# Patient Record
Sex: MALE | Race: NATIVE HAWAIIAN OR OTHER PACIFIC ISLANDER | HISPANIC OR LATINO | Employment: FULL TIME | URBAN - NONMETROPOLITAN AREA
[De-identification: names, ages, dates, MRNs, and addresses within clinical notes are randomized per-mention and may not be internally consistent; named-entity substitution may affect disease eponyms.]

---

## 2023-10-08 ENCOUNTER — HOSPITAL ENCOUNTER (EMERGENCY)
Facility: HOSPITAL | Age: 53
Discharge: HOME | End: 2023-10-08
Attending: EMERGENCY MEDICINE
Payer: COMMERCIAL

## 2023-10-08 ENCOUNTER — APPOINTMENT (EMERGENCY)
Dept: RADIOLOGY | Facility: HOSPITAL | Age: 53
End: 2023-10-08
Payer: COMMERCIAL

## 2023-10-08 VITALS
HEART RATE: 64 BPM | OXYGEN SATURATION: 98 % | DIASTOLIC BLOOD PRESSURE: 97 MMHG | TEMPERATURE: 97.7 F | SYSTOLIC BLOOD PRESSURE: 173 MMHG | RESPIRATION RATE: 18 BRPM

## 2023-10-08 DIAGNOSIS — M25.551 RIGHT HIP PAIN: Primary | ICD-10-CM

## 2023-10-08 PROCEDURE — 96372 THER/PROPH/DIAG INJ SC/IM: CPT

## 2023-10-08 PROCEDURE — 99283 EMERGENCY DEPT VISIT LOW MDM: CPT

## 2023-10-08 PROCEDURE — 73502 X-RAY EXAM HIP UNI 2-3 VIEWS: CPT

## 2023-10-08 PROCEDURE — 99284 EMERGENCY DEPT VISIT MOD MDM: CPT | Performed by: EMERGENCY MEDICINE

## 2023-10-08 RX ORDER — MORPHINE SULFATE 15 MG/1
7.5-15 TABLET ORAL EVERY 8 HOURS PRN
Qty: 4 TABLET | Refills: 0 | Status: SHIPPED | OUTPATIENT
Start: 2023-10-08

## 2023-10-08 RX ORDER — PREDNISONE 10 MG/1
50 TABLET ORAL DAILY
Qty: 25 TABLET | Refills: 0 | Status: SHIPPED | OUTPATIENT
Start: 2023-10-08 | End: 2023-10-13

## 2023-10-08 RX ORDER — KETOROLAC TROMETHAMINE 30 MG/ML
30 INJECTION, SOLUTION INTRAMUSCULAR; INTRAVENOUS ONCE
Status: COMPLETED | OUTPATIENT
Start: 2023-10-08 | End: 2023-10-08

## 2023-10-08 RX ORDER — MORPHINE SULFATE 15 MG/1
15 TABLET ORAL ONCE
Status: COMPLETED | OUTPATIENT
Start: 2023-10-08 | End: 2023-10-08

## 2023-10-08 RX ADMIN — MORPHINE SULFATE 15 MG: 15 TABLET ORAL at 16:21

## 2023-10-08 RX ADMIN — KETOROLAC TROMETHAMINE 30 MG: 30 INJECTION, SOLUTION INTRAMUSCULAR; INTRAVENOUS at 16:22

## 2023-10-08 NOTE — DISCHARGE INSTRUCTIONS
Possible bursitis, sacroiliitis  Return immediately if worse or any new symptoms  Tylenol 1000 mg every 6 hours as needed  and/or  Advil 400 mg every 6 hours as needed  May take both together

## 2023-10-08 NOTE — Clinical Note
Mare Leon was seen and treated in our emergency department on 10/8/2023. Diagnosis:     Pete Huddleston  may return to work on return date. He may return on this date: 10/16/2023         If you have any questions or concerns, please don't hesitate to call.       Ashtyn Denise, DO    ______________________________           _______________          _______________  Hospital Representative                              Date                                Time

## 2023-10-08 NOTE — ED PROVIDER NOTES
History  Chief Complaint   Patient presents with   • Hip Pain     Pt states right hip pain started last week. Denies trauma. States he is a  for work. 77-year-old male accompanied by spouse describes atraumatic right hip pain worse over the past week and a half, worse with movement, no analgesics used. No fever or chills. No history of pain or injury. No other complaints. History provided by:  Patient and spouse  Hip Pain  Location:  Right lateral hip area  Quality:  Ache, sharp  Severity:  Severe  Onset quality:  Gradual  Timing:  Constant  Progression:  Worsening  Chronicity:  New  Context:  Atraumatic  Relieved by:  Nothing tried  Worsened by: Movement, positioning  Ineffective treatments:  Not  Associated symptoms: no abdominal pain, no chest pain, no fever, no myalgias, no nausea, no rash, no shortness of breath and no vomiting        None       Past Medical History:   Diagnosis Date   • Arthritis    • Heart attack ([address])    • Hypertension    • Osteoporosis    • Prostate cancer [facility])    • Psychiatric disorder        Past Surgical History:   Procedure Laterality Date   • CARDIAC SURGERY     • PROSTATE SURGERY         History reviewed. No pertinent family history. I have reviewed and agree with the history as documented. E-Cigarette/Vaping   • E-Cigarette Use Never User      E-Cigarette/Vaping Substances     Social History     Tobacco Use   • Smoking status: Every Day     Types: Cigarettes   • Smokeless tobacco: Never   Vaping Use   • Vaping Use: Never used   Substance Use Topics   • Alcohol use: Yes   • Drug use: Never       Review of Systems   Constitutional: Negative for fever. Respiratory: Negative for shortness of breath. Cardiovascular: Negative for chest pain. Gastrointestinal: Negative for abdominal pain, nausea and vomiting. Musculoskeletal: Negative for myalgias. Skin: Negative for rash. All other systems reviewed and are negative.       Physical Exam  Physical Exam  Vitals and nursing note reviewed. Constitutional:       Comments: Pleasant, comfortable-appearing   HENT:      Head: Normocephalic and atraumatic. Mouth/Throat:      Mouth: Mucous membranes are moist.      Pharynx: Oropharynx is clear. Eyes:      Conjunctiva/sclera: Conjunctivae normal.      Pupils: Pupils are equal, round, and reactive to light. Cardiovascular:      Rate and Rhythm: Normal rate and regular rhythm. Heart sounds: Normal heart sounds. Pulmonary:      Effort: Pulmonary effort is normal.      Breath sounds: Normal breath sounds. Abdominal:      General: Bowel sounds are normal. There is no distension. Palpations: Abdomen is soft. Tenderness: There is no abdominal tenderness. Musculoskeletal:         General: No deformity. Cervical back: Neck supple. Comments: Right hip tender laterally greater trochanter, normal femoral arterial pulses, no tenderness, right posterior hip tenderness, sacroiliac area tenderness, no overlying skin changes or swelling, no midline spinal tenderness or deformity, intact range of motion at right hip with discomfort, neurovascularly intact right lower extremity with dorsal pedal pulses 2+, grossly intact sensation, intact great toe flexion and extension bilaterally, saddle area sensation intact   Skin:     General: Skin is warm and dry. Neurological:      General: No focal deficit present. Mental Status: He is alert and oriented to person, place, and time. Cranial Nerves: No cranial nerve deficit. Coordination: Coordination normal.   Psychiatric:         Behavior: Behavior normal.         Thought Content:  Thought content normal.         Judgment: Judgment normal.         Vital Signs  ED Triage Vitals   Temperature Pulse Respirations Blood Pressure SpO2   10/08/23 1425 10/08/23 1425 10/08/23 1425 10/08/23 1425 10/08/23 1425   97.7 °F (36.5 °C) 86 20 (!) 166/103 100 %      Temp Source Heart Rate Source Patient Position - Orthostatic VS BP Location FiO2 (%)   10/08/23 1425 -- -- -- --   Temporal          Pain Score       10/08/23 1426       10 - Worst Possible Pain           Vitals:    10/08/23 1425   BP: (!) 166/103   Pulse: 86         Visual Acuity      ED Medications  Medications   morphine (MSIR) IR tablet 15 mg (15 mg Oral Given 10/8/23 1621)   ketorolac (TORADOL) injection 30 mg (30 mg Intramuscular Given 10/8/23 1622)       Diagnostic Studies  Results Reviewed     None                 XR hip/pelv 2-3 vws right if performed   ED Interpretation by Tamika Rowan DO (10/08 1659)   No obvious fracture or destructive changes                 Procedures  Procedures         ED Course  ED Course as of 10/08/23 1702   [place] Oct 08, 2023   1701 We discussed x-rays, radiology follow-up, care going forward and voices good understanding as spouse recently going through similar episode, agrees with close outpatient follow-up and will return immediately if worse or any new symptoms                                             Medical Decision Making  Right hip pain: acute illness or injury  Amount and/or Complexity of Data Reviewed  Radiology: ordered and independent interpretation performed. Decision-making details documented in ED Course. ECG/medicine tests: ordered and independent interpretation performed. Decision-making details documented in ED Course. Risk  Prescription drug management. Disposition  Final diagnoses:   Right hip pain     Time reflects when diagnosis was documented in both MDM as applicable and the Disposition within this note     Time User Action Codes Description Comment    10/8/2023  4:59 PM Tamika Rowan Add [J64.442] Right hip pain       ED Disposition     ED Disposition   Discharge    Condition   Stable    Date/Time   Sun Oct 8, 2023  4:59 PM    Comment   Mari. discharge to home/self care.                Follow-up Information     Follow up With Specialties Details Why Contact appbackr II  Schedule an appointment as soon as possible for a visit in 1 week  [address] [facility]  [address]  [phone]            Patient's Medications   Discharge Prescriptions    MORPHINE (MSIR) 15 MG TABLET    Take 0.5-1 tablets (7.5-15 mg total) by mouth every 8 (eight) hours as needed for severe pain for up to 4 doses Max Daily Amount: 45 mg       Start Date: 10/8/2023 End Date: --       Order Dose: 7.5-15 mg       Quantity: 4 tablet    Refills: 0    PREDNISONE 10 MG TABLET    Take 5 tablets (50 mg total) by mouth daily for 5 days       Start Date: 10/8/2023 End Date: 10/13/2023       Order Dose: 50 mg       Quantity: 25 tablet    Refills: 0       No discharge procedures on file.     PDMP Review     None          ED Provider  Electronically Signed by           Anand Diaz DO  10/08/23 7184

## 2023-12-17 ENCOUNTER — HOSPITAL ENCOUNTER (EMERGENCY)
Facility: HOSPITAL | Age: 53
Discharge: HOME | End: 2023-12-17
Attending: EMERGENCY MEDICINE

## 2023-12-17 ENCOUNTER — APPOINTMENT (EMERGENCY)
Dept: RADIOLOGY | Facility: HOSPITAL | Age: 53
End: 2023-12-17

## 2023-12-17 VITALS
HEART RATE: 77 BPM | RESPIRATION RATE: 20 BRPM | WEIGHT: 197.53 LBS | BODY MASS INDEX: 31 KG/M2 | TEMPERATURE: 97.9 F | DIASTOLIC BLOOD PRESSURE: 91 MMHG | HEIGHT: 67 IN | SYSTOLIC BLOOD PRESSURE: 172 MMHG | OXYGEN SATURATION: 94 %

## 2023-12-17 DIAGNOSIS — E78.00 HIGH CHOLESTEROL: ICD-10-CM

## 2023-12-17 DIAGNOSIS — G62.9 NEUROPATHY: ICD-10-CM

## 2023-12-17 DIAGNOSIS — K21.9 GERD (GASTROESOPHAGEAL REFLUX DISEASE): ICD-10-CM

## 2023-12-17 DIAGNOSIS — F32.A DEPRESSION: ICD-10-CM

## 2023-12-17 DIAGNOSIS — I10 HYPERTENSION: Primary | ICD-10-CM

## 2023-12-17 LAB
ALBUMIN SERPL BCP-MCNC: 4.3 G/DL (ref 3.5–5)
ALP SERPL-CCNC: 86 U/L (ref 34–104)
ALT SERPL W P-5'-P-CCNC: 13 U/L (ref 7–52)
ANION GAP SERPL CALCULATED.3IONS-SCNC: 8 MMOL/L
AST SERPL W P-5'-P-CCNC: 14 U/L (ref 13–39)
BASOPHILS # BLD AUTO: 0.05 THOUSANDS/ÂΜL (ref 0–0.1)
BASOPHILS NFR BLD AUTO: 1 % (ref 0–1)
BILIRUB SERPL-MCNC: 0.37 MG/DL (ref 0.2–1)
BNP SERPL-MCNC: 22 PG/ML (ref 0–100)
BUN SERPL-MCNC: 13 MG/DL (ref 5–25)
CALCIUM SERPL-MCNC: 9.2 MG/DL (ref 8.4–10.2)
CARDIAC TROPONIN I PNL SERPL HS: 4 NG/L
CHLORIDE SERPL-SCNC: 108 MMOL/L (ref 96–108)
CO2 SERPL-SCNC: 23 MMOL/L (ref 21–32)
CREAT SERPL-MCNC: 0.84 MG/DL (ref 0.6–1.3)
EOSINOPHIL # BLD AUTO: 0.11 THOUSAND/ÂΜL (ref 0–0.61)
EOSINOPHIL NFR BLD AUTO: 2 % (ref 0–6)
ERYTHROCYTE [DISTWIDTH] IN BLOOD BY AUTOMATED COUNT: 13.1 % (ref 11.6–15.1)
FLUAV RNA RESP QL NAA+PROBE: NEGATIVE
FLUBV RNA RESP QL NAA+PROBE: NEGATIVE
GFR SERPL CREATININE-BSD FRML MDRD: 99 ML/MIN/1.73SQ M
GLUCOSE SERPL-MCNC: 113 MG/DL (ref 65–140)
HCT VFR BLD AUTO: 41.8 % (ref 36.5–49.3)
HGB BLD-MCNC: 13.9 G/DL (ref 12–17)
IMM GRANULOCYTES # BLD AUTO: 0.01 THOUSAND/UL (ref 0–0.2)
IMM GRANULOCYTES NFR BLD AUTO: 0 % (ref 0–2)
LYMPHOCYTES # BLD AUTO: 2.97 THOUSANDS/ÂΜL (ref 0.6–4.47)
LYMPHOCYTES NFR BLD AUTO: 60 % (ref 14–44)
MCH RBC QN AUTO: 29.8 PG (ref 26.8–34.3)
MCHC RBC AUTO-ENTMCNC: 33.3 G/DL (ref 31.4–37.4)
MCV RBC AUTO: 90 FL (ref 82–98)
MONOCYTES # BLD AUTO: 0.38 THOUSAND/ÂΜL (ref 0.17–1.22)
MONOCYTES NFR BLD AUTO: 8 % (ref 4–12)
NEUTROPHILS # BLD AUTO: 1.45 THOUSANDS/ÂΜL (ref 1.85–7.62)
NEUTS SEG NFR BLD AUTO: 29 % (ref 43–75)
NRBC BLD AUTO-RTO: 0 /100 WBCS
PLATELET # BLD AUTO: 205 THOUSANDS/UL (ref 149–390)
PMV BLD AUTO: 10.5 FL (ref 8.9–12.7)
POTASSIUM SERPL-SCNC: 3.2 MMOL/L (ref 3.5–5.3)
PROT SERPL-MCNC: 7.1 G/DL (ref 6.4–8.4)
RBC # BLD AUTO: 4.67 MILLION/UL (ref 3.88–5.62)
RSV RNA RESP QL NAA+PROBE: NEGATIVE
SARS-COV-2 RNA RESP QL NAA+PROBE: NEGATIVE
SODIUM SERPL-SCNC: 139 MMOL/L (ref 135–147)
WBC # BLD AUTO: 4.97 THOUSAND/UL (ref 4.31–10.16)

## 2023-12-17 PROCEDURE — 36415 COLL VENOUS BLD VENIPUNCTURE: CPT | Performed by: EMERGENCY MEDICINE

## 2023-12-17 PROCEDURE — 83880 ASSAY OF NATRIURETIC PEPTIDE: CPT | Performed by: EMERGENCY MEDICINE

## 2023-12-17 PROCEDURE — 80053 COMPREHEN METABOLIC PANEL: CPT | Performed by: EMERGENCY MEDICINE

## 2023-12-17 PROCEDURE — 99285 EMERGENCY DEPT VISIT HI MDM: CPT

## 2023-12-17 PROCEDURE — 99285 EMERGENCY DEPT VISIT HI MDM: CPT | Performed by: EMERGENCY MEDICINE

## 2023-12-17 PROCEDURE — 71045 X-RAY EXAM CHEST 1 VIEW: CPT

## 2023-12-17 PROCEDURE — 93005 ELECTROCARDIOGRAM TRACING: CPT

## 2023-12-17 PROCEDURE — 0241U HB NFCT DS VIR RESP RNA 4 TRGT: CPT | Performed by: EMERGENCY MEDICINE

## 2023-12-17 PROCEDURE — 85025 COMPLETE CBC W/AUTO DIFF WBC: CPT | Performed by: EMERGENCY MEDICINE

## 2023-12-17 PROCEDURE — 94640 AIRWAY INHALATION TREATMENT: CPT

## 2023-12-17 PROCEDURE — 84484 ASSAY OF TROPONIN QUANT: CPT | Performed by: EMERGENCY MEDICINE

## 2023-12-17 RX ORDER — METOPROLOL SUCCINATE 25 MG/1
25 TABLET, EXTENDED RELEASE ORAL DAILY
Qty: 30 TABLET | Refills: 2 | Status: SHIPPED | OUTPATIENT
Start: 2023-12-17 | End: 2024-03-16

## 2023-12-17 RX ORDER — ALBUTEROL SULFATE 90 UG/1
2 AEROSOL, METERED RESPIRATORY (INHALATION) ONCE
Status: COMPLETED | OUTPATIENT
Start: 2023-12-17 | End: 2023-12-17

## 2023-12-17 RX ORDER — DULOXETIN HYDROCHLORIDE 30 MG/1
30 CAPSULE, DELAYED RELEASE ORAL DAILY
Qty: 30 CAPSULE | Refills: 2 | Status: SHIPPED | OUTPATIENT
Start: 2023-12-17 | End: 2024-03-16

## 2023-12-17 RX ORDER — OMEPRAZOLE 20 MG/1
20 CAPSULE, DELAYED RELEASE ORAL DAILY
Qty: 30 CAPSULE | Refills: 2 | Status: SHIPPED | OUTPATIENT
Start: 2023-12-17

## 2023-12-17 RX ORDER — GABAPENTIN 300 MG/1
600 CAPSULE ORAL 3 TIMES DAILY
Qty: 180 CAPSULE | Refills: 2 | Status: SHIPPED | OUTPATIENT
Start: 2023-12-17 | End: 2024-03-16

## 2023-12-17 RX ORDER — POTASSIUM CHLORIDE 20 MEQ/1
40 TABLET, EXTENDED RELEASE ORAL ONCE
Status: COMPLETED | OUTPATIENT
Start: 2023-12-17 | End: 2023-12-17

## 2023-12-17 RX ORDER — ATORVASTATIN CALCIUM 80 MG/1
80 TABLET, FILM COATED ORAL DAILY
Qty: 30 TABLET | Refills: 2 | Status: SHIPPED | OUTPATIENT
Start: 2023-12-17 | End: 2024-03-16

## 2023-12-17 RX ORDER — IPRATROPIUM BROMIDE AND ALBUTEROL SULFATE 2.5; .5 MG/3ML; MG/3ML
3 SOLUTION RESPIRATORY (INHALATION) ONCE
Status: COMPLETED | OUTPATIENT
Start: 2023-12-17 | End: 2023-12-17

## 2023-12-17 RX ADMIN — ALBUTEROL SULFATE 2 PUFF: 90 AEROSOL, METERED RESPIRATORY (INHALATION) at 22:28

## 2023-12-17 RX ADMIN — IPRATROPIUM BROMIDE AND ALBUTEROL SULFATE 3 ML: 2.5; .5 SOLUTION RESPIRATORY (INHALATION) at 21:06

## 2023-12-17 RX ADMIN — POTASSIUM CHLORIDE 40 MEQ: 1500 TABLET, EXTENDED RELEASE ORAL at 22:26

## 2023-12-18 LAB
ATRIAL RATE: 78 BPM
P AXIS: 41 DEGREES
PR INTERVAL: 146 MS
QRS AXIS: -33 DEGREES
QRSD INTERVAL: 80 MS
QT INTERVAL: 378 MS
QTC INTERVAL: 430 MS
T WAVE AXIS: 43 DEGREES
VENTRICULAR RATE: 78 BPM

## 2023-12-18 NOTE — ED PROVIDER NOTES
History  Chief Complaint   Patient presents with    Shortness of Breath     Pt reports sob since Friday, also complains of headache for one week     Patient complains of 3 days of shortness of breath, cough, headache, backache, fatigue  Denies chest pain or abdominal pain  Denies fevers      History provided by:  Patient   used: No    Shortness of Breath  Severity:  Moderate  Onset quality:  Gradual  Duration:  3 days  Timing:  Constant  Progression:  Unchanged  Chronicity:  New  Relieved by:  Nothing  Worsened by:  Nothing  Ineffective treatments:  None tried  Associated symptoms: cough and headaches    Associated symptoms: no abdominal pain, no chest pain, no claudication, no ear pain, no fever, no hemoptysis, no neck pain, no rash, no sore throat, no syncope, no vomiting and no wheezing        Prior to Admission Medications   Prescriptions Last Dose Informant Patient Reported? Taking?   morphine (MSIR) 15 mg tablet   No No   Sig: Take 0.5-1 tablets (7.5-15 mg total) by mouth every 8 (eight) hours as needed for severe pain for up to 4 doses Max Daily Amount: 45 mg      Facility-Administered Medications: None       Past Medical History:   Diagnosis Date    Arthritis     Heart attack (HCC)     Hypertension     Osteoporosis     Prostate cancer (HCC)     Psychiatric disorder        Past Surgical History:   Procedure Laterality Date    CARDIAC SURGERY      PROSTATE SURGERY         History reviewed. No pertinent family history.  I have reviewed and agree with the history as documented.    E-Cigarette/Vaping    E-Cigarette Use Never User      E-Cigarette/Vaping Substances     Social History     Tobacco Use    Smoking status: Every Day     Current packs/day: 0.50     Types: Cigarettes    Smokeless tobacco: Never   Vaping Use    Vaping status: Never Used   Substance Use Topics    Alcohol use: Not Currently    Drug use: Never       Review of Systems   Constitutional:  Negative for chills and fever.    HENT:  Negative for ear pain, hearing loss, sore throat, trouble swallowing and voice change.    Eyes:  Negative for pain and discharge.   Respiratory:  Positive for cough and shortness of breath. Negative for hemoptysis and wheezing.    Cardiovascular:  Negative for chest pain, palpitations, claudication and syncope.   Gastrointestinal:  Negative for abdominal pain, blood in stool, constipation, diarrhea, nausea and vomiting.   Genitourinary:  Negative for dysuria, flank pain, frequency and hematuria.   Musculoskeletal:  Negative for joint swelling, neck pain and neck stiffness.   Skin:  Negative for rash and wound.   Neurological:  Positive for headaches. Negative for dizziness, seizures, syncope and facial asymmetry.   Psychiatric/Behavioral:  Negative for hallucinations, self-injury and suicidal ideas.    All other systems reviewed and are negative.      Physical Exam  Physical Exam  Vitals and nursing note reviewed.   Constitutional:       General: He is not in acute distress.     Appearance: He is well-developed.   HENT:      Head: Normocephalic and atraumatic.      Right Ear: External ear normal.      Left Ear: External ear normal.   Eyes:      General: No scleral icterus.        Right eye: No discharge.         Left eye: No discharge.      Extraocular Movements: Extraocular movements intact.      Conjunctiva/sclera: Conjunctivae normal.   Cardiovascular:      Rate and Rhythm: Normal rate and regular rhythm.      Heart sounds: Normal heart sounds. No murmur heard.  Pulmonary:      Effort: Pulmonary effort is normal.      Breath sounds: Normal breath sounds. No decreased breath sounds, wheezing, rhonchi or rales.   Abdominal:      General: Bowel sounds are normal. There is no distension.      Palpations: Abdomen is soft.      Tenderness: There is no abdominal tenderness. There is no guarding or rebound.   Musculoskeletal:         General: No deformity. Normal range of motion.      Cervical back: Normal range  of motion and neck supple.   Skin:     General: Skin is warm and dry.      Findings: No rash.   Neurological:      General: No focal deficit present.      Mental Status: He is alert and oriented to person, place, and time.      Cranial Nerves: No cranial nerve deficit.   Psychiatric:         Mood and Affect: Mood normal.         Behavior: Behavior normal.         Thought Content: Thought content normal.         Judgment: Judgment normal.         Vital Signs  ED Triage Vitals [12/17/23 2041]   Temperature Pulse Respirations Blood Pressure SpO2   97.9 °F (36.6 °C) 83 18 (!) 183/101 98 %      Temp Source Heart Rate Source Patient Position - Orthostatic VS BP Location FiO2 (%)   Temporal Monitor Sitting Right arm --      Pain Score       10 - Worst Possible Pain           Vitals:    12/17/23 2041 12/17/23 2045   BP: (!) 183/101 (!) 172/92   Pulse: 83 80   Patient Position - Orthostatic VS: Sitting          Visual Acuity      ED Medications  Medications   ipratropium-albuterol (DUO-NEB) 0.5-2.5 mg/3 mL inhalation solution 3 mL (3 mL Nebulization Given 12/17/23 2106)       Diagnostic Studies  Results Reviewed       Procedure Component Value Units Date/Time    CBC and differential [[id number]] Collected: 12/17/23 2116    Lab Status: In process Specimen: Blood from Arm, Left Updated: 12/17/23 2121    COVID19, Influenza A/B, RSV PCR, [facility] [[id number]] Collected: 12/17/23 2116    Lab Status: In process Specimen: Nares from Nose Updated: 12/17/23 2121    HS Troponin 0hr (reflex protocol) [[id number]] Collected: 12/17/23 2116    Lab Status: In process Specimen: Blood from Arm, Left Updated: 12/17/23 2120    Comprehensive metabolic panel [[id number]] Collected: 12/17/23 2116    Lab Status: In process Specimen: Blood from Arm, Left Updated: 12/17/23 2120    B-Type Natriuretic Peptide(BNP) [[id number]] Collected: 12/17/23 2116    Lab Status: In process Specimen: Blood from Arm, Left Updated: 12/17/23 2120                   XR chest  portable    (Results Pending)              Procedures  ECG 12 Lead Documentation Only    Date/Time: 12/17/2023 8:44 PM    Performed by: Hany Vazquez MD  Authorized by: Hany Vazquez MD    ECG reviewed by me, the ED Provider: yes    Patient location:  ED  Previous ECG:     Previous ECG:  Unavailable  Interpretation:     Interpretation: normal    Rate:     ECG rate:  78    ECG rate assessment: normal    Rhythm:     Rhythm: sinus rhythm    Ectopy:     Ectopy: none    QRS:     QRS axis:  Left    QRS intervals:  Normal  Conduction:     Conduction: normal    ST segments:     ST segments:  Normal  T waves:     T waves: normal    Other findings:     Other findings: LVH             ED Course  ED Course as of 12/17/23 2122   Sun Dec 17, 2023   2119 Signed out to Dr. Khan pending lab work, chest x-ray, COVID testing                               SBIRT 22yo+      Flowsheet Row Most Recent Value   Initial Alcohol Screen: US AUDIT-C     1. How often do you have a drink containing alcohol? 0 Filed at: 12/17/2023 2041   2. How many drinks containing alcohol do you have on a typical day you are drinking?  0 Filed at: 12/17/2023 2041   3a. Male UNDER 65: How often do you have five or more drinks on one occasion? 0 Filed at: 12/17/2023 2041   Audit-C Score 0 Filed at: 12/17/2023 2041   FRANCHESCA: How many times in the past year have you...    Used an illegal drug or used a prescription medication for non-medical reasons? Never Filed at: 12/17/2023 2041                      Medical Decision Making  Based on the history and medical screening exam performed the diagnostic considerations include but are not limited to COVID/flu/RSV/other viral respiratory illness, CHF, COPD.    Signed out to Dr. Khan pending lab work, chest x-ray, COVID testing      Amount and/or Complexity of Data Reviewed  Labs: ordered. Decision-making details documented in ED Course.     Details: Pending at time of signout  Radiology: ordered and  independent interpretation performed. Decision-making details documented in ED Course.     Details: Pending at time of signout  ECG/medicine tests: ordered and independent interpretation performed. Decision-making details documented in ED Course.     Details: Normal sinus rhythm rate 78 with left axis and LVH    Risk  Prescription drug management.             Disposition  Final diagnoses:   None     ED Disposition       None          Follow-up Information    None         Patient's Medications   Discharge Prescriptions    No medications on file       No discharge procedures on file.    PDMP Review       None            ED Provider  Electronically Signed by             Hany Vazquez MD  12/17/23 0451

## 2023-12-18 NOTE — ED CARE HANDOFF
Emergency Department Sign Out Note        Sign out and transfer of care from Dr. Vazquez. See Separate Emergency Department note.     The patient, Christopher Smalls Jr., was evaluated by the previous provider for shortness of breath.    Workup Completed:  Lab and imaging    ED Course / Workup Pending (followup):  Results labs and imaging, reevaluation and disposition            Patient resting comfortably with stable vital signs, lab and imaging findings unremarkable and discussed at bedside, patient and wife inquired as to whether or not symptoms could be related to the fact that he ran out of his medications a few weeks ago, he has yet to establish medical care in this region as he recently moved here and will not have insurance until January 1, I did provide patient with prescriptions for multiple medications that he is chronically on that he ran out of 2 to 3 weeks ago, advise close follow-up with PCP which he states he will establish once he obtains his insurance on January 1, advised to return if any additional concerns                         Procedures  Medical Decision Making  Amount and/or Complexity of Data Reviewed  Labs: ordered.  Radiology: ordered and independent interpretation performed.    Risk  Prescription drug management.            Disposition  Final diagnoses:   Hypertension   High cholesterol   Depression   GERD (gastroesophageal reflux disease)   Neuropathy     Time reflects when diagnosis was documented in both MDM as applicable and the Disposition within this note       Time User Action Codes Description Comment    12/17/2023 10:20 PM Denise Khan Add [I10] Hypertension     12/17/2023 10:20 PM Denise Khan Add [E78.00] High cholesterol     12/17/2023 10:20 PM Denise Khan Add [F32.A] Depression     12/17/2023 10:20 PM Denise Khan Add [K21.9] GERD (gastroesophageal reflux disease)     12/17/2023 10:21 PM Denise Khan Add [G62.9] Neuropathy           ED Disposition       ED  Disposition   Discharge    Condition   Stable    Date/Time   Sun Dec 17, 2023 10:22 PM    Comment   Christopher Smalls Jr. discharge to home/self care.                   Follow-up Information       Follow up With Specialties Details Why Contact Info    Edwina Davies DO Family Medicine In 1 week  [address] [facility] [address] [phone]            Discharge Medication List as of 12/17/2023 10:22 PM        START taking these medications    Details   atorvastatin (LIPITOR) 80 mg tablet Take 1 tablet (80 mg total) by mouth daily, Starting Sun 12/17/2023, Until Sat 3/16/2024, Normal      DULoxetine (Cymbalta) 30 mg delayed release capsule Take 1 capsule (30 mg total) by mouth daily, Starting Sun 12/17/2023, Until Sat 3/16/2024, Normal      gabapentin (Neurontin) 300 mg capsule Take 2 capsules (600 mg total) by mouth 3 (three) times a day For post-herpetic neuralgia: Take 1 tablet on day 1,  Then take 2 tablets on day 2, Then take 3 tablets on day 3 and every day after that as instructed by your doctor., Starting Sun 12/17/2023 , Until Sat 3/16/2024, Normal      metoprolol succinate (TOPROL-XL) 25 mg 24 hr tablet Take 1 tablet (25 mg total) by mouth daily, Starting Sun 12/17/2023, Until Sat 3/16/2024, Normal      omeprazole (PriLOSEC) 20 mg delayed release capsule Take 1 capsule (20 mg total) by mouth daily, Starting Sun 12/17/2023, Normal           CONTINUE these medications which have NOT CHANGED    Details   morphine (MSIR) 15 mg tablet Take 0.5-1 tablets (7.5-15 mg total) by mouth every 8 (eight) hours as needed for severe pain for up to 4 doses Max Daily Amount: 45 mg, Starting Sun 10/8/2023, Normal           No discharge procedures on file.       ED Provider  Electronically Signed by     Denise Khan DO  12/18/23 0049

## 2024-01-11 ENCOUNTER — HOSPITAL ENCOUNTER (EMERGENCY)
Facility: HOSPITAL | Age: 54
Discharge: HOME | End: 2024-01-11
Attending: EMERGENCY MEDICINE
Payer: COMMERCIAL

## 2024-01-11 ENCOUNTER — APPOINTMENT (EMERGENCY)
Dept: CT IMAGING | Facility: HOSPITAL | Age: 54
End: 2024-01-11
Payer: COMMERCIAL

## 2024-01-11 VITALS
WEIGHT: 190 LBS | TEMPERATURE: 98.5 F | RESPIRATION RATE: 16 BRPM | BODY MASS INDEX: 29.76 KG/M2 | HEART RATE: 89 BPM | DIASTOLIC BLOOD PRESSURE: 83 MMHG | SYSTOLIC BLOOD PRESSURE: 151 MMHG | OXYGEN SATURATION: 97 %

## 2024-01-11 DIAGNOSIS — G43.909 MIGRAINE: Primary | ICD-10-CM

## 2024-01-11 DIAGNOSIS — I10 HYPERTENSION: ICD-10-CM

## 2024-01-11 LAB
ALBUMIN SERPL BCP-MCNC: 4.5 G/DL (ref 3.5–5)
ALP SERPL-CCNC: 88 U/L (ref 34–104)
ALT SERPL W P-5'-P-CCNC: 16 U/L (ref 7–52)
ANION GAP SERPL CALCULATED.3IONS-SCNC: 10 MMOL/L
AST SERPL W P-5'-P-CCNC: 29 U/L (ref 13–39)
BASOPHILS # BLD AUTO: 0.06 THOUSANDS/ÂΜL (ref 0–0.1)
BASOPHILS NFR BLD AUTO: 1 % (ref 0–1)
BILIRUB SERPL-MCNC: 0.59 MG/DL (ref 0.2–1)
BUN SERPL-MCNC: 6 MG/DL (ref 5–25)
CALCIUM SERPL-MCNC: 9.4 MG/DL (ref 8.4–10.2)
CHLORIDE SERPL-SCNC: 103 MMOL/L (ref 96–108)
CO2 SERPL-SCNC: 23 MMOL/L (ref 21–32)
CREAT SERPL-MCNC: 0.9 MG/DL (ref 0.6–1.3)
EOSINOPHIL # BLD AUTO: 0.05 THOUSAND/ÂΜL (ref 0–0.61)
EOSINOPHIL NFR BLD AUTO: 1 % (ref 0–6)
ERYTHROCYTE [DISTWIDTH] IN BLOOD BY AUTOMATED COUNT: 12.6 % (ref 11.6–15.1)
ERYTHROCYTE [SEDIMENTATION RATE] IN BLOOD: 25 MM/HOUR (ref 0–19)
GFR SERPL CREATININE-BSD FRML MDRD: 97 ML/MIN/1.73SQ M
GLUCOSE SERPL-MCNC: 111 MG/DL (ref 65–140)
HCT VFR BLD AUTO: 42.6 % (ref 36.5–49.3)
HGB BLD-MCNC: 14.3 G/DL (ref 12–17)
IMM GRANULOCYTES # BLD AUTO: 0.01 THOUSAND/UL (ref 0–0.2)
IMM GRANULOCYTES NFR BLD AUTO: 0 % (ref 0–2)
LYMPHOCYTES # BLD AUTO: 2.6 THOUSANDS/ÂΜL (ref 0.6–4.47)
LYMPHOCYTES NFR BLD AUTO: 53 % (ref 14–44)
MCH RBC QN AUTO: 29.5 PG (ref 26.8–34.3)
MCHC RBC AUTO-ENTMCNC: 33.6 G/DL (ref 31.4–37.4)
MCV RBC AUTO: 88 FL (ref 82–98)
MONOCYTES # BLD AUTO: 0.31 THOUSAND/ÂΜL (ref 0.17–1.22)
MONOCYTES NFR BLD AUTO: 6 % (ref 4–12)
NEUTROPHILS # BLD AUTO: 1.95 THOUSANDS/ÂΜL (ref 1.85–7.62)
NEUTS SEG NFR BLD AUTO: 39 % (ref 43–75)
NRBC BLD AUTO-RTO: 0 /100 WBCS
PLATELET # BLD AUTO: 257 THOUSANDS/UL (ref 149–390)
PMV BLD AUTO: 11.3 FL (ref 8.9–12.7)
POTASSIUM SERPL-SCNC: 4.6 MMOL/L (ref 3.5–5.3)
PROT SERPL-MCNC: 7.8 G/DL (ref 6.4–8.4)
RBC # BLD AUTO: 4.85 MILLION/UL (ref 3.88–5.62)
SODIUM SERPL-SCNC: 136 MMOL/L (ref 135–147)
WBC # BLD AUTO: 4.98 THOUSAND/UL (ref 4.31–10.16)

## 2024-01-11 PROCEDURE — 85025 COMPLETE CBC W/AUTO DIFF WBC: CPT | Performed by: EMERGENCY MEDICINE

## 2024-01-11 PROCEDURE — 99284 EMERGENCY DEPT VISIT MOD MDM: CPT | Performed by: EMERGENCY MEDICINE

## 2024-01-11 PROCEDURE — 80053 COMPREHEN METABOLIC PANEL: CPT | Performed by: EMERGENCY MEDICINE

## 2024-01-11 PROCEDURE — G1004 CDSM NDSC: HCPCS

## 2024-01-11 PROCEDURE — 36415 COLL VENOUS BLD VENIPUNCTURE: CPT | Performed by: EMERGENCY MEDICINE

## 2024-01-11 PROCEDURE — 85652 RBC SED RATE AUTOMATED: CPT | Performed by: EMERGENCY MEDICINE

## 2024-01-11 PROCEDURE — 70450 CT HEAD/BRAIN W/O DYE: CPT

## 2024-01-11 RX ORDER — KETOROLAC TROMETHAMINE 30 MG/ML
30 INJECTION, SOLUTION INTRAMUSCULAR; INTRAVENOUS ONCE
Status: COMPLETED | OUTPATIENT
Start: 2024-01-11 | End: 2024-01-11

## 2024-01-11 RX ORDER — SUMATRIPTAN 6 MG/.5ML
6 INJECTION, SOLUTION SUBCUTANEOUS ONCE
Status: COMPLETED | OUTPATIENT
Start: 2024-01-11 | End: 2024-01-11

## 2024-01-11 RX ORDER — SUMATRIPTAN 50 MG/1
50 TABLET, FILM COATED ORAL ONCE AS NEEDED
Qty: 4 TABLET | Refills: 0 | Status: SHIPPED | OUTPATIENT
Start: 2024-01-11 | End: 2024-01-17 | Stop reason: SDUPTHER

## 2024-01-11 RX ORDER — IBUPROFEN 800 MG/1
800 TABLET ORAL 3 TIMES DAILY
Qty: 21 TABLET | Refills: 0 | Status: SHIPPED | OUTPATIENT
Start: 2024-01-11

## 2024-01-11 RX ORDER — DIPHENHYDRAMINE HYDROCHLORIDE 50 MG/ML
25 INJECTION INTRAMUSCULAR; INTRAVENOUS ONCE
Status: COMPLETED | OUTPATIENT
Start: 2024-01-11 | End: 2024-01-11

## 2024-01-11 RX ORDER — METOPROLOL TARTRATE 1 MG/ML
5 INJECTION, SOLUTION INTRAVENOUS ONCE
Status: COMPLETED | OUTPATIENT
Start: 2024-01-11 | End: 2024-01-11

## 2024-01-11 RX ORDER — DEXAMETHASONE SODIUM PHOSPHATE 10 MG/ML
8 INJECTION, SOLUTION INTRAMUSCULAR; INTRAVENOUS ONCE
Status: COMPLETED | OUTPATIENT
Start: 2024-01-11 | End: 2024-01-11

## 2024-01-11 RX ORDER — METOCLOPRAMIDE HYDROCHLORIDE 5 MG/ML
10 INJECTION INTRAMUSCULAR; INTRAVENOUS ONCE
Status: COMPLETED | OUTPATIENT
Start: 2024-01-11 | End: 2024-01-11

## 2024-01-11 RX ADMIN — DEXAMETHASONE SODIUM PHOSPHATE 8 MG: 10 INJECTION, SOLUTION INTRAMUSCULAR; INTRAVENOUS at 19:55

## 2024-01-11 RX ADMIN — SUMATRIPTAN 6 MG: 6 INJECTION, SOLUTION SUBCUTANEOUS at 19:55

## 2024-01-11 RX ADMIN — METOCLOPRAMIDE HYDROCHLORIDE 10 MG: 5 INJECTION INTRAMUSCULAR; INTRAVENOUS at 19:55

## 2024-01-11 RX ADMIN — METOROPROLOL TARTRATE 5 MG: 5 INJECTION, SOLUTION INTRAVENOUS at 21:32

## 2024-01-11 RX ADMIN — KETOROLAC TROMETHAMINE 30 MG: 30 INJECTION, SOLUTION INTRAMUSCULAR; INTRAVENOUS at 21:31

## 2024-01-11 RX ADMIN — SODIUM CHLORIDE 1000 ML: 0.9 INJECTION, SOLUTION INTRAVENOUS at 19:53

## 2024-01-11 RX ADMIN — DIPHENHYDRAMINE HYDROCHLORIDE 25 MG: 50 INJECTION, SOLUTION INTRAMUSCULAR; INTRAVENOUS at 19:55

## 2024-01-11 NOTE — Clinical Note
Christopher Smalls was seen and treated in our emergency department on 1/11/2024.                Diagnosis:     Christopher  may return to work on return date.    He may return on this date: 01/13/2024         If you have any questions or concerns, please don't hesitate to call.      Ute Nunez, DO    ______________________________           _______________          _______________  Hospital Representative                              Date                                Time

## 2024-01-12 RX ORDER — EZETIMIBE 10 MG/1
10 TABLET ORAL
COMMUNITY
Start: 2023-07-05 | End: 2024-01-15 | Stop reason: SDUPTHER

## 2024-01-12 RX ORDER — ASPIRIN 81 MG/1
81 TABLET, CHEWABLE ORAL
COMMUNITY
Start: 2023-04-18 | End: 2024-01-15

## 2024-01-12 NOTE — ED PROVIDER NOTES
History  Chief Complaint   Patient presents with    Headache     Patient reports headache for the past week and pain in right shoulder.      53 yr old male presents for evaluation  of headache for several days. Patient states that  he has had a history of migraines but this is the worse headache of his life. He admits to photophobia and some slight blurry vision. Patient is also complaining of right shoulder pain which is chronic and always present from a previous rotator cuff tear. He denies re-injury.        Prior to Admission Medications   Prescriptions Last Dose Informant Patient Reported? Taking?   DULoxetine (Cymbalta) 30 mg delayed release capsule   No No   Sig: Take 1 capsule (30 mg total) by mouth daily   atorvastatin (LIPITOR) 80 mg tablet   No No   Sig: Take 1 tablet (80 mg total) by mouth daily   gabapentin (Neurontin) 300 mg capsule   No No   Sig: Take 2 capsules (600 mg total) by mouth 3 (three) times a day For post-herpetic neuralgia: Take 1 tablet on day 1,  Then take 2 tablets on day 2, Then take 3 tablets on day 3 and every day after that as instructed by your doctor.   metoprolol succinate (TOPROL-XL) 25 mg 24 hr tablet   No No   Sig: Take 1 tablet (25 mg total) by mouth daily   morphine (MSIR) 15 mg tablet   No No   Sig: Take 0.5-1 tablets (7.5-15 mg total) by mouth every 8 (eight) hours as needed for severe pain for up to 4 doses Max Daily Amount: 45 mg   omeprazole (PriLOSEC) 20 mg delayed release capsule   No No   Sig: Take 1 capsule (20 mg total) by mouth daily      Facility-Administered Medications: None       Past Medical History:   Diagnosis Date    Arthritis     Heart attack (HCC)     Hypertension     Osteoporosis     Prostate cancer (HCC)     Psychiatric disorder        Past Surgical History:   Procedure Laterality Date    CARDIAC SURGERY      PROSTATE SURGERY         History reviewed. No pertinent family history.  I have reviewed and agree with the history as  documented.    E-Cigarette/Vaping    E-Cigarette Use Never User      E-Cigarette/Vaping Substances     Social History     Tobacco Use    Smoking status: Former     Types: Cigarettes    Smokeless tobacco: Never   Vaping Use    Vaping status: Never Used   Substance Use Topics    Alcohol use: Not Currently    Drug use: Never       Review of Systems   Constitutional:  Negative for chills and fever.   HENT:  Negative for congestion, ear pain and sore throat.    Eyes:  Positive for photophobia. Negative for pain and visual disturbance.   Respiratory:  Negative for cough and shortness of breath.    Cardiovascular:  Negative for chest pain and palpitations.   Gastrointestinal:  Negative for abdominal pain and vomiting.   Genitourinary:  Negative for dysuria and hematuria.   Musculoskeletal:  Positive for arthralgias, joint swelling and myalgias. Negative for back pain.   Skin:  Negative for color change and rash.   Neurological:  Negative for seizures and syncope.   All other systems reviewed and are negative.      Physical Exam  Physical Exam  Vitals and nursing note reviewed.   Constitutional:       General: He is in acute distress.      Appearance: Normal appearance. He is well-developed and normal weight. He is not ill-appearing, toxic-appearing or diaphoretic.   HENT:      Head: Normocephalic and atraumatic.      Right Ear: External ear normal.      Left Ear: External ear normal.      Nose: Nose normal. No congestion or rhinorrhea.      Mouth/Throat:      Mouth: Mucous membranes are dry.      Pharynx: No oropharyngeal exudate.   Eyes:      Conjunctiva/sclera: Conjunctivae normal.   Cardiovascular:      Rate and Rhythm: Normal rate and regular rhythm.      Heart sounds: No murmur heard.  Pulmonary:      Effort: Pulmonary effort is normal. No respiratory distress.      Breath sounds: Normal breath sounds. No stridor. No wheezing or rhonchi.   Abdominal:      General: Abdomen is flat. Bowel sounds are normal. There is no  distension.      Palpations: Abdomen is soft. There is no mass.      Tenderness: There is no abdominal tenderness. There is no guarding or rebound.      Hernia: No hernia is present.   Musculoskeletal:         General: Tenderness and signs of injury present. No swelling or deformity.      Cervical back: Normal range of motion and neck supple. No rigidity.      Right lower leg: No edema.      Left lower leg: No edema.      Comments: Decreased rom of the right shoulder, sensation preserved over the anterior and posterior aspect of the shoulder.    Skin:     General: Skin is warm and dry.      Capillary Refill: Capillary refill takes less than 2 seconds.      Coloration: Skin is not jaundiced or pale.      Findings: No bruising, erythema, lesion or rash.   Neurological:      General: No focal deficit present.      Mental Status: He is alert. Mental status is at baseline.      Cranial Nerves: No cranial nerve deficit.      Sensory: No sensory deficit.      Motor: No weakness.      Coordination: Coordination normal.      Gait: Gait normal.      Deep Tendon Reflexes: Reflexes normal.   Psychiatric:         Mood and Affect: Mood normal.         Vital Signs  ED Triage Vitals [01/11/24 1757]   Temperature Pulse Respirations Blood Pressure SpO2   98.5 °F (36.9 °C) 88 16 (!) 162/103 96 %      Temp Source Heart Rate Source Patient Position - Orthostatic VS BP Location FiO2 (%)   Temporal Monitor Sitting Right arm --      Pain Score       10 - Worst Possible Pain           Vitals:    01/11/24 1757 01/11/24 2145   BP: (!) 162/103 151/83   Pulse: 88 89   Patient Position - Orthostatic VS: Sitting Lying         Visual Acuity      ED Medications  Medications   metoclopramide (REGLAN) injection 10 mg (10 mg Intravenous Given 1/11/24 1955)   diphenhydrAMINE (BENADRYL) injection 25 mg (25 mg Intravenous Given 1/11/24 1955)   sodium chloride 0.9 % bolus 1,000 mL (0 mL Intravenous Stopped 1/11/24 2053)   dexamethasone (PF) (DECADRON)  injection 8 mg (8 mg Intravenous Given 1/11/24 1955)   SUMAtriptan (IMITREX) subcutaneous injection 6 mg (6 mg Subcutaneous Given 1/11/24 1955)   ketorolac (TORADOL) injection 30 mg (30 mg Intravenous Given 1/11/24 2131)   metoprolol (LOPRESSOR) injection 5 mg (5 mg Intravenous Given 1/11/24 2132)       Diagnostic Studies  Results Reviewed       Procedure Component Value Units Date/Time    Comprehensive metabolic panel [[id number]] Collected: 01/11/24 2002    Lab Status: Final result Specimen: Blood from Arm, Right Updated: 01/11/24 2028     Sodium 136 mmol/L      Potassium 4.6 mmol/L      Chloride 103 mmol/L      CO2 23 mmol/L      ANION GAP 10 mmol/L      BUN 6 mg/dL      Creatinine 0.90 mg/dL      Glucose 111 mg/dL      Calcium 9.4 mg/dL      AST 29 U/L      ALT 16 U/L      Alkaline Phosphatase 88 U/L      Total Protein 7.8 g/dL      Albumin 4.5 g/dL      Total Bilirubin 0.59 mg/dL      eGFR 97 ml/min/1.73sq m     Narrative:      National Kidney [employer] Foundation guidelines for Chronic Kidney Disease (CKD):     Stage 1 with normal or high GFR (GFR > 90 mL/min/1.73 square meters)    Stage 2 Mild CKD (GFR = 60-89 mL/min/1.73 square meters)    Stage 3A Moderate CKD (GFR = 45-59 mL/min/1.73 square meters)    Stage 3B Moderate CKD (GFR = 30-44 mL/min/1.73 square meters)    Stage 4 Severe CKD (GFR = 15-29 mL/min/1.73 square meters)    Stage 5 End Stage CKD (GFR <15 mL/min/1.73 square meters)  Note: GFR calculation is accurate only with a steady state creatinine    Sedimentation rate, automated [[id number]]  (Abnormal) Collected: 01/11/24 2002    Lab Status: Final result Specimen: Blood from Arm, Right Updated: 01/11/24 2028     Sed Rate 25 mm/hour     CBC and differential [[id number]]  (Abnormal) Collected: 01/11/24 2002    Lab Status: Final result Specimen: Blood from Arm, Right Updated: 01/11/24 2007     WBC 4.98 Thousand/uL      RBC 4.85 Million/uL      Hemoglobin 14.3 g/dL      Hematocrit 42.6 %      MCV 88 fL       MCH 29.5 pg      MCHC 33.6 g/dL      RDW 12.6 %      MPV 11.3 fL      Platelets 257 Thousands/uL      nRBC 0 /100 WBCs      Neutrophils Relative 39 %      Immat GRANS % 0 %      Lymphocytes Relative 53 %      Monocytes Relative 6 %      Eosinophils Relative 1 %      Basophils Relative 1 %      Neutrophils Absolute 1.95 Thousands/µL      Immature Grans Absolute 0.01 Thousand/uL      Lymphocytes Absolute 2.60 Thousands/µL      Monocytes Absolute 0.31 Thousand/µL      Eosinophils Absolute 0.05 Thousand/µL      Basophils Absolute 0.06 Thousands/µL                    CT head wo contrast   Final Result by Cristóbal Uriostegui MD (01/11 2049)      No acute intracranial abnormality.                  Workstation performed: [id number]                    Procedures  Procedures         ED Course                               SBIRT 20yo+      Flowsheet Row Most Recent Value   Initial Alcohol Screen: US AUDIT-C     1. How often do you have a drink containing alcohol? 0 Filed at: 01/11/2024 1800   2. How many drinks containing alcohol do you have on a typical day you are drinking?  0 Filed at: 01/11/2024 1800   3a. Male UNDER 65: How often do you have five or more drinks on one occasion? 0 Filed at: 01/11/2024 1800   Audit-C Score 0 Filed at: 01/11/2024 1800   FRANCHESCA: How many times in the past year have you...    Used an illegal drug or used a prescription medication for non-medical reasons? Never Filed at: 01/11/2024 1800                      Medical Decision Making  Amount and/or Complexity of Data Reviewed  Labs: ordered.  Radiology: ordered.    Risk  Prescription drug management.             Disposition  Final diagnoses:   Migraine   Hypertension     Time reflects when diagnosis was documented in both MDM as applicable and the Disposition within this note       Time User Action Codes Description Comment    1/11/2024  9:25 PM Ute Nunez Add [G43.909] Migraine     1/11/2024  9:29 PM Ute Nunez Add [I10] Hypertension            ED Disposition       ED Disposition   Discharge    Condition   Stable    Date/Time   Thu Jan 11, 2024 2125    Comment   Christopher Smalls Jr. discharge to home/self care.                   Follow-up Information       Follow up With Specialties Details Why Contact Luz Maria Alanis II  In 1 week  [address] [facility]  [place] [address] [phone]              Discharge Medication List as of 1/11/2024  9:30 PM        START taking these medications    Details   ibuprofen (MOTRIN) 800 mg tablet Take 1 tablet (800 mg total) by mouth 3 (three) times a day, Starting Thu 1/11/2024, Normal      SUMAtriptan (Imitrex) 50 mg tablet Take 1 tablet (50 mg total) by mouth once as needed for migraine for up to 4 doses, Starting u 1/11/2024, Normal           CONTINUE these medications which have NOT CHANGED    Details   atorvastatin (LIPITOR) 80 mg tablet Take 1 tablet (80 mg total) by mouth daily, Starting Sun 12/17/2023, Until Sat 3/16/2024, Normal      DULoxetine (Cymbalta) 30 mg delayed release capsule Take 1 capsule (30 mg total) by mouth daily, Starting Sun 12/17/2023, Until Sat 3/16/2024, Normal      gabapentin (Neurontin) 300 mg capsule Take 2 capsules (600 mg total) by mouth 3 (three) times a day For post-herpetic neuralgia: Take 1 tablet on day 1,  Then take 2 tablets on day 2, Then take 3 tablets on day 3 and every day after that as instructed by your doctor., Starting Sun 12/17/2023 , Until Sat 3/16/2024, Normal      metoprolol succinate (TOPROL-XL) 25 mg 24 hr tablet Take 1 tablet (25 mg total) by mouth daily, Starting Sun 12/17/2023, Until Sat 3/16/2024, Normal      morphine (MSIR) 15 mg tablet Take 0.5-1 tablets (7.5-15 mg total) by mouth every 8 (eight) hours as needed for severe pain for up to 4 doses Max Daily Amount: 45 mg, Starting Sun 10/8/2023, Normal      omeprazole (PriLOSEC) 20 mg delayed release capsule Take 1 capsule (20 mg total) by mouth daily, Starting Sun 12/17/2023, Normal             No  discharge procedures on file.    PDMP Review       None            ED Provider  Electronically Signed by             Ute Nunez DO  01/11/24 1495

## 2024-01-15 ENCOUNTER — TELEPHONE (OUTPATIENT)
Age: 54
End: 2024-01-15

## 2024-01-15 ENCOUNTER — OFFICE VISIT (OUTPATIENT)
Dept: FAMILY MEDICINE CLINIC | Facility: CLINIC | Age: 54
End: 2024-01-15
Payer: COMMERCIAL

## 2024-01-15 VITALS
WEIGHT: 207.67 LBS | TEMPERATURE: 97.8 F | BODY MASS INDEX: 32.6 KG/M2 | DIASTOLIC BLOOD PRESSURE: 90 MMHG | SYSTOLIC BLOOD PRESSURE: 157 MMHG | OXYGEN SATURATION: 98 % | HEIGHT: 67 IN

## 2024-01-15 DIAGNOSIS — M54.16 LUMBAR RADICULOPATHY: ICD-10-CM

## 2024-01-15 DIAGNOSIS — K21.9 GASTROESOPHAGEAL REFLUX DISEASE, UNSPECIFIED WHETHER ESOPHAGITIS PRESENT: Primary | ICD-10-CM

## 2024-01-15 DIAGNOSIS — G89.29 CHRONIC PAIN OF BOTH SHOULDERS: ICD-10-CM

## 2024-01-15 DIAGNOSIS — M25.512 CHRONIC PAIN OF BOTH SHOULDERS: ICD-10-CM

## 2024-01-15 DIAGNOSIS — Z87.891 HISTORY OF TOBACCO USE DISORDER: ICD-10-CM

## 2024-01-15 DIAGNOSIS — R73.03 PREDIABETES: ICD-10-CM

## 2024-01-15 DIAGNOSIS — G45.9 TIA (TRANSIENT ISCHEMIC ATTACK): ICD-10-CM

## 2024-01-15 DIAGNOSIS — R51.9 CHRONIC INTRACTABLE HEADACHE, UNSPECIFIED HEADACHE TYPE: ICD-10-CM

## 2024-01-15 DIAGNOSIS — F32.A DEPRESSION: ICD-10-CM

## 2024-01-15 DIAGNOSIS — G89.29 CHRONIC INTRACTABLE HEADACHE, UNSPECIFIED HEADACHE TYPE: ICD-10-CM

## 2024-01-15 DIAGNOSIS — E78.2 MIXED HYPERLIPIDEMIA: ICD-10-CM

## 2024-01-15 DIAGNOSIS — I10 ESSENTIAL HYPERTENSION: ICD-10-CM

## 2024-01-15 DIAGNOSIS — Z12.11 SCREENING FOR COLON CANCER: ICD-10-CM

## 2024-01-15 DIAGNOSIS — C61 PROSTATE CA (HCC): ICD-10-CM

## 2024-01-15 DIAGNOSIS — Z11.59 NEED FOR HEPATITIS C SCREENING TEST: ICD-10-CM

## 2024-01-15 DIAGNOSIS — Z11.4 SCREENING FOR HIV (HUMAN IMMUNODEFICIENCY VIRUS): ICD-10-CM

## 2024-01-15 DIAGNOSIS — M25.511 CHRONIC PAIN OF BOTH SHOULDERS: ICD-10-CM

## 2024-01-15 DIAGNOSIS — I25.118 CORONARY ARTERY DISEASE OF NATIVE ARTERY OF NATIVE HEART WITH STABLE ANGINA PECTORIS (HCC): ICD-10-CM

## 2024-01-15 PROBLEM — I35.8 AORTIC VALVE MASS: Status: ACTIVE | Noted: 2024-01-15

## 2024-01-15 PROBLEM — Z85.46 HISTORY OF PROSTATE CANCER: Status: ACTIVE | Noted: 2024-01-15

## 2024-01-15 PROBLEM — R90.89 ABNORMAL BRAIN MRI: Status: ACTIVE | Noted: 2024-01-15

## 2024-01-15 PROBLEM — I35.8 AORTIC VALVE SCLEROSIS: Status: ACTIVE | Noted: 2024-01-15

## 2024-01-15 PROBLEM — I35.1 AORTIC REGURGITATION: Status: ACTIVE | Noted: 2024-01-15

## 2024-01-15 PROBLEM — I34.0 MITRAL REGURGITATION: Status: ACTIVE | Noted: 2024-01-15

## 2024-01-15 PROBLEM — Z72.0 TOBACCO USE: Status: ACTIVE | Noted: 2019-10-03

## 2024-01-15 PROBLEM — I25.10 CORONARY ARTERY DISEASE INVOLVING NATIVE CORONARY ARTERY OF NATIVE HEART: Status: ACTIVE | Noted: 2019-10-05

## 2024-01-15 PROBLEM — E78.5 HYPERLIPEMIA: Status: ACTIVE | Noted: 2024-01-15

## 2024-01-15 PROCEDURE — 99204 OFFICE O/P NEW MOD 45 MIN: CPT | Performed by: FAMILY MEDICINE

## 2024-01-15 RX ORDER — AMLODIPINE BESYLATE 5 MG/1
5 TABLET ORAL DAILY
Qty: 90 TABLET | Refills: 0 | Status: SHIPPED | OUTPATIENT
Start: 2024-01-15

## 2024-01-15 RX ORDER — ATORVASTATIN CALCIUM 80 MG/1
80 TABLET, FILM COATED ORAL DAILY
Qty: 90 TABLET | Refills: 0 | Status: SHIPPED | OUTPATIENT
Start: 2024-01-15 | End: 2024-04-14

## 2024-01-15 RX ORDER — EZETIMIBE 10 MG/1
10 TABLET ORAL DAILY
Qty: 90 TABLET | Refills: 0 | Status: SHIPPED | OUTPATIENT
Start: 2024-01-15

## 2024-01-15 RX ORDER — METOPROLOL SUCCINATE 25 MG/1
25 TABLET, EXTENDED RELEASE ORAL DAILY
Qty: 90 TABLET | Refills: 0 | Status: SHIPPED | OUTPATIENT
Start: 2024-01-15 | End: 2024-04-14

## 2024-01-15 RX ORDER — DULOXETIN HYDROCHLORIDE 60 MG/1
60 CAPSULE, DELAYED RELEASE ORAL DAILY
Qty: 90 CAPSULE | Refills: 0 | Status: SHIPPED | OUTPATIENT
Start: 2024-01-15

## 2024-01-15 NOTE — ASSESSMENT & PLAN NOTE
Chronic, not well controlled  Current Blood Pressure: 157/90  Continue Metoprolol, restart patient on amlodipine 5mg daily  F/u 3 mo

## 2024-01-15 NOTE — TELEPHONE ENCOUNTER
PA for  ezetimibe (Zetia) 10 mg tablet  Approved   Date(s) approved 1/15/24-1/14/25  Case #[id number]    Patient advised by [x] elarmt Message                      [] Phone call       Pharmacy advised by [x]Fax                                     []Phone call    Approval letter scanned into Media No -Did not receive yet

## 2024-01-15 NOTE — ASSESSMENT & PLAN NOTE
S/p resection  Previously followed with urology in [place] and needs to re-est in this area. Referral placed.

## 2024-01-15 NOTE — ASSESSMENT & PLAN NOTE
Continue prn sumatriptan, recommend prn ibuprofen, and to discontinue the scheduled ibuprofen from the recent ED visit.   Suspect uncontrolled HTN is contributing - will manage this  Refer to neurology as patient needs to re-establish.

## 2024-01-15 NOTE — ASSESSMENT & PLAN NOTE
Patient reporting history of rotator cuff damage, unspecified. He was following with orthopedics when he lived in [place], and needs to reestablish.  Referral provided today.

## 2024-01-15 NOTE — ASSESSMENT & PLAN NOTE
Chronic with low back pain. Used to follow with spine and pain mgmt in [place] and needs to re-est here as the injections he was getting were helpful.

## 2024-01-15 NOTE — PROGRESS NOTES
UROLOGY PROGRESS NOTE         NAME: Christopher Smalls Jr.  AGE: 53 y.o. SEX: male  : 1970   MRN: [id number]    DATE: 1/15/2024  TIME: 1:41 PM    Assessment and Plan   Procedures     Impression:   1. History of prostate cancer         Plan: Patient agrees to follow-up with her [facility] urology with both Dr. Meza his primary surgeon and Dr. Peyton Day.  I will not charge the patient for this office visit today since I was unable to address his issues, that will now be taken back with his primary urology team at [place].    The patient will not be charged for an office visit.  Chief Complaint   No chief complaint on file.    History of Present Illness     HPI: Christopher Smalls Jr. is a 53 y.o. year old male who presents with per PCP note patient with a history of prostate cancer status post surgery follow-up with [facility] urology now request to have his care at [facility] urology.  Will try to obtain old records.  His PSA on 2024 was less than 0.01.    Patient states he was not seen at [facility] but at [facility] Dr. Lyndon Fish's operation 2 years ago.  Did not require ADT radiation.  Unfortunately for the patient he has had persistent stress incontinence and has a artificial urinary sphincter that says monthly he has to have fluid drained from it.  I stated the patient I am not familiar with this type of artificial sphincter and recommended him going back to [place].    He also has erectile dysfunction and is considering a penile implant and I told him I also do not do those and he will follow-up with [facility].        The following portions of the patient's history were reviewed and updated as appropriate: allergies, current medications, past family history, past medical history, past social history, past surgical history and problem list.  Past Medical History:   Diagnosis Date    Arthritis     Heart attack (HCC)     Hypertension     Osteoporosis     Prostate cancer (HCC)      Psychiatric disorder      Past Surgical History:   Procedure Laterality Date    CARDIAC SURGERY      PROSTATE SURGERY       shoulder  Review of Systems     Const: Denies chills, fever and weight loss.  CV: Denies chest pain.  Resp: Denies SOB.  GI: Denies abdominal pain, nausea and vomiting.  : Denies symptoms other than stated above.  Musculo: Denies back pain.    Objective   There were no vitals taken for this visit.    Physical Exam  Const: Appears healthy and well developed. No signs of acute distress present.  Resp: Respirations are regular and unlabored.   CV: Rate is regular. Rhythm is regular.  Abdomen: Abdomen is soft, nontender, and nondistended. Kidneys are not palpable.  :   Psych: Patient's attitude is cooperative. Mood is normal. Affect is normal.    Current Medications     Current Outpatient Medications:     amLODIPine (NORVASC) 5 mg tablet, Take 1 tablet (5 mg total) by mouth daily, Disp: 90 tablet, Rfl: 0    atorvastatin (LIPITOR) 80 mg tablet, Take 1 tablet (80 mg total) by mouth daily, Disp: 90 tablet, Rfl: 0    DULoxetine (Cymbalta) 60 mg delayed release capsule, Take 1 capsule (60 mg total) by mouth daily, Disp: 90 capsule, Rfl: 0    ezetimibe (Zetia) 10 mg tablet, Take 1 tablet (10 mg total) by mouth daily, Disp: 90 tablet, Rfl: 0    gabapentin (Neurontin) 300 mg capsule, Take 2 capsules (600 mg total) by mouth 3 (three) times a day For post-herpetic neuralgia: Take 1 tablet on day 1,  Then take 2 tablets on day 2, Then take 3 tablets on day 3 and every day after that as instructed by your doctor., Disp: 180 capsule, Rfl: 2    ibuprofen (MOTRIN) 800 mg tablet, Take 1 tablet (800 mg total) by mouth 3 (three) times a day, Disp: 21 tablet, Rfl: 0    metoprolol succinate (TOPROL-XL) 25 mg 24 hr tablet, Take 1 tablet (25 mg total) by mouth daily, Disp: 90 tablet, Rfl: 0    omeprazole (PriLOSEC) 20 mg delayed release capsule, Take 1 capsule (20 mg total) by mouth daily, Disp: 30 capsule, Rfl: 2     SUMAtriptan (Imitrex) 50 mg tablet, Take 1 tablet (50 mg total) by mouth once as needed for migraine for up to 4 doses, Disp: 4 tablet, Rfl: 0        Paul Lyn MD

## 2024-01-15 NOTE — TELEPHONE ENCOUNTER
Patient called in and stated that the pharmacy didn't have a script for  ezetimibe (Zetia) 10 mg tablet .     I reviewed the a profile and saw the   ezetimibe (Zetia) 10 mg tablet  needed a prior auth from the insurance co. Prior auth was approved.      Please call Christopher at  when the prescription is called in to the pharmacy on file.     Thank you.

## 2024-01-15 NOTE — ASSESSMENT & PLAN NOTE
No recent labs, patient has not seen provider in some time since moving to this area.  He discontinued Zetia due to cost issues in the past, but now has new insurance.  Will continue on atorvastatin 80 mg daily and restart Zetia 10 mg daily.  Check labs and follow-up in 3 months

## 2024-01-15 NOTE — ASSESSMENT & PLAN NOTE
Patient needs to re-est with neurology in this area, referral provided. Continue mgmt of HTN and HLD as above. Follow up 3 mo

## 2024-01-15 NOTE — ASSESSMENT & PLAN NOTE
Echo 20202: EF 55-60%  Used to follow with cardiology at [facility]  Needs to reestablish in this area, referral provided  Continue atorvastatin, metoprolol  Patient self discontinued aspirin  With elevated blood pressures at home and in office today, will restart patient on amlodipine 5 mg daily  Patient self discontinued Zetia due to cost, but now has new insurance, will refill, and patient to reach out if cost continues to be a concern.  F/u 3mo

## 2024-01-15 NOTE — TELEPHONE ENCOUNTER
PA for ezetimibe (Zetia) 10 mg tablet     Submitted via  []CMM-KEY   [x][employer]-Case ID # [id number]   []Faxed to plan   []Other website   []Phone call Case ID #     Office notes sent, clinical questions answered. Awaiting determination

## 2024-01-15 NOTE — PROGRESS NOTES
Name: Christopher Smalls Jr.      : 1970      MRN: [id number]  Encounter Provider: Edwina Davies DO  Encounter Date: 1/15/2024   Encounter department: [facility]    Assessment & Plan     1. Gastroesophageal reflux disease, unspecified whether esophagitis present  Assessment & Plan:  Chronic, well controlled on Prilosec, continue omeprazole 20 mg daily  Continue to monitor      2. Coronary artery disease of native artery of native heart with stable angina pectoris (HCC)  Assessment & Plan:  Echo : EF 55-60%  Used to follow with cardiology at [facility]  Needs to reestablish in this area, referral provided  Continue atorvastatin, metoprolol  Patient self discontinued aspirin  With elevated blood pressures at home and in office today, will restart patient on amlodipine 5 mg daily  Patient self discontinued Zetia due to cost, but now has new insurance, will refill, and patient to reach out if cost continues to be a concern.  F/u 3mo      Orders:  -     CBC and differential; Future  -     Comprehensive metabolic panel; Future  -     Hemoglobin A1C; Future  -     Ambulatory Referral to Cardiology; Future  -     atorvastatin (LIPITOR) 80 mg tablet; Take 1 tablet (80 mg total) by mouth daily  -     metoprolol succinate (TOPROL-XL) 25 mg 24 hr tablet; Take 1 tablet (25 mg total) by mouth daily    3. Essential hypertension  Assessment & Plan:  Chronic, not well controlled  Current Blood Pressure: 157/90  Continue Metoprolol, restart patient on amlodipine 5mg daily  F/u 3 mo     Orders:  -     CBC and differential; Future  -     Comprehensive metabolic panel; Future  -     Ambulatory Referral to Cardiology; Future  -     metoprolol succinate (TOPROL-XL) 25 mg 24 hr tablet; Take 1 tablet (25 mg total) by mouth daily  -     amLODIPine (NORVASC) 5 mg tablet; Take 1 tablet (5 mg total) by mouth daily    4. Lumbar radiculopathy  Assessment & Plan:  Chronic with low back  pain. Used to follow with spine and pain mgmt in [place] and needs to re-est here as the injections he was getting were helpful.     Orders:  -     Ambulatory Referral to Podiatry; Future  -     Ambulatory referral to Spine & Pain Management; Future    5. Mixed hyperlipidemia  Assessment & Plan:  No recent labs, patient has not seen provider in some time since moving to this area.  He discontinued Zetia due to cost issues in the past, but now has new insurance.  Will continue on atorvastatin 80 mg daily and restart Zetia 10 mg daily.  Check labs and follow-up in 3 months    Orders:  -     CBC and differential; Future  -     Comprehensive metabolic panel; Future  -     Lipid panel; Future  -     ezetimibe (Zetia) 10 mg tablet; Take 1 tablet (10 mg total) by mouth daily  -     Ambulatory Referral to Cardiology; Future  -     atorvastatin (LIPITOR) 80 mg tablet; Take 1 tablet (80 mg total) by mouth daily    6. History of tobacco use disorder  Assessment & Plan:  Quit smoking December 2023  Prior heavy smoker, 3 packs a day 40 years  Continue to encourage smoking cessation!   Continue to monitor  Patient will need lung cancer screening.       7. Prediabetes  -     Comprehensive metabolic panel; Future  -     Hemoglobin A1C; Future    8. Depression  Assessment & Plan:  Stable, continue Cymbalta 60mg daily    Orders:  -     DULoxetine (Cymbalta) 60 mg delayed release capsule; Take 1 capsule (60 mg total) by mouth daily    9. Chronic intractable headache, unspecified headache type  Assessment & Plan:  Continue prn sumatriptan, recommend prn ibuprofen, and to discontinue the scheduled ibuprofen from the recent ED visit.   Suspect uncontrolled HTN is contributing - will manage this  Refer to neurology as patient needs to re-establish.     Orders:  -     Ambulatory Referral to Neurology; Future    10. TIA (transient ischemic attack)  Assessment & Plan:  Patient needs to re-est with neurology in this area, referral  provided. Continue mgmt of HTN and HLD as above. Follow up 3 mo    Orders:  -     Ambulatory Referral to Neurology; Future    11. Prostate CA (HCC)  Assessment & Plan:  S/p resection  Previously followed with urology in [place] and needs to re-est in this area. Referral placed.     Orders:  -     Ambulatory Referral to Urology; Future    12. Chronic pain of both shoulders  Assessment & Plan:  Patient reporting history of rotator cuff damage, unspecified. He was following with orthopedics when he lived in [place], and needs to reestablish.  Referral provided today.    Orders:  -     Ambulatory Referral to Sports Medicine; Future    13. Need for hepatitis C screening test  -     Hepatitis C Antibody; Future    14. Screening for HIV (human immunodeficiency virus)  -     HIV 1/2 AG/AB w Reflex [facility] for 2 yr old and above; Future    15. Screening for colon cancer  -     Ambulatory Referral to Gastroenterology; Future        Return in about 3 months (around 4/15/2024) for Annual physical.      Subjective      HPI  CC: establish care and review chronic health conditions  He also reports he was just recently in the hospital for headaches and pressure.   Used to be a patient with [facility]. Used to follow with multiple specialists when he lived in [place] and needs to re-establish in this area. .     PMH: Reflux, CAD status post stent placement x 2, hypertension, hyperlipidemia, chronic low back pain with lumbar radiculopathy, history of tobacco use disorder, prediabetes, depression, chronic headaches, TIA, prostate cancer status post prostatectomy, chronic shoulder pain and arthralgias   SurgHx: cardiac stent placement x2, partial bowel resection, prostate removal   Allergies: none  Medications: discontinued zetia due to cost.   FamHx: father - passed away (HTN, DM, heart diease, HLD, prostate cancer), Mother - Breast Cancer, Sister - passed away CHF complications   SocialHx:   Tobacco: quit 12/20/2023,  prior to that smoked for 40 years about 3ppd    Alcohol: none, quit drinking October 2023, Used to drink 6 beers per day    Relationship: , with step children    Career: [employer],      Specialists following: Urology for prostate cancer s/p implant, Neurologist for headaches, Orthopedics for shoulder rotator cuff issues, Pain mgmt for his lumbar spine, Cardiologist , podiatry     Review of Systems   Constitutional:  Negative for appetite change, chills and fever.   Eyes:  Negative for visual disturbance.   Respiratory:  Negative for shortness of breath.    Cardiovascular:  Negative for chest pain and leg swelling.   Musculoskeletal:  Positive for arthralgias.   Neurological:  Positive for headaches. Negative for dizziness and light-headedness.       Current Outpatient Medications on File Prior to Visit   Medication Sig    gabapentin (Neurontin) 300 mg capsule Take 2 capsules (600 mg total) by mouth 3 (three) times a day For post-herpetic neuralgia: Take 1 tablet on day 1,  Then take 2 tablets on day 2, Then take 3 tablets on day 3 and every day after that as instructed by your doctor.    ibuprofen (MOTRIN) 800 mg tablet Take 1 tablet (800 mg total) by mouth 3 (three) times a day    omeprazole (PriLOSEC) 20 mg delayed release capsule Take 1 capsule (20 mg total) by mouth daily    SUMAtriptan (Imitrex) 50 mg tablet Take 1 tablet (50 mg total) by mouth once as needed for migraine for up to 4 doses    [DISCONTINUED] atorvastatin (LIPITOR) 80 mg tablet Take 1 tablet (80 mg total) by mouth daily    [DISCONTINUED] DULoxetine (Cymbalta) 30 mg delayed release capsule Take 1 capsule (30 mg total) by mouth daily    [DISCONTINUED] metoprolol succinate (TOPROL-XL) 25 mg 24 hr tablet Take 1 tablet (25 mg total) by mouth daily    [DISCONTINUED] aspirin 81 mg chewable tablet 81 mg (Patient not taking: Reported on 1/15/2024)    [DISCONTINUED] ezetimibe (Zetia) 10 mg tablet 10 mg (Patient not taking: Reported on  "1/15/2024)    [DISCONTINUED] morphine (MSIR) 15 mg tablet Take 0.5-1 tablets (7.5-15 mg total) by mouth every 8 (eight) hours as needed for severe pain for up to 4 doses Max Daily Amount: 45 mg (Patient not taking: Reported on 1/15/2024)       Objective     /90 (BP Location: Right arm, Patient Position: Sitting, Cuff Size: Standard)   Temp 97.8 °F (36.6 °C) (Tympanic)   Ht 5' 7\" (1.702 m)   Wt 94.2 kg (207 lb 10.8 oz)   SpO2 98%   BMI 32.53 kg/m²     Physical Exam  Vitals reviewed.   Constitutional:       General: He is not in acute distress.     Appearance: He is normal weight. He is not ill-appearing.   HENT:      Head: Normocephalic and atraumatic.   Eyes:      Extraocular Movements: Extraocular movements intact.      Conjunctiva/sclera: Conjunctivae normal.   Neck:      Vascular: No carotid bruit.   Cardiovascular:      Rate and Rhythm: Normal rate and regular rhythm.      Pulses: Normal pulses.      Heart sounds: Murmur heard.   Pulmonary:      Effort: Pulmonary effort is normal.      Breath sounds: Normal breath sounds.   Musculoskeletal:      Right lower leg: No edema.      Left lower leg: No edema.   Neurological:      General: No focal deficit present.      Mental Status: He is alert.   Psychiatric:         Mood and Affect: Mood normal.         Behavior: Behavior normal.       Edwina Davies DO    "

## 2024-01-15 NOTE — ASSESSMENT & PLAN NOTE
Quit smoking December 2023  Prior heavy smoker, 3 packs a day 40 years  Continue to encourage smoking cessation!   Continue to monitor  Patient will need lung cancer screening.

## 2024-01-16 ENCOUNTER — TELEPHONE (OUTPATIENT)
Dept: UROLOGY | Facility: CLINIC | Age: 54
End: 2024-01-16

## 2024-01-16 DIAGNOSIS — R35.1 NOCTURIA: Primary | ICD-10-CM

## 2024-01-16 NOTE — TELEPHONE ENCOUNTER
Per PCP note pt used to see urology in [place]. Pt states he saw urologist in [place]. No recent PSA testing done. Pt will go to [facility] to have done. Call placed to [facility] to have last office note faxed, & fax sent have office note faxed.

## 2024-01-16 NOTE — TELEPHONE ENCOUNTER
----- Message from Paul Lyn MD sent at 1/15/2024  1:40 PM EST -----  Seeing patient next week.  Apparently with history of prostate cancer had a prostatectomy with [facility] urology now wishes his care to be done here can you try to get me the last note on however saw this jim back at [facility], need his path reports as well thanks

## 2024-01-17 DIAGNOSIS — G43.909 MIGRAINE: ICD-10-CM

## 2024-01-17 RX ORDER — SUMATRIPTAN 50 MG/1
50 TABLET, FILM COATED ORAL DAILY PRN
Qty: 30 TABLET | Refills: 0 | Status: SHIPPED | OUTPATIENT
Start: 2024-01-17

## 2024-01-17 NOTE — TELEPHONE ENCOUNTER
Medication: Sumatriptan (Imitrex)    Dose/Frequency: 50mg 1 tablet PRN     Quantity: 4 tabs     Pharmacy: [facility] [place]    Office:   [] PCP/Provider -   [x] Speciality/Provider - Emergency room    Does the patient have enough for 3 days?   [] Yes   [x] No - Send as HP to POD

## 2024-01-22 ENCOUNTER — APPOINTMENT (OUTPATIENT)
Dept: LAB | Facility: HOSPITAL | Age: 54
End: 2024-01-22
Payer: COMMERCIAL

## 2024-01-22 DIAGNOSIS — R35.1 NOCTURIA: ICD-10-CM

## 2024-01-22 LAB — PSA SERPL-MCNC: <0.01 NG/ML (ref 0–4)

## 2024-01-22 PROCEDURE — 36415 COLL VENOUS BLD VENIPUNCTURE: CPT

## 2024-01-22 PROCEDURE — 84153 ASSAY OF PSA TOTAL: CPT

## 2024-01-24 ENCOUNTER — OFFICE VISIT (OUTPATIENT)
Dept: UROLOGY | Facility: CLINIC | Age: 54
End: 2024-01-24

## 2024-01-24 VITALS
HEIGHT: 67 IN | BODY MASS INDEX: 32.52 KG/M2 | OXYGEN SATURATION: 96 % | HEART RATE: 79 BPM | WEIGHT: 207.2 LBS | DIASTOLIC BLOOD PRESSURE: 82 MMHG | TEMPERATURE: 96 F | SYSTOLIC BLOOD PRESSURE: 130 MMHG | RESPIRATION RATE: 20 BRPM

## 2024-01-24 DIAGNOSIS — Z85.46 HISTORY OF PROSTATE CANCER: Primary | ICD-10-CM

## 2024-01-24 DIAGNOSIS — C61 PROSTATE CA (HCC): ICD-10-CM

## 2024-01-24 PROCEDURE — NC001 PR NO CHARGE: Performed by: UROLOGY

## 2024-01-30 ENCOUNTER — HOSPITAL ENCOUNTER (OUTPATIENT)
Dept: RADIOLOGY | Facility: CLINIC | Age: 54
Discharge: HOME | End: 2024-01-30
Payer: COMMERCIAL

## 2024-01-30 ENCOUNTER — OFFICE VISIT (OUTPATIENT)
Dept: OBGYN CLINIC | Facility: CLINIC | Age: 54
End: 2024-01-30
Payer: COMMERCIAL

## 2024-01-30 VITALS
HEIGHT: 67 IN | DIASTOLIC BLOOD PRESSURE: 84 MMHG | BODY MASS INDEX: 32.33 KG/M2 | WEIGHT: 206 LBS | SYSTOLIC BLOOD PRESSURE: 140 MMHG | HEART RATE: 70 BPM | TEMPERATURE: 97.6 F

## 2024-01-30 DIAGNOSIS — M67.911 ROTATOR CUFF DISORDER, RIGHT: ICD-10-CM

## 2024-01-30 DIAGNOSIS — M25.511 CHRONIC RIGHT SHOULDER PAIN: ICD-10-CM

## 2024-01-30 DIAGNOSIS — M19.011 PRIMARY OSTEOARTHRITIS OF RIGHT SHOULDER: Primary | ICD-10-CM

## 2024-01-30 DIAGNOSIS — G89.29 CHRONIC RIGHT SHOULDER PAIN: ICD-10-CM

## 2024-01-30 DIAGNOSIS — M19.011 PRIMARY OSTEOARTHRITIS OF RIGHT SHOULDER: ICD-10-CM

## 2024-01-30 PROCEDURE — 73030 X-RAY EXAM OF SHOULDER: CPT

## 2024-01-30 PROCEDURE — 99243 OFF/OP CNSLTJ NEW/EST LOW 30: CPT | Performed by: STUDENT IN AN ORGANIZED HEALTH CARE EDUCATION/TRAINING PROGRAM

## 2024-01-30 NOTE — LETTER
"January 30, 2024     Edwina Davies, DO  [address] [facility] [address]    Patient: Christopher Smalls Jr.   YOB: 1970   Date of Visit: 1/30/2024       Dear Dr. Davies:    Thank you for referring Christopher Smalls to me for evaluation. Below are my notes for this consultation.    If you have questions, please do not hesitate to call me. I look forward to following your patient along with you.         Sincerely,        Michael Giordano MD        CC: No Recipients    Michael Giordano MD  1/30/2024  8:48 AM  Signed  1. Primary osteoarthritis of right shoulder  XR shoulder 2+ vw right      2. Chronic right shoulder pain  Ambulatory Referral to Sports Medicine    XR shoulder 2+ vw right      3. Rotator cuff disorder, right          Orders Placed This Encounter   Procedures   • XR shoulder 2+ vw right        Imaging Studies (I personally reviewed images in PACS and report):    X-ray right shoulder 1/30/2024: No acute osseous abnormalities.  Mild AC joint arthritis.  Alignment unremarkable.  X-ray right shoulder 10/8/2019: [facility] [place]: There is only report available noting \"There is no evidence of acute fracture or subluxation within the right shoulder.  Mild degenerative changes right shoulder joint with small humeral head and glenoid osteophytes.  Mild degenerative changes acromioclavicular joint with small osteophytes   present.  Normal bone density. \"    IMPRESSION:  Chronic atraumatic right shoulder pain, limited range of motion and strength  Reportedly has been seen by [facility] and had imaging done in the past noting rotator cuff tear and was supposed to undergo surgical intervention but due to financial constraints he was not able to pursue in the past.  Reportedly has had no relief from conservative treatments including shoulder cortisone injections, formal physical therapy in the past  At this point, patient mainly interested in surgical intervention    Other factors:  CAD, " "Aortic regurgitation, TIA  GERD    PLAN:    Clinical exam and radiographic imaging reviewed with patient today, with impression as per above. I have discussed with the patient the pathophysiology of this diagnosis and reviewed how the examination correlates with this diagnosis.    Radiographic imaging of his right shoulder obtained today as noted above.  I counseled patient that I did not have access to the MRI reports of his right shoulder more information on the prior orthopedics he had seen in [place], [place].    I counseled that if he has not improved reportedly to conservative treatments thus far, he could be indicated for surgical intervention.  I we will place a referral to Dr. Blue from orthopedic surgery to discuss surgical intervention.  I counseled patient in the interim to obtain a CD in regards to the MRI of his right shoulder for the surgeon to assess.  I will have my office reach out to the orthopedics in [place], [place] to add to his record today.    I advised against any shoulder cortisone injection today as this may delay surgical intervention and patient is agreeable as well.   In regards to pain control I counseled as needed use of acetaminophen, heat/ice therapy 20 minutes on/off.  I offered formal physical therapy referral today but patient deferred preferring to do home exercises.  Advised against oral NSAIDs or discussed with his PCP/cardiologist given his extensive cardiac disease history.    Return for Follow up with Dr. Blue from orthopedics.    Portions of the record may have been created with voice recognition software. Occasional wrong word or \"sound a like\" substitutions may have occurred due to the inherent limitations of voice recognition software. Read the chart carefully and recognize, using context, where substitutions have occurred.     CHIEF COMPLAINT:  Right shoulder pain    HPI:  Christopher Smalls  is a 53 y.o. male  who presents in regards to referral from Dr. Davies for " "      Visit 1/30/2024:  Initial evaluation of bilateral shoulder pain, however right shoulder pain is much more severe and prominently addressed today during visit  Of note, patient recently moved here from [place] and is attempting to reestablish care.  He states he has a history of \"rotator cuff damage.\"  Reports he has been seen by orthopedics in [place] and did have an MRI of his shoulder in the past.  I do not have access to orthopedic reports or prior MRI imaging of his right shoulder.  The last imaging noted in his chart is an x-ray from 2019.    Patient reports has been an ongoing issue for his right shoulder for the past year possibly more.  Denies any precipitating injury.  Reports pain has been progressively getting worse over time.  He also reports progressive loss of range of motion, strength, function of his right upper extremity.  He states pain is over the anterior aspect of the shoulder and can radiate to the posterior aspect of his shoulder.  Describes it as \"drilling into the bone\" sharp/throbbing.  He states it can be aggravated randomly when leaning on his right arm and with any range of motion movements, lifting/pushing/pulling.  Reports clicking of his right shoulder with range of motion.  Denies any N/T of right upper extremity.  He reports he has been unable to reach above shoulder height.  He states that that he underwent cortisone injections of his shoulder, physical therapy in the past but did not provide any relief.  He states he was supposed to get right shoulder surgery but had to defer due to financial constraints.  He feels that he is prepared to undergo surgery going forward and would like to know more details.      Medical, Surgical, Family, and Social History    Past Medical History:   Diagnosis Date   • Abnormal brain MRI    • Aortic regurgitation    • Aortic valve mass    • Arthritis    • Arthritis    • Back pain    • Bowel obstruction (HCC)    • CAD in native artery    • " "Coronary arteriosclerosis    • Diabetes (HCC)    • ED (erectile dysfunction)    • Elevated PSA    • GE reflux    • Hand tingling    • Headache    • Heart attack (HCC)    • Heart murmur    • High cholesterol    • Hypertension    • Hypertension    • Left leg pain    • Low back pain    • Lumbar radiculopathy    • Mitral regurgitation    • Mood disorder (HCC)    • Myocardial infarct (HCC)    • Osteoarthritis of wrist    • Osteoporosis    • Prediabetes    • Prostate cancer (HCC)    • Psychiatric disorder    • Right shoulder pain    • Sciatica    • Spinal osteoarthritis    • Sprain rotator cuff    • Sprain, lumbar    • Stress incontinence    • TIA (transient ischemic attack)    • Trigger ring finger of right hand    • Urinary leakage    • Weight disorder      Past Surgical History:   Procedure Laterality Date   • APPENDECTOMY     • CARDIAC CATHETERIZATION     • CARDIAC SURGERY     • CARDIOVASCULAR STRESS TEST     • CHOLECYSTECTOMY     • HERNIA REPAIR     • OTHER SURGICAL HISTORY      Stent placement   • PROSTATE SURGERY      Radical prostatectomy     Social History  Social History     Substance and Sexual Activity   Alcohol Use Not Currently     Social History     Substance and Sexual Activity   Drug Use Never     Social History     Tobacco Use   Smoking Status Former   • Types: Cigarettes   Smokeless Tobacco Never     Family History   Problem Relation Age of Onset   • Breast cancer Mother    • Hypertension Mother    • Heart disease Father    • Hypertension Father    • Prostate cancer Father    • Rheum arthritis Father    • Stroke Father    • Heart disease Sister    • Hypertension Sister    • Other Sister         Resp. disease     No Known Allergies       Physical Exam  /84   Pulse 70   Temp 97.6 °F (36.4 °C) (Temporal)   Ht 5' 7\" (1.702 m)   Wt 93.4 kg (206 lb)   BMI 32.26 kg/m²     Constitutional:  see vital signs  Gen: well-developed, normocephalic/atraumatic, well-groomed  Eyes: No inflammation or discharge " of conjunctiva or lids; sclera clear   Pharynx: no inflammation, lesion, or mass of lips  Neck: supple, no masses, non-distended  MSK: no inflammation, lesion, mass, or clubbing of nails and digits except for other than mentioned below  SKIN: no visible rashes or skin lesions  Pulmonary/Chest: Effort normal. No respiratory distress.     Ortho Exam  Shoulder exam:       RIGHT    Inspection Erythema None     Swelling None     Increased Warmth None    Rotator cuff ER 4/5     IR 5/5     Abduction 4/5    ROM      Abduction 100     ER0 40     IRb L5    TTP:  +anterior aspect/glenohumeral joint line, lateral aspect over greater tuberosity    Special Tests: O'Briens  (FF 90, add 10, resist thumbs up-, resist thumbs down+) Negative slap    Cross body Adduction Negative     Speeds  Positive     Yergason's Negative     Drop arm Negative     Neer Positive     Campos Positive        UE NV Exam: +2 Radial pulses   Sensation intact to light touch C5-T1 bilaterally    Right elbow, wrist, and and forearm ROM full, painless with passive ROM, no ttp or crepitance throughout extremities below shoulder joint          Procedures

## 2024-01-30 NOTE — PROGRESS NOTES
"1. Primary osteoarthritis of right shoulder  XR shoulder 2+ vw right      2. Chronic right shoulder pain  Ambulatory Referral to Sports Medicine    XR shoulder 2+ vw right      3. Rotator cuff disorder, right          Orders Placed This Encounter   Procedures    XR shoulder 2+ vw right        Imaging Studies (I personally reviewed images in PACS and report):    X-ray right shoulder 1/30/2024: No acute osseous abnormalities.  Mild AC joint arthritis.  Alignment unremarkable.  X-ray right shoulder 10/8/2019: [facility] [place]: There is only report available noting \"There is no evidence of acute fracture or subluxation within the right shoulder.  Mild degenerative changes right shoulder joint with small humeral head and glenoid osteophytes.  Mild degenerative changes acromioclavicular joint with small osteophytes   present.  Normal bone density. \"    IMPRESSION:  Chronic atraumatic right shoulder pain, limited range of motion and strength  Reportedly has been seen by [facility] and had imaging done in the past noting rotator cuff tear and was supposed to undergo surgical intervention but due to financial constraints he was not able to pursue in the past.  Reportedly has had no relief from conservative treatments including shoulder cortisone injections, formal physical therapy in the past  At this point, patient mainly interested in surgical intervention    Other factors:  CAD, Aortic regurgitation, TIA  GERD    PLAN:    Clinical exam and radiographic imaging reviewed with patient today, with impression as per above. I have discussed with the patient the pathophysiology of this diagnosis and reviewed how the examination correlates with this diagnosis.    Radiographic imaging of his right shoulder obtained today as noted above.  I counseled patient that I did not have access to the MRI reports of his right shoulder more information on the prior orthopedics he had seen in [place].    I counseled that if " "he has not improved reportedly to conservative treatments thus far, he could be indicated for surgical intervention.  I we will place a referral to Dr. Blue from orthopedic surgery to discuss surgical intervention.  I counseled patient in the interim to obtain a CD in regards to the MRI of his right shoulder for the surgeon to assess.  I will have my office reach out to the orthopedics in [place], [place] to add to his record today.    I advised against any shoulder cortisone injection today as this may delay surgical intervention and patient is agreeable as well.   In regards to pain control I counseled as needed use of acetaminophen, heat/ice therapy 20 minutes on/off.  I offered formal physical therapy referral today but patient deferred preferring to do home exercises.  Advised against oral NSAIDs or discussed with his PCP/cardiologist given his extensive cardiac disease history.    Return for Follow up with Dr. Blue from orthopedics.    Portions of the record may have been created with voice recognition software. Occasional wrong word or \"sound a like\" substitutions may have occurred due to the inherent limitations of voice recognition software. Read the chart carefully and recognize, using context, where substitutions have occurred.     CHIEF COMPLAINT:  Right shoulder pain    HPI:  Christopher Smalls Jr. is a 53 y.o. male  who presents in regards to referral from Dr. Davies for       Visit 1/30/2024:  Initial evaluation of bilateral shoulder pain, however right shoulder pain is much more severe and prominently addressed today during visit  Of note, patient recently moved here from [place] and is attempting to reestablish care.  He states he has a history of \"rotator cuff damage.\"  Reports he has been seen by orthopedics in [place] and did have an MRI of his shoulder in the past.  I do not have access to orthopedic reports or prior MRI imaging of his right shoulder.  The last imaging noted in his chart is an x-ray " "from 2019.    Patient reports has been an ongoing issue for his right shoulder for the past year possibly more.  Denies any precipitating injury.  Reports pain has been progressively getting worse over time.  He also reports progressive loss of range of motion, strength, function of his right upper extremity.  He states pain is over the anterior aspect of the shoulder and can radiate to the posterior aspect of his shoulder.  Describes it as \"drilling into the bone\" sharp/throbbing.  He states it can be aggravated randomly when leaning on his right arm and with any range of motion movements, lifting/pushing/pulling.  Reports clicking of his right shoulder with range of motion.  Denies any N/T of right upper extremity.  He reports he has been unable to reach above shoulder height.  He states that that he underwent cortisone injections of his shoulder, physical therapy in the past but did not provide any relief.  He states he was supposed to get right shoulder surgery but had to defer due to financial constraints.  He feels that he is prepared to undergo surgery going forward and would like to know more details.      Medical, Surgical, Family, and Social History    Past Medical History:   Diagnosis Date    Abnormal brain MRI     Aortic regurgitation     Aortic valve mass     Arthritis     Arthritis     Back pain     Bowel obstruction (HCC)     CAD in native artery     Coronary arteriosclerosis     Diabetes (HCC)     ED (erectile dysfunction)     Elevated PSA     GE reflux     Hand tingling     Headache     Heart attack (HCC)     Heart murmur     High cholesterol     Hypertension     Hypertension     Left leg pain     Low back pain     Lumbar radiculopathy     Mitral regurgitation     Mood disorder (HCC)     Myocardial infarct (HCC)     Osteoarthritis of wrist     Osteoporosis     Prediabetes     Prostate cancer (HCC)     Psychiatric disorder     Right shoulder pain     Sciatica     Spinal osteoarthritis     Sprain " "rotator cuff     Sprain, lumbar     Stress incontinence     TIA (transient ischemic attack)     Trigger ring finger of right hand     Urinary leakage     Weight disorder      Past Surgical History:   Procedure Laterality Date    APPENDECTOMY      CARDIAC CATHETERIZATION      CARDIAC SURGERY      CARDIOVASCULAR STRESS TEST      CHOLECYSTECTOMY      HERNIA REPAIR      OTHER SURGICAL HISTORY      Stent placement    PROSTATE SURGERY      Radical prostatectomy     Social History   Social History     Substance and Sexual Activity   Alcohol Use Not Currently     Social History     Substance and Sexual Activity   Drug Use Never     Social History     Tobacco Use   Smoking Status Former    Types: Cigarettes   Smokeless Tobacco Never     Family History   Problem Relation Age of Onset    Breast cancer Mother     Hypertension Mother     Heart disease Father     Hypertension Father     Prostate cancer Father     Rheum arthritis Father     Stroke Father     Heart disease Sister     Hypertension Sister     Other Sister         Resp. disease     No Known Allergies       Physical Exam  /84   Pulse 70   Temp 97.6 °F (36.4 °C) (Temporal)   Ht 5' 7\" (1.702 m)   Wt 93.4 kg (206 lb)   BMI 32.26 kg/m²     Constitutional:  see vital signs  Gen: well-developed, normocephalic/atraumatic, well-groomed  Eyes: No inflammation or discharge of conjunctiva or lids; sclera clear   Pharynx: no inflammation, lesion, or mass of lips  Neck: supple, no masses, non-distended  MSK: no inflammation, lesion, mass, or clubbing of nails and digits except for other than mentioned below  SKIN: no visible rashes or skin lesions  Pulmonary/Chest: Effort normal. No respiratory distress.     Ortho Exam  Shoulder exam:       RIGHT    Inspection Erythema None     Swelling None     Increased Warmth None    Rotator cuff ER 4/5     IR 5/5     Abduction 4/5    ROM      Abduction 100     ER0 40     IRb L5    TTP:  +anterior aspect/glenohumeral joint line, " lateral aspect over greater tuberosity    Special Tests: O'Briens  (FF 90, add 10, resist thumbs up-, resist thumbs down+) Negative slap    Cross body Adduction Negative     Speeds  Positive     Yergason's Negative     Drop arm Negative     Neer Positive     Campos Positive        UE NV Exam: +2 Radial pulses   Sensation intact to light touch C5-T1 bilaterally    Right elbow, wrist, and and forearm ROM full, painless with passive ROM, no ttp or crepitance throughout extremities below shoulder joint          Procedures

## 2024-02-05 ENCOUNTER — TELEPHONE (OUTPATIENT)
Age: 54
End: 2024-02-05

## 2024-02-05 NOTE — TELEPHONE ENCOUNTER
Patient called the RX Refill Line. Message is being forwarded to the office.     Patient is requesting a return call to make an appointment with Dr. Blue.    Please contact patient at  [phone]

## 2024-02-05 NOTE — TELEPHONE ENCOUNTER
Dr. Giordano asked me to call Patient Christopher to remind him to bring in his cd for Dr. Mirza White has appt with him @ 3:15 pm on Tuesday, 02/06/2023.

## 2024-02-06 ENCOUNTER — OFFICE VISIT (OUTPATIENT)
Age: 54
End: 2024-02-06
Payer: COMMERCIAL

## 2024-02-06 VITALS
HEART RATE: 80 BPM | WEIGHT: 212 LBS | HEIGHT: 67 IN | SYSTOLIC BLOOD PRESSURE: 130 MMHG | DIASTOLIC BLOOD PRESSURE: 78 MMHG | BODY MASS INDEX: 33.27 KG/M2

## 2024-02-06 DIAGNOSIS — S46.011A TRAUMATIC COMPLETE TEAR OF RIGHT ROTATOR CUFF, INITIAL ENCOUNTER: Primary | ICD-10-CM

## 2024-02-06 PROCEDURE — 99204 OFFICE O/P NEW MOD 45 MIN: CPT | Performed by: ORTHOPAEDIC SURGERY

## 2024-02-06 NOTE — PROGRESS NOTES
"CHIEF COMPLAIN/REASON FOR VISIT  Chief Complaint   Patient presents with    Right Shoulder - Pain       HISTORY OF PRESENT ILLNESS  Christopher Smalls Jr. is a RHD 53 y.o. male who presents for evaluation of their right shoulder. Patient said he continues to experience pain in the right shoulder. He said he has been having pain for about a year and a half. Patient feels the pain has continually worsened since the initial injury.  Patient denies any obvious injury to the shoulder when this for started.  Of note, patient said he works as a .    HPI from orthopedic sports medicine provider, Dr. Michael Giordano, on 1/30/24: \"Initial evaluation of bilateral shoulder pain, however right shoulder pain is much more severe and prominently addressed today during visit  Of note, patient recently moved here from [place] and is attempting to reestablish care.  He states he has a history of \"rotator cuff damage.\"  Reports he has been seen by orthopedics in [place] and did have an MRI of his shoulder in the past.  I do not have access to orthopedic reports or prior MRI imaging of his right shoulder.  The last imaging noted in his chart is an x-ray from 2019.     Patient reports has been an ongoing issue for his right shoulder for the past year possibly more.  Denies any precipitating injury.  Reports pain has been progressively getting worse over time.  He also reports progressive loss of range of motion, strength, function of his right upper extremity.  He states pain is over the anterior aspect of the shoulder and can radiate to the posterior aspect of his shoulder.  Describes it as \"drilling into the bone\" sharp/throbbing.  He states it can be aggravated randomly when leaning on his right arm and with any range of motion movements, lifting/pushing/pulling.  Reports clicking of his right shoulder with range of motion.  Denies any N/T of right upper extremity.  He reports he has been unable to reach above shoulder height.  He " "states that that he underwent cortisone injections of his shoulder, physical therapy in the past but did not provide any relief.  He states he was supposed to get right shoulder surgery but had to defer due to financial constraints.  He feels that he is prepared to undergo surgery going forward and would like to know more details.\"    REVIEW OF SYSTEMS  Review of systems was performed and, woutside that mentioned in the HPI, it was negative for symptomology related to the integumentary, hematologic, immunologic, allergic, neurologic, cardiovascular, respiratory, GI or  systems.     MEDICAL HISTORY  Patient Active Problem List   Diagnosis    Abnormal brain MRI    Aortic regurgitation    Aortic valve sclerosis    Mitral regurgitation    Aortic valve mass    Coronary artery disease of native artery of native heart with stable angina pectoris (HCC)    Essential hypertension    GE reflux    Mixed hyperlipidemia    History of tobacco use disorder    TIA (transient ischemic attack)    Lumbar radiculopathy    Prostate CA (HCC)    Prediabetes    Depression    Chronic pain of both shoulders    Chronic intractable headache    History of prostate cancer       SURGICAL HISTORY  Past Surgical History:   Procedure Laterality Date    APPENDECTOMY      CARDIAC CATHETERIZATION      CARDIAC SURGERY      CARDIOVASCULAR STRESS TEST      CHOLECYSTECTOMY      HERNIA REPAIR      OTHER SURGICAL HISTORY      Stent placement    PROSTATE SURGERY      Radical prostatectomy       CURRENT MEDICATIONS    Current Outpatient Medications:     amLODIPine (NORVASC) 5 mg tablet, Take 1 tablet (5 mg total) by mouth daily, Disp: 90 tablet, Rfl: 0    atorvastatin (LIPITOR) 80 mg tablet, Take 1 tablet (80 mg total) by mouth daily, Disp: 90 tablet, Rfl: 0    DULoxetine (Cymbalta) 60 mg delayed release capsule, Take 1 capsule (60 mg total) by mouth daily, Disp: 90 capsule, Rfl: 0    ezetimibe (Zetia) 10 mg tablet, Take 1 tablet (10 mg total) by mouth daily, " "Disp: 90 tablet, Rfl: 0    gabapentin (Neurontin) 300 mg capsule, Take 2 capsules (600 mg total) by mouth 3 (three) times a day For post-herpetic neuralgia: Take 1 tablet on day 1,  Then take 2 tablets on day 2, Then take 3 tablets on day 3 and every day after that as instructed by your doctor., Disp: 180 capsule, Rfl: 2    ibuprofen (MOTRIN) 800 mg tablet, Take 1 tablet (800 mg total) by mouth 3 (three) times a day, Disp: 21 tablet, Rfl: 0    metoprolol succinate (TOPROL-XL) 25 mg 24 hr tablet, Take 1 tablet (25 mg total) by mouth daily, Disp: 90 tablet, Rfl: 0    omeprazole (PriLOSEC) 20 mg delayed release capsule, Take 1 capsule (20 mg total) by mouth daily, Disp: 30 capsule, Rfl: 2    SUMAtriptan (Imitrex) 50 mg tablet, Take 1 tablet (50 mg total) by mouth daily as needed for migraine, Disp: 30 tablet, Rfl: 0    SOCIAL HISTORY  Social History     Socioeconomic History    Marital status: /Civil Union     Spouse name: Not on file    Number of children: Not on file    Years of education: Not on file    Highest education level: Not on file   Occupational History    Not on file   Tobacco Use    Smoking status: Former     Types: Cigarettes     Passive exposure: Past    Smokeless tobacco: Never   Vaping Use    Vaping status: Never Used   Substance and Sexual Activity    Alcohol use: Not Currently    Drug use: Never    Sexual activity: Not on file   Other Topics Concern    Not on file   Social History Narrative    Not on file     Social Determinants of Health     Financial Resource Strain: Not on file   Food Insecurity: Not on file   Transportation Needs: Not on file   Physical Activity: Not on file   Stress: Not on file   Social Connections: Not on file   Intimate Partner Violence: Not on file   Housing Stability: Not on file       Objective     VITAL SIGNS  /78   Pulse 80   Ht 5' 7\" (1.702 m)   Wt 96.2 kg (212 lb)   BMI 33.20 kg/m²      PHYSICAL EXAM  General:   Well-appearing  No acute " distress  Appears stated age    Right Shoulder  Shoulder effusion none present  Shoulder abduction 0-70  Shoulder forward flexion 0-70  Supraspinatus/ABD 3/5   Infraspinatus/ER 3/5   Subscapularis 1/5  Positive Belly Press/SS  Positive Neer  Positive Campos  Positive O'briens  Skin is intact with no erythema, warmth or drainage  Motor strength intact distally  Sensation to light touch is normal in the axillary, radial, ulnar, and median nerve distributions.  Fingers warm and perfused    RADIOGRAPHIC EXAMINATION/DIAGNOSTICS:  Procedure: XR shoulder 2+ vw right    Result Date: 1/30/2024  Narrative: RIGHT SHOULDER INDICATION:   Pain in right shoulder. Other chronic pain. Primary osteoarthritis, right shoulder. COMPARISON:  There are no previous examinations available for comparison. VIEWS:  XR SHOULDER 2+ VW RIGHT FINDINGS: There is no acute fracture or dislocation. No significant degenerative changes. No lytic or blastic osseous lesion. Soft tissues are unremarkable.     Impression: No acute osseous abnormality considering the patient's age Electronically signed: 01/30/2024 09:58 AM Santiago Huitron MD          ASSESSMENT/PLAN:  Right shoulder pain; full thickness rotator cuff tear    Christopher Smalls Jr. injured their shoulder acutely, resulting in a traumatic rotator cuff tear. We discussed the importance of the rotator cuff in the stability and strength of the shoulder and other overhead activities. We had an extensive discussion regarding the diagnosis and its natural history. We also discussed both non-operative and operative treatment strategies. In an active individual who is frequently using the upper extremity for work and other activities, I would recommend rotator cuff repair. he as continued to experience significant symptoms and dysfunction and is ready to proceed with surgery. I certainly understand and am in agreement.    Pending medical clearance, we will plan to proceed with shoulder arthroscopy with  rotator cuff debridement versus repair, possible biceps tenodesis, subacromial decompression,  and all other indicated procedures. We described the significant post-operative recovery, including a minimum of 6 to 9 months return to full strenuous upper extremity activity if everything goes well. We discussed the anticipated pre, intra, and postoperative course in great detail. Specifically, we discussed the recovery and rehabilitation protocol and time lines.There is certainly a risk of reinjury upon returning full strenuous activities.    We discussed risks of surgery, which include shoulder stiffness, infection, risk of reinjury and future arthritis.    Patient will schedule rotator cuff repair surgery. he will need a preoperative clearance/physical appointment and physical therapy.     Prior to the procedure we will also have patient repeat right shoulder MRI and f/u with telemed prior to surgical intervention. MRI order placed.        Scribe Attestation      I,:  Uziel Michael PA-C am acting as a scribe while in the presence of the attending physician.:       I,:  Randy Blue MD personally performed the services described in this documentation    as scribed in my presence.:

## 2024-02-14 ENCOUNTER — CONSULT (OUTPATIENT)
Dept: FAMILY MEDICINE CLINIC | Facility: CLINIC | Age: 54
End: 2024-02-14
Payer: COMMERCIAL

## 2024-02-14 ENCOUNTER — APPOINTMENT (OUTPATIENT)
Dept: LAB | Facility: CLINIC | Age: 54
End: 2024-02-14
Payer: COMMERCIAL

## 2024-02-14 ENCOUNTER — OFFICE VISIT (OUTPATIENT)
Dept: CARDIOLOGY CLINIC | Facility: CLINIC | Age: 54
End: 2024-02-14
Payer: COMMERCIAL

## 2024-02-14 VITALS
WEIGHT: 208.6 LBS | HEART RATE: 74 BPM | SYSTOLIC BLOOD PRESSURE: 144 MMHG | DIASTOLIC BLOOD PRESSURE: 90 MMHG | HEIGHT: 67 IN | BODY MASS INDEX: 32.74 KG/M2

## 2024-02-14 VITALS
HEART RATE: 88 BPM | BODY MASS INDEX: 33.49 KG/M2 | TEMPERATURE: 97.8 F | DIASTOLIC BLOOD PRESSURE: 75 MMHG | SYSTOLIC BLOOD PRESSURE: 130 MMHG | HEIGHT: 67 IN | WEIGHT: 213.41 LBS | OXYGEN SATURATION: 95 %

## 2024-02-14 DIAGNOSIS — Z11.4 SCREENING FOR HIV (HUMAN IMMUNODEFICIENCY VIRUS): ICD-10-CM

## 2024-02-14 DIAGNOSIS — Z01.810 PRE-OPERATIVE CARDIOVASCULAR EXAMINATION: Primary | ICD-10-CM

## 2024-02-14 DIAGNOSIS — Z01.818 PRE-OP EXAMINATION: Primary | ICD-10-CM

## 2024-02-14 DIAGNOSIS — S46.011D TRAUMATIC COMPLETE TEAR OF RIGHT ROTATOR CUFF, SUBSEQUENT ENCOUNTER: ICD-10-CM

## 2024-02-14 DIAGNOSIS — R06.09 DOE (DYSPNEA ON EXERTION): ICD-10-CM

## 2024-02-14 DIAGNOSIS — R07.9 CHEST PAIN, UNSPECIFIED TYPE: ICD-10-CM

## 2024-02-14 DIAGNOSIS — S46.011A TRAUMATIC COMPLETE TEAR OF RIGHT ROTATOR CUFF, INITIAL ENCOUNTER: ICD-10-CM

## 2024-02-14 DIAGNOSIS — Z11.59 NEED FOR HEPATITIS C SCREENING TEST: ICD-10-CM

## 2024-02-14 DIAGNOSIS — I25.118 CORONARY ARTERY DISEASE OF NATIVE ARTERY OF NATIVE HEART WITH STABLE ANGINA PECTORIS (HCC): ICD-10-CM

## 2024-02-14 DIAGNOSIS — R73.03 PREDIABETES: ICD-10-CM

## 2024-02-14 DIAGNOSIS — E78.2 MIXED HYPERLIPIDEMIA: ICD-10-CM

## 2024-02-14 DIAGNOSIS — I10 ESSENTIAL HYPERTENSION: ICD-10-CM

## 2024-02-14 LAB
ANION GAP SERPL CALCULATED.3IONS-SCNC: 10 MMOL/L
BASOPHILS # BLD AUTO: 0.06 THOUSANDS/ÂΜL (ref 0–0.1)
BASOPHILS NFR BLD AUTO: 1 % (ref 0–1)
BUN SERPL-MCNC: 13 MG/DL (ref 5–25)
CALCIUM SERPL-MCNC: 9.4 MG/DL (ref 8.4–10.2)
CHLORIDE SERPL-SCNC: 103 MMOL/L (ref 96–108)
CO2 SERPL-SCNC: 27 MMOL/L (ref 21–32)
CREAT SERPL-MCNC: 0.94 MG/DL (ref 0.6–1.3)
EOSINOPHIL # BLD AUTO: 0.15 THOUSAND/ÂΜL (ref 0–0.61)
EOSINOPHIL NFR BLD AUTO: 3 % (ref 0–6)
ERYTHROCYTE [DISTWIDTH] IN BLOOD BY AUTOMATED COUNT: 12.4 % (ref 11.6–15.1)
GFR SERPL CREATININE-BSD FRML MDRD: 91 ML/MIN/1.73SQ M
GLUCOSE P FAST SERPL-MCNC: 122 MG/DL (ref 65–99)
HCT VFR BLD AUTO: 41.1 % (ref 36.5–49.3)
HGB BLD-MCNC: 13.6 G/DL (ref 12–17)
IMM GRANULOCYTES # BLD AUTO: 0.01 THOUSAND/UL (ref 0–0.2)
IMM GRANULOCYTES NFR BLD AUTO: 0 % (ref 0–2)
LYMPHOCYTES # BLD AUTO: 2.11 THOUSANDS/ÂΜL (ref 0.6–4.47)
LYMPHOCYTES NFR BLD AUTO: 43 % (ref 14–44)
MCH RBC QN AUTO: 29.4 PG (ref 26.8–34.3)
MCHC RBC AUTO-ENTMCNC: 33.1 G/DL (ref 31.4–37.4)
MCV RBC AUTO: 89 FL (ref 82–98)
MONOCYTES # BLD AUTO: 0.45 THOUSAND/ÂΜL (ref 0.17–1.22)
MONOCYTES NFR BLD AUTO: 9 % (ref 4–12)
NEUTROPHILS # BLD AUTO: 2.15 THOUSANDS/ÂΜL (ref 1.85–7.62)
NEUTS SEG NFR BLD AUTO: 44 % (ref 43–75)
NRBC BLD AUTO-RTO: 0 /100 WBCS
PLATELET # BLD AUTO: 223 THOUSANDS/UL (ref 149–390)
PMV BLD AUTO: 11.5 FL (ref 8.9–12.7)
POTASSIUM SERPL-SCNC: 3.9 MMOL/L (ref 3.5–5.3)
RBC # BLD AUTO: 4.62 MILLION/UL (ref 3.88–5.62)
SODIUM SERPL-SCNC: 140 MMOL/L (ref 135–147)
WBC # BLD AUTO: 4.93 THOUSAND/UL (ref 4.31–10.16)

## 2024-02-14 PROCEDURE — 99244 OFF/OP CNSLTJ NEW/EST MOD 40: CPT | Performed by: FAMILY MEDICINE

## 2024-02-14 PROCEDURE — 99204 OFFICE O/P NEW MOD 45 MIN: CPT | Performed by: INTERNAL MEDICINE

## 2024-02-14 PROCEDURE — 36415 COLL VENOUS BLD VENIPUNCTURE: CPT

## 2024-02-14 PROCEDURE — 80048 BASIC METABOLIC PNL TOTAL CA: CPT

## 2024-02-14 PROCEDURE — 93000 ELECTROCARDIOGRAM COMPLETE: CPT | Performed by: INTERNAL MEDICINE

## 2024-02-14 PROCEDURE — 85025 COMPLETE CBC W/AUTO DIFF WBC: CPT

## 2024-02-14 NOTE — PROGRESS NOTES
"PRE-OPERATIVE EXAMINATION  Christopher Smalls Jr.  1970    Christopher Smalls Jr. is a 54 y.o. male with past medical history of CAD, HTN, prostate cancer, TIA, HLD, Prediabetes who is planning to undergo Right rotator cuff repair under general by Dr. Blue on 3/6/2024. The procedure is indicated for the following condition: complete rotator cuff tear, right. Patient has not had complications with anesthesia in the past.    ROS:   Chest pain: no   Shortness of breath: no  Shortness of breath with exertion: no  Orthopnea: no  Dizziness: no  Unexplained weight change: no    PMH:  CAD: yes  HTN: yes  CKD: no  DM: no on insulin: no  History of CVA: no    He  reports that he has quit smoking. His smoking use included cigarettes. He has been exposed to tobacco smoke. He has never used smokeless tobacco. He reports that he does not currently use alcohol. He reports that he does not use drugs.    /75 (BP Location: Right arm, Patient Position: Sitting, Cuff Size: Large)   Pulse 88   Temp 97.8 °F (36.6 °C) (Tympanic)   Ht 5' 7\" (1.702 m)   Wt 96.8 kg (213 lb 6.5 oz)   SpO2 95%   BMI 33.42 kg/m²   Physical Exam  Vitals reviewed.   Constitutional:       General: He is not in acute distress.     Appearance: He is obese. He is not ill-appearing.   HENT:      Head: Normocephalic and atraumatic.      Nose: Nose normal.   Eyes:      Extraocular Movements: Extraocular movements intact.      Conjunctiva/sclera: Conjunctivae normal.   Neck:      Vascular: No carotid bruit.   Cardiovascular:      Rate and Rhythm: Normal rate and regular rhythm.      Heart sounds: Murmur heard.   Pulmonary:      Effort: Pulmonary effort is normal.      Breath sounds: Normal breath sounds.   Abdominal:      General: Abdomen is flat.      Palpations: Abdomen is soft.   Musculoskeletal:      Cervical back: Neck supple.      Right lower leg: No edema.      Left lower leg: No edema.   Skin:     General: Skin is warm.   Neurological:      General: No " focal deficit present.      Mental Status: He is alert.   Psychiatric:         Mood and Affect: Mood normal.         Behavior: Behavior normal.         Revised Cardiac Risk Index (RCRI) for Pre-Operative Risk   (estimates risk of cardiac complications after noncardiac surgery)    High-risk surgery: No 0  Intraperitoneal, intrathoracic, suprainguinal vascular  History of ischemic heart disease: Yes +1  Hx of MI, (+) exercise test, current chest pain considered due to myocardial ischemia, use of nitrate therapy or ECG with pathological Q waves)  History of CHF: No 0  Pulmonary edema, B/L rales or S3 gallop; EVANS, orthopnea, PND, CXR showing pulmonary vascular redistribution)  History of cerebrovascular disease: Yes +1 (TIA, not CVA)  Prior TIA or stroke  Pre-operative treatment with insulin: No 0  Pre-operative creatinine >2 mg/dL: No 0    RCRI Scorin points: Class III Risk, 10.1% 30-day risk of death, MI, or cardiac arrest      Lab Results   Component Value Date    CREATININE 0.90 2024       Christopher was seen today for pre-op exam.    Diagnoses and all orders for this visit:    Pre-op examination    Traumatic complete tear of right rotator cuff, subsequent encounter      Updated CBC and BMP were ordered by his surgeon that he had drawn this morning and results are pending. I reviewed CBC and BMP from 2024 and GFR is 97 with Cr. 0.9.     Recommendations:  Christopher Smalls Jr. is undergoing an elective Moderate Risk surgery, right rotator cuff tear. He is RCRI class III risk (2 points for history of CAD, history of TIA) with 10.1% 30-day risk of death, MI, or cardiac arrest. He may proceed with surgery as planned without further workup. There was no pre-op form completed. Note will be electronically sent.

## 2024-02-14 NOTE — PROGRESS NOTES
Cardiology             Christopher Smalls .  1970  [id number]              Assessment/Plan:    CAD, prior NSTEMI in 2019 status post PCI/JOHN to obtuse marginal artery on 10/3/2019, PCI/JOHN to large septal branch on 10/4/2019  Hypertension  Dyslipidemia  Prior tobacco use      Patient states he had chest discomfort and shortness of breath in December and occasionally will feel a sense of dyspnea with overexertion but has been limiting his exercise and activity levels.  He has discontinued aspirin on his own, thinking he would not needed.  He is a poor historian.  At his has been almost 5 years since his PCI, I recommended a nuclear stress test for him which he is agreeable with.  Will also get an echocardiogram to reevaluate his left ventricular systolic function.  He will call me after completion of the studies to discuss results over the phone.  If unremarkable, he will be cleared for surgery.  Blood pressure mildly elevated, for now continue metoprolol.  Continue amlodipine.  Low threshold to add ACE inhibitor or ARB, considering LVH on ECG.  Will check echocardiogram for further evaluation.  Continue high-dose atorvastatin        He would like to follow up at [facility], closer to where he lives.  He will call to review results of stress test and echocardiogram.  His clearance will be completed after review of the studies.      Interval History:     This is a 54-year-old male with CAD status post non-STEMI in 2019 at which time he underwent PCI/JOHN to the obtuse marginal artery on 10/3/2019 and PCI/JOHN to a large septal branch 10/4/2019.  He has hypertension, dyslipidemia and prior history of tobacco use.  He has been seeing [facility] cardiology in the past, but now lives in [place].  He is scheduled for rotator cuff surgery on 3/6/2024 and presents today for preoperative cardiac clearance.    Stress echocardiogram 5/27/2021 was within normal limits with ejection fraction 50 to 55%.    Prior echocardiogram  "3/11/2020 at [facility] demonstrated left ejection fraction 55 to 60%    He states he went to the hospital 12/2024 at which time he had chest discomfort and shortness of breath.  His blood pressure was elevated at that time and he was admitted to the hospital.  Since then he has not been having the symptoms, although has limited his activity.  He does not formally exercise.  He has fortunately quit smoking.  He discontinues aspirin on his own at some point in the past, thinking he does not need it.                  Vitals:  Vitals:    02/14/24 1312   BP: 144/90   Pulse: 74   Weight: 94.6 kg (208 lb 9.6 oz)   Height: 5' 7\" (1.702 m)         Past Medical History:   Diagnosis Date   • Abnormal brain MRI    • Aortic regurgitation    • Aortic valve mass    • Arthritis    • Arthritis    • Back pain    • Bowel obstruction (HCC)    • CAD in native artery    • Coronary arteriosclerosis    • Diabetes (HCC)    • ED (erectile dysfunction)    • Elevated PSA    • GE reflux    • Hand tingling    • Headache    • Heart attack (HCC)    • Heart murmur    • High cholesterol    • Hypertension    • Hypertension    • Left leg pain    • Low back pain    • Lumbar radiculopathy    • Mitral regurgitation    • Mood disorder (HCC)    • Myocardial infarct (HCC)    • Osteoarthritis of wrist    • Osteoporosis    • Prediabetes    • Prostate cancer (HCC)    • Psychiatric disorder    • Right shoulder pain    • Sciatica    • Spinal osteoarthritis    • Sprain rotator cuff    • Sprain, lumbar    • Stress incontinence    • TIA (transient ischemic attack)    • Trigger ring finger of right hand    • Urinary leakage    • Weight disorder      Social History     Socioeconomic History   • Marital status: /Civil Union     Spouse name: Not on file   • Number of children: Not on file   • Years of education: Not on file   • Highest education level: Not on file   Occupational History   • Not on file   Tobacco Use   • Smoking status: Former     Types: Cigarettes    "  Passive exposure: Past   • Smokeless tobacco: Never   Vaping Use   • Vaping status: Never Used   Substance and Sexual Activity   • Alcohol use: Not Currently   • Drug use: Never   • Sexual activity: Not on file   Other Topics Concern   • Not on file   Social History Narrative   • Not on file     Social Determinants of Health     Financial Resource Strain: Not on file   Food Insecurity: Not on file   Transportation Needs: Not on file   Physical Activity: Not on file   Stress: Not on file   Social Connections: Not on file   Intimate Partner Violence: Not on file   Housing Stability: Not on file      Family History   Problem Relation Age of Onset   • Breast cancer Mother    • Hypertension Mother    • Heart disease Father    • Hypertension Father    • Prostate cancer Father    • Rheum arthritis Father    • Stroke Father    • Heart disease Sister    • Hypertension Sister    • Other Sister         Resp. disease     Past Surgical History:   Procedure Laterality Date   • APPENDECTOMY     • CARDIAC CATHETERIZATION     • CARDIAC SURGERY     • CARDIOVASCULAR STRESS TEST     • CHOLECYSTECTOMY     • HERNIA REPAIR     • OTHER SURGICAL HISTORY      Stent placement   • PROSTATE SURGERY      Radical prostatectomy       Current Outpatient Medications:   •  amLODIPine (NORVASC) 5 mg tablet, Take 1 tablet (5 mg total) by mouth daily, Disp: 90 tablet, Rfl: 0  •  atorvastatin (LIPITOR) 80 mg tablet, Take 1 tablet (80 mg total) by mouth daily, Disp: 90 tablet, Rfl: 0  •  DULoxetine (Cymbalta) 60 mg delayed release capsule, Take 1 capsule (60 mg total) by mouth daily, Disp: 90 capsule, Rfl: 0  •  ezetimibe (Zetia) 10 mg tablet, Take 1 tablet (10 mg total) by mouth daily, Disp: 90 tablet, Rfl: 0  •  gabapentin (Neurontin) 300 mg capsule, Take 2 capsules (600 mg total) by mouth 3 (three) times a day For post-herpetic neuralgia: Take 1 tablet on day 1,  Then take 2 tablets on day 2, Then take 3 tablets on day 3 and every day after that as  instructed by your doctor., Disp: 180 capsule, Rfl: 2  •  ibuprofen (MOTRIN) 800 mg tablet, Take 1 tablet (800 mg total) by mouth 3 (three) times a day, Disp: 21 tablet, Rfl: 0  •  metoprolol succinate (TOPROL-XL) 25 mg 24 hr tablet, Take 1 tablet (25 mg total) by mouth daily, Disp: 90 tablet, Rfl: 0  •  omeprazole (PriLOSEC) 20 mg delayed release capsule, Take 1 capsule (20 mg total) by mouth daily, Disp: 30 capsule, Rfl: 2  •  SUMAtriptan (Imitrex) 50 mg tablet, Take 1 tablet (50 mg total) by mouth daily as needed for migraine, Disp: 30 tablet, Rfl: 0        Review of Systems:  Review of Systems   Respiratory: Negative.     Cardiovascular: Negative.    All other systems reviewed and are negative.        Physical Exam:  Physical Exam  Constitutional:       General: He is not in acute distress.     Appearance: He is well-developed. He is not diaphoretic.   HENT:      Head: Normocephalic and atraumatic.   Eyes:      General: No scleral icterus.        Right eye: No discharge.      Pupils: Pupils are equal, round, and reactive to light.   Neck:      Thyroid: No thyromegaly.   Cardiovascular:      Rate and Rhythm: Normal rate and regular rhythm.      Heart sounds: Normal heart sounds. No murmur heard.     No friction rub. No gallop.   Pulmonary:      Effort: Pulmonary effort is normal.      Breath sounds: Normal breath sounds.   Abdominal:      General: There is no distension.      Tenderness: There is no abdominal tenderness. There is no guarding or rebound.   Musculoskeletal:         General: Normal range of motion.      Cervical back: Normal range of motion and neck supple.   Skin:     General: Skin is warm and dry.      Coloration: Skin is not pale.      Findings: No erythema or rash.   Neurological:      Mental Status: He is alert and oriented to person, place, and time.      Coordination: Coordination normal.      Gait: Gait abnormal.   Psychiatric:         Behavior: Behavior normal.         Thought Content:  Thought content normal.         Judgment: Judgment normal.         This note was completed in part utilizing M-Modal Fluency Direct Software.  Grammatical errors, random word insertions, spelling mistakes, and incomplete sentences can be an occasional consequence of this system secondary to software limitations, ambient noise, and hardware issues.  If you have any questions or concerns about the content, text, or information contained within the body of this dictation, please contact the provider for clarification.

## 2024-02-14 NOTE — LETTER
"February 14, 2024     Randy Blue MD  [address] [facility] [id number]    Patient: Christopher Smalls Jr.   YOB: 1970   Date of Visit: 2/14/2024       Dear Dr. Blue:    Thank you for referring Christopher Smalls to me for evaluation. Below are my notes for this consultation.    If you have questions, please do not hesitate to call me. I look forward to following your patient along with you.         Sincerely,        Edwina Davies DO        CC: No Recipients    Edwina Davies DO  2/14/2024  9:45 AM  Sign when Signing Visit  PRE-OPERATIVE EXAMINATION  Christopher Smalls Jr.  1970    Christopher Smalls Jr. is a 54 y.o. male with past medical history of CAD, HTN, prostate cancer, TIA, HLD, Prediabetes who is planning to undergo Right rotator cuff repair under general by Dr. Blue on 3/6/2024. The procedure is indicated for the following condition: complete rotator cuff tear, right. Patient has not had complications with anesthesia in the past.    ROS:   Chest pain: no   Shortness of breath: no  Shortness of breath with exertion: no  Orthopnea: no  Dizziness: no  Unexplained weight change: no    PMH:  CAD: yes  HTN: yes  CKD: no  DM: no on insulin: no  History of CVA: no    He  reports that he has quit smoking. His smoking use included cigarettes. He has been exposed to tobacco smoke. He has never used smokeless tobacco. He reports that he does not currently use alcohol. He reports that he does not use drugs.    /75 (BP Location: Right arm, Patient Position: Sitting, Cuff Size: Large)   Pulse 88   Temp 97.8 °F (36.6 °C) (Tympanic)   Ht 5' 7\" (1.702 m)   Wt 96.8 kg (213 lb 6.5 oz)   SpO2 95%   BMI 33.42 kg/m²   Physical Exam  Vitals reviewed.   Constitutional:       General: He is not in acute distress.     Appearance: He is obese. He is not ill-appearing.   HENT:      Head: Normocephalic and atraumatic.      Nose: Nose normal.   Eyes:      Extraocular Movements: Extraocular movements " intact.      Conjunctiva/sclera: Conjunctivae normal.   Neck:      Vascular: No carotid bruit.   Cardiovascular:      Rate and Rhythm: Normal rate and regular rhythm.      Heart sounds: Murmur heard.   Pulmonary:      Effort: Pulmonary effort is normal.      Breath sounds: Normal breath sounds.   Abdominal:      General: Abdomen is flat.      Palpations: Abdomen is soft.   Musculoskeletal:      Cervical back: Neck supple.      Right lower leg: No edema.      Left lower leg: No edema.   Skin:     General: Skin is warm.   Neurological:      General: No focal deficit present.      Mental Status: He is alert.   Psychiatric:         Mood and Affect: Mood normal.         Behavior: Behavior normal.         Revised Cardiac Risk Index (RCRI) for Pre-Operative Risk   (estimates risk of cardiac complications after noncardiac surgery)    High-risk surgery: No 0  Intraperitoneal, intrathoracic, suprainguinal vascular  History of ischemic heart disease: Yes +1  Hx of MI, (+) exercise test, current chest pain considered due to myocardial ischemia, use of nitrate therapy or ECG with pathological Q waves)  History of CHF: No 0  Pulmonary edema, B/L rales or S3 gallop; EVANS, orthopnea, PND, CXR showing pulmonary vascular redistribution)  History of cerebrovascular disease: Yes +1 (TIA, not CVA)  Prior TIA or stroke  Pre-operative treatment with insulin: No 0  Pre-operative creatinine >2 mg/dL: No 0    RCRI Scorin points: Class III Risk, 10.1% 30-day risk of death, MI, or cardiac arrest      Lab Results   Component Value Date    CREATININE 0.90 2024       Christopher was seen today for pre-op exam.    Diagnoses and all orders for this visit:    Pre-op examination    Traumatic complete tear of right rotator cuff, subsequent encounter      Updated CBC and BMP were ordered by his surgeon that he had drawn this morning and results are pending. I reviewed CBC and BMP from 2024 and GFR is 97 with Cr. 0.9.      Recommendations:  Christopher Smalls Jr. is undergoing an elective Moderate Risk surgery, right rotator cuff tear. He is RCRI class III risk (2 points for history of CAD, history of TIA) with 10.1% 30-day risk of death, MI, or cardiac arrest. He may proceed with surgery as planned without further workup. There was no pre-op form completed. Note will be electronically sent.

## 2024-02-15 ENCOUNTER — HOSPITAL ENCOUNTER (OUTPATIENT)
Dept: MRI IMAGING | Facility: HOSPITAL | Age: 54
End: 2024-02-15
Payer: COMMERCIAL

## 2024-02-15 DIAGNOSIS — S46.011A TRAUMATIC COMPLETE TEAR OF RIGHT ROTATOR CUFF, INITIAL ENCOUNTER: ICD-10-CM

## 2024-02-15 PROCEDURE — 73221 MRI JOINT UPR EXTREM W/O DYE: CPT

## 2024-02-15 PROCEDURE — G1004 CDSM NDSC: HCPCS

## 2024-02-26 ENCOUNTER — HOSPITAL ENCOUNTER (OUTPATIENT)
Dept: RADIOLOGY | Facility: CLINIC | Age: 54
Discharge: HOME | End: 2024-02-26
Payer: COMMERCIAL

## 2024-02-26 ENCOUNTER — TELEPHONE (OUTPATIENT)
Age: 54
End: 2024-02-26

## 2024-02-26 ENCOUNTER — CONSULT (OUTPATIENT)
Dept: PAIN MEDICINE | Facility: CLINIC | Age: 54
End: 2024-02-26
Payer: COMMERCIAL

## 2024-02-26 VITALS
RESPIRATION RATE: 20 BRPM | HEIGHT: 67 IN | SYSTOLIC BLOOD PRESSURE: 142 MMHG | TEMPERATURE: 97.8 F | HEART RATE: 76 BPM | OXYGEN SATURATION: 96 % | WEIGHT: 212 LBS | BODY MASS INDEX: 33.27 KG/M2 | DIASTOLIC BLOOD PRESSURE: 98 MMHG

## 2024-02-26 DIAGNOSIS — M54.16 LUMBAR RADICULOPATHY: ICD-10-CM

## 2024-02-26 DIAGNOSIS — M25.551 BILATERAL HIP PAIN: Primary | ICD-10-CM

## 2024-02-26 DIAGNOSIS — M25.552 BILATERAL HIP PAIN: Primary | ICD-10-CM

## 2024-02-26 PROCEDURE — 72100 X-RAY EXAM L-S SPINE 2/3 VWS: CPT

## 2024-02-26 PROCEDURE — 99244 OFF/OP CNSLTJ NEW/EST MOD 40: CPT | Performed by: ANESTHESIOLOGY

## 2024-02-26 PROCEDURE — 73502 X-RAY EXAM HIP UNI 2-3 VIEWS: CPT

## 2024-02-26 RX ORDER — PREGABALIN 75 MG/1
75 CAPSULE ORAL 3 TIMES DAILY
Qty: 90 CAPSULE | Refills: 2 | Status: SHIPPED | OUTPATIENT
Start: 2024-02-26

## 2024-02-26 NOTE — LETTER
February 29, 2024     Edwina Davies, DO  [address] [facility] [address]    Patient: Christopher Smalls Jr.   YOB: 1970   Date of Visit: 2/26/2024       Dear Dr. Davies:    Thank you for referring Christopher Smalls to me for evaluation. Below are my notes for this consultation.    If you have questions, please do not hesitate to call me. I look forward to following your patient along with you.         Sincerely,        Kunal Maurer MD        CC: No Recipients    Kunal Maurer MD  2/26/2024 11:01 AM  Signed      Assessment  1. Lumbar radiculopathy - Right  -     Ambulatory referral to Spine & Pain Management  -     Ambulatory referral to Physical Therapy; Future  -     pregabalin (LYRICA) 75 mg capsule; Take 1 capsule (75 mg total) by mouth 3 (three) times a day  -     X-ray lumbar spine 2 or 3 views; Future; Expected date: 02/26/2024  -     XR hip/pelv 2-3 vws right if performed; Future; Expected date: 02/26/2024    Right sided lumbar radicular pain in the L4 dermatomal distribution accompanied by pain limited weakness numbness and paresthesias.  Patient has not yet participated with PT. Chronic pain with decreased participation with IADLs over the past 3 months.  Has been taking OTC ibuprofen and tylenol in addition to gabapentin with modest benefit.  5/5 strength bilaterally, positive SLR right sided. Reflexes 2+.  Additionally there is positive facet loading,  right greater than left Denies any gait instability, saddle anesthesia. No imaging to review.  Risks, benefits alternatives epidural steroid injections thoroughly discussed with patient.  Handouts provided questions answered to patient's satisfaction.  Lifestyle modifications extensively discussed including diet, exercise and weight loss in addition to core strengthening.      Right sided hip pain described primarily by arthritic features.  Aching, nagging, indolent, stabbing, throbbing features in left hip radiating to left  groin. Pain with internal and external rotation of right hip, ttp over rightGTB. No significant imaging to review at this time. Risks, benefits and alternatives to left hip intra-articular joint injection thoroughly discussed with patient.  Handouts provided questions answered to patient satisfaction.    Plan  -f/u 4 weeks to consider MRI lumbar spine  -xray right hip, lumbar spine; will f/u result with patient  -gabapentin transitioned to lyrica 75 mg t.i.d. Ordered for patient; counseled regarding sedative effects of taking this medication and provided up titration calendar.  Counseled not to take medication while driving or operating heavy machinery/using stairs  -script provided for formal physical therapy for right-sided lumbar radiculopathy; Physician directed home exercise plan as per AAOS demonstrated and handouts provided that patient plans to participate with for 1 hour, twice a week for the next 6 weeks.     There are risks associated with opioid medications, including dependence, addiction and tolerance. The patient understands and agrees to use these medications only as prescribed. Potential side effects of the medications include, but are not limited to, constipation, drowsiness, addiction, impaired judgment and risk of fatal overdose if not taken as prescribed. The patient was warned against driving while taking sedation medications or operating heavy machinery. The patient voiced understanding. Sharing medications is a felony. At this point in time, the patient is showing no signs of addiction, abuse, diversion or suicidal ideation.     [place] Prescription Drug Monitoring Program report was reviewed and was appropriate      Complete risks and benefits including bleeding, infection, tissue reaction, nerve injury and allergic reaction were discussed. The approach was demonstrated using models and literature was provided. Verbal and written consent was obtained.     My impressions and treatment  recommendations were discussed in detail with the patient who verbalized understanding and had no further questions.  Discharge instructions were provided. I personally saw and examined the patient and I agree with the above discussed plan of care.    New Medications Ordered This Visit   Medications   • pregabalin (LYRICA) 75 mg capsule     Sig: Take 1 capsule (75 mg total) by mouth 3 (three) times a day     Dispense:  90 capsule     Refill:  2       History of Present Illness    Christopher Smalls Jr. is a 54 y.o. male with pmhx of HTN, XOL, migraines presenting with chronic lumbar radicular pain in the L4 dermatomal distributions. Debilitating pain limited weakness numbness and paresthesias accompany the pain. The patient rates the pain at a 8/10 accompanied by electric shock-like shooting features and crampy burning pain in the aforementioned dermatomal distributions.  The pain is worse in the mornings as well as the end of the day; exertion such as walking for long periods of time seems to exacerbate the pain. He tries to maintain an active lifestyle and finds that the current degree of pain seems to compromises his efforts.  The pain significantly impacts independent activities of daily living and contributes to significant disability.  He has not yet attempted PT this year.  He has taken naproxen, tylenol as well as gabapentin with limited relief of the pain as well.  He has never tried epidural steroid injections in the past. He denies any bowel or bladder dysfunction/incontinence, saddle anesthesia or gait instability.    Right hip pain described primarily as arthritic in nature. 8/10 right hip pain that is worse in the mornings and worse at the end of the day.  The pain is characterized by achy, nagging, indolent, crampy, stabbing pain in right hip.  The patient describes that the pain is worse with standing for long periods of time on hard surfaces as well as with walking.  The patient is a very active  individual and feels as though this pain compromises his participation with independent activities of daily living. The pain can be debilitating at times and contribute to significant disability, compromising overall activity and independent activities of daily living.     I have personally reviewed and/or updated the patient's past medical history, past surgical history, family history, social history, current medications, allergies, and vital signs today.     Review of Systems   Constitutional:  Positive for activity change.   HENT: Negative.     Eyes: Negative.    Respiratory: Negative.     Cardiovascular: Negative.    Gastrointestinal: Negative.    Endocrine: Negative.    Genitourinary: Negative.    Musculoskeletal:  Positive for arthralgias, back pain, gait problem and myalgias.   Skin: Negative.    Allergic/Immunologic: Negative.    Neurological:  Positive for weakness and numbness.   Hematological: Negative.    Psychiatric/Behavioral: Negative.     All other systems reviewed and are negative.      Patient Active Problem List   Diagnosis   • Abnormal brain MRI   • Aortic regurgitation   • Aortic valve sclerosis   • Mitral regurgitation   • Aortic valve mass   • Coronary artery disease of native artery of native heart with stable angina pectoris ([facility])   • Essential hypertension   • GE reflux   • Mixed hyperlipidemia   • History of tobacco use disorder   • TIA (transient ischemic attack)   • Lumbar radiculopathy   • Prostate CA ([facility])   • Prediabetes   • Depression   • Chronic pain of both shoulders   • Chronic intractable headache   • History of prostate cancer       Past Medical History:   Diagnosis Date   • Abnormal brain MRI    • Aortic regurgitation    • Aortic valve mass    • Arthritis    • Arthritis    • Back pain    • Bowel obstruction ([facility])    • CAD in native artery    • Coronary arteriosclerosis    • Diabetes ([facility])    • ED (erectile dysfunction)    • Elevated PSA    • GE reflux    • Hand tingling    •  Headache    • Heart attack (HCC)    • Heart murmur    • High cholesterol    • Hypertension    • Hypertension    • Left leg pain    • Low back pain    • Lumbar radiculopathy    • Mitral regurgitation    • Mood disorder (HCC)    • Myocardial infarct (HCC)    • Osteoarthritis of wrist    • Osteoporosis    • Prediabetes    • Prostate cancer (HCC)    • Psychiatric disorder    • Right shoulder pain    • Sciatica    • Spinal osteoarthritis    • Sprain rotator cuff    • Sprain, lumbar    • Stress incontinence    • TIA (transient ischemic attack)    • Trigger ring finger of right hand    • Urinary leakage    • Weight disorder        Past Surgical History:   Procedure Laterality Date   • APPENDECTOMY     • CARDIAC CATHETERIZATION     • CARDIAC SURGERY     • CARDIOVASCULAR STRESS TEST     • CHOLECYSTECTOMY     • HERNIA REPAIR     • OTHER SURGICAL HISTORY      Stent placement   • PROSTATE SURGERY      Radical prostatectomy       Family History   Problem Relation Age of Onset   • Breast cancer Mother    • Hypertension Mother    • Heart disease Father    • Hypertension Father    • Prostate cancer Father    • Rheum arthritis Father    • Stroke Father    • Heart disease Sister    • Hypertension Sister    • Other Sister         Resp. disease       Social History     Occupational History   • Not on file   Tobacco Use   • Smoking status: Former     Types: Cigarettes     Passive exposure: Past   • Smokeless tobacco: Never   Vaping Use   • Vaping status: Never Used   Substance and Sexual Activity   • Alcohol use: Not Currently   • Drug use: Never   • Sexual activity: Not on file       Current Outpatient Medications on File Prior to Visit   Medication Sig   • amLODIPine (NORVASC) 5 mg tablet Take 1 tablet (5 mg total) by mouth daily   • atorvastatin (LIPITOR) 80 mg tablet Take 1 tablet (80 mg total) by mouth daily   • DULoxetine (Cymbalta) 60 mg delayed release capsule Take 1 capsule (60 mg total) by mouth daily   • ezetimibe (Zetia) 10  "mg tablet Take 1 tablet (10 mg total) by mouth daily   • ibuprofen (MOTRIN) 800 mg tablet Take 1 tablet (800 mg total) by mouth 3 (three) times a day   • metoprolol succinate (TOPROL-XL) 25 mg 24 hr tablet Take 1 tablet (25 mg total) by mouth daily   • omeprazole (PriLOSEC) 20 mg delayed release capsule Take 1 capsule (20 mg total) by mouth daily   • SUMAtriptan (Imitrex) 50 mg tablet Take 1 tablet (50 mg total) by mouth daily as needed for migraine   • [DISCONTINUED] gabapentin (Neurontin) 300 mg capsule Take 2 capsules (600 mg total) by mouth 3 (three) times a day For post-herpetic neuralgia: Take 1 tablet on day 1,  Then take 2 tablets on day 2, Then take 3 tablets on day 3 and every day after that as instructed by your doctor.     No current facility-administered medications on file prior to visit.       No Known Allergies      Physical Exam    /98 (BP Location: Left arm, Patient Position: Sitting, Cuff Size: Adult)   Pulse 76   Temp 97.8 °F (36.6 °C)   Resp 20   Ht 5' 7\" (1.702 m)   Wt 96.2 kg (212 lb)   SpO2 96%   BMI 33.20 kg/m²     Constitutional: normal, well developed, well nourished, alert, in no distress and non-toxic and no overt pain behavior. and obese  Eyes: anicteric  HEENT: grossly intact  Neck: supple, symmetric, trachea midline and no masses   Pulmonary:even and unlabored  Cardiovascular:No edema or pitting edema present  Skin:Normal without rashes or lesions and well hydrated  Psychiatric:Mood and affect appropriate  Neurologic:Cranial Nerves II-XII grossly intact Sensation grossly intact; no clonus negative amezcua's. Reflexes 2+ and brisk. SLR negative bilaterally.   Musculoskeletal:normal gait. 5/5 strength bilaterally with AROM in lower extremities. Dififculty with normal heel toe and tip toe walking. Significant pain with lumbar facet loading bilaterally and with lateral spine rotation. ttp over lumbar paraspinal muscles. Negative emili's test, negative gaenslen's negative SIJ " loading bilaterally. Ttp over right gtb, pain with internal and external rotation of right hip    Imaging    No pertinent imaging available to review at this time.

## 2024-02-26 NOTE — TELEPHONE ENCOUNTER
Kunal Maurer MD  [facility]  No significant hip OA on xrays; Please relay above xray findings to patient; should f/u after completion of PT to guide interventional treatment options, thanks

## 2024-02-26 NOTE — PROGRESS NOTES
Assessment  1. Lumbar radiculopathy - Right  -     Ambulatory referral to Spine & Pain Management  -     Ambulatory referral to Physical Therapy; Future  -     pregabalin (LYRICA) 75 mg capsule; Take 1 capsule (75 mg total) by mouth 3 (three) times a day  -     X-ray lumbar spine 2 or 3 views; Future; Expected date: 02/26/2024  -     XR hip/pelv 2-3 vws right if performed; Future; Expected date: 02/26/2024    Right sided lumbar radicular pain in the L4 dermatomal distribution accompanied by pain limited weakness numbness and paresthesias.  Patient has not yet participated with PT. Chronic pain with decreased participation with IADLs over the past 3 months.  Has been taking OTC ibuprofen and tylenol in addition to gabapentin with modest benefit.  5/5 strength bilaterally, positive SLR right sided. Reflexes 2+.  Additionally there is positive facet loading,  right greater than left Denies any gait instability, saddle anesthesia. No imaging to review.  Risks, benefits alternatives epidural steroid injections thoroughly discussed with patient.  Handouts provided questions answered to patient's satisfaction.  Lifestyle modifications extensively discussed including diet, exercise and weight loss in addition to core strengthening.      Right sided hip pain described primarily by arthritic features.  Aching, nagging, indolent, stabbing, throbbing features in left hip radiating to left groin. Pain with internal and external rotation of right hip, ttp over rightGTB. No significant imaging to review at this time. Risks, benefits and alternatives to left hip intra-articular joint injection thoroughly discussed with patient.  Handouts provided questions answered to patient satisfaction.    Plan  -f/u 4 weeks to consider MRI lumbar spine  -xray right hip, lumbar spine; will f/u result with patient  -gabapentin transitioned to lyrica 75 mg t.i.d. Ordered for patient; counseled regarding sedative effects of taking this  medication and provided up titration calendar.  Counseled not to take medication while driving or operating heavy machinery/using stairs  -script provided for formal physical therapy for right-sided lumbar radiculopathy; Physician directed home exercise plan as per AAOS demonstrated and handouts provided that patient plans to participate with for 1 hour, twice a week for the next 6 weeks.     There are risks associated with opioid medications, including dependence, addiction and tolerance. The patient understands and agrees to use these medications only as prescribed. Potential side effects of the medications include, but are not limited to, constipation, drowsiness, addiction, impaired judgment and risk of fatal overdose if not taken as prescribed. The patient was warned against driving while taking sedation medications or operating heavy machinery. The patient voiced understanding. Sharing medications is a felony. At this point in time, the patient is showing no signs of addiction, abuse, diversion or suicidal ideation.     [place] Prescription Drug Monitoring Program report was reviewed and was appropriate      Complete risks and benefits including bleeding, infection, tissue reaction, nerve injury and allergic reaction were discussed. The approach was demonstrated using models and literature was provided. Verbal and written consent was obtained.     My impressions and treatment recommendations were discussed in detail with the patient who verbalized understanding and had no further questions.  Discharge instructions were provided. I personally saw and examined the patient and I agree with the above discussed plan of care.    New Medications Ordered This Visit   Medications    pregabalin (LYRICA) 75 mg capsule     Sig: Take 1 capsule (75 mg total) by mouth 3 (three) times a day     Dispense:  90 capsule     Refill:  2       History of Present Illness    Christopher Calvojessica Casper is a 54 y.o. male with pmhx of HTN,  XOL, migraines presenting with chronic lumbar radicular pain in the L4 dermatomal distributions. Debilitating pain limited weakness numbness and paresthesias accompany the pain. The patient rates the pain at a 8/10 accompanied by electric shock-like shooting features and crampy burning pain in the aforementioned dermatomal distributions.  The pain is worse in the mornings as well as the end of the day; exertion such as walking for long periods of time seems to exacerbate the pain. He tries to maintain an active lifestyle and finds that the current degree of pain seems to compromises his efforts.  The pain significantly impacts independent activities of daily living and contributes to significant disability.  He has not yet attempted PT this year.  He has taken naproxen, tylenol as well as gabapentin with limited relief of the pain as well.  He has never tried epidural steroid injections in the past. He denies any bowel or bladder dysfunction/incontinence, saddle anesthesia or gait instability.    Right hip pain described primarily as arthritic in nature. 8/10 right hip pain that is worse in the mornings and worse at the end of the day.  The pain is characterized by achy, nagging, indolent, crampy, stabbing pain in right hip.  The patient describes that the pain is worse with standing for long periods of time on hard surfaces as well as with walking.  The patient is a very active individual and feels as though this pain compromises his participation with independent activities of daily living. The pain can be debilitating at times and contribute to significant disability, compromising overall activity and independent activities of daily living.     I have personally reviewed and/or updated the patient's past medical history, past surgical history, family history, social history, current medications, allergies, and vital signs today.     Review of Systems   Constitutional:  Positive for activity change.   HENT:  Negative.     Eyes: Negative.    Respiratory: Negative.     Cardiovascular: Negative.    Gastrointestinal: Negative.    Endocrine: Negative.    Genitourinary: Negative.    Musculoskeletal:  Positive for arthralgias, back pain, gait problem and myalgias.   Skin: Negative.    Allergic/Immunologic: Negative.    Neurological:  Positive for weakness and numbness.   Hematological: Negative.    Psychiatric/Behavioral: Negative.     All other systems reviewed and are negative.      Patient Active Problem List   Diagnosis    Abnormal brain MRI    Aortic regurgitation    Aortic valve sclerosis    Mitral regurgitation    Aortic valve mass    Coronary artery disease of native artery of native heart with stable angina pectoris (HCC)    Essential hypertension    GE reflux    Mixed hyperlipidemia    History of tobacco use disorder    TIA (transient ischemic attack)    Lumbar radiculopathy    Prostate CA (HCC)    Prediabetes    Depression    Chronic pain of both shoulders    Chronic intractable headache    History of prostate cancer       Past Medical History:   Diagnosis Date    Abnormal brain MRI     Aortic regurgitation     Aortic valve mass     Arthritis     Arthritis     Back pain     Bowel obstruction (HCC)     CAD in native artery     Coronary arteriosclerosis     Diabetes (HCC)     ED (erectile dysfunction)     Elevated PSA     GE reflux     Hand tingling     Headache     Heart attack (HCC)     Heart murmur     High cholesterol     Hypertension     Hypertension     Left leg pain     Low back pain     Lumbar radiculopathy     Mitral regurgitation     Mood disorder (HCC)     Myocardial infarct (HCC)     Osteoarthritis of wrist     Osteoporosis     Prediabetes     Prostate cancer (HCC)     Psychiatric disorder     Right shoulder pain     Sciatica     Spinal osteoarthritis     Sprain rotator cuff     Sprain, lumbar     Stress incontinence     TIA (transient ischemic attack)     Trigger ring finger of right hand     Urinary  leakage     Weight disorder        Past Surgical History:   Procedure Laterality Date    APPENDECTOMY      CARDIAC CATHETERIZATION      CARDIAC SURGERY      CARDIOVASCULAR STRESS TEST      CHOLECYSTECTOMY      HERNIA REPAIR      OTHER SURGICAL HISTORY      Stent placement    PROSTATE SURGERY      Radical prostatectomy       Family History   Problem Relation Age of Onset    Breast cancer Mother     Hypertension Mother     Heart disease Father     Hypertension Father     Prostate cancer Father     Rheum arthritis Father     Stroke Father     Heart disease Sister     Hypertension Sister     Other Sister         Resp. disease       Social History     Occupational History    Not on file   Tobacco Use    Smoking status: Former     Types: Cigarettes     Passive exposure: Past    Smokeless tobacco: Never   Vaping Use    Vaping status: Never Used   Substance and Sexual Activity    Alcohol use: Not Currently    Drug use: Never    Sexual activity: Not on file       Current Outpatient Medications on File Prior to Visit   Medication Sig    amLODIPine (NORVASC) 5 mg tablet Take 1 tablet (5 mg total) by mouth daily    atorvastatin (LIPITOR) 80 mg tablet Take 1 tablet (80 mg total) by mouth daily    DULoxetine (Cymbalta) 60 mg delayed release capsule Take 1 capsule (60 mg total) by mouth daily    ezetimibe (Zetia) 10 mg tablet Take 1 tablet (10 mg total) by mouth daily    ibuprofen (MOTRIN) 800 mg tablet Take 1 tablet (800 mg total) by mouth 3 (three) times a day    metoprolol succinate (TOPROL-XL) 25 mg 24 hr tablet Take 1 tablet (25 mg total) by mouth daily    omeprazole (PriLOSEC) 20 mg delayed release capsule Take 1 capsule (20 mg total) by mouth daily    SUMAtriptan (Imitrex) 50 mg tablet Take 1 tablet (50 mg total) by mouth daily as needed for migraine    [DISCONTINUED] gabapentin (Neurontin) 300 mg capsule Take 2 capsules (600 mg total) by mouth 3 (three) times a day For post-herpetic neuralgia: Take 1 tablet on day 1,  Then  "take 2 tablets on day 2, Then take 3 tablets on day 3 and every day after that as instructed by your doctor.     No current facility-administered medications on file prior to visit.       No Known Allergies      Physical Exam    /98 (BP Location: Left arm, Patient Position: Sitting, Cuff Size: Adult)   Pulse 76   Temp 97.8 °F (36.6 °C)   Resp 20   Ht 5' 7\" (1.702 m)   Wt 96.2 kg (212 lb)   SpO2 96%   BMI 33.20 kg/m²     Constitutional: normal, well developed, well nourished, alert, in no distress and non-toxic and no overt pain behavior. and obese  Eyes: anicteric  HEENT: grossly intact  Neck: supple, symmetric, trachea midline and no masses   Pulmonary:even and unlabored  Cardiovascular:No edema or pitting edema present  Skin:Normal without rashes or lesions and well hydrated  Psychiatric:Mood and affect appropriate  Neurologic:Cranial Nerves II-XII grossly intact Sensation grossly intact; no clonus negative amezcua's. Reflexes 2+ and brisk. SLR negative bilaterally.   Musculoskeletal:normal gait. 5/5 strength bilaterally with AROM in lower extremities. Dififculty with normal heel toe and tip toe walking. Significant pain with lumbar facet loading bilaterally and with lateral spine rotation. ttp over lumbar paraspinal muscles. Negative emili's test, negative gaenslen's negative SIJ loading bilaterally. Ttp over right gtb, pain with internal and external rotation of right hip    Imaging    No pertinent imaging available to review at this time.       "

## 2024-02-26 NOTE — TELEPHONE ENCOUNTER
X-ray lumbar spine 2 or 3 views   MD CHARLEEN [facility]  Mild to moderate multilevel discogenic degenerative disease without focal high-grade abnormality on xray; please relay to patient, thanks; should continue PT to obtain new mRI of lumbar spine

## 2024-02-26 NOTE — PATIENT INSTRUCTIONS
Pregabalin (By mouth)   Pregabalin (pre-GA-ba-teofilo)  Treats nerve and muscle pain, including fibromyalgia. Also treats partial-onset seizures.   Brand Name(s): Lyrica, Lyrica CR   There may be other brand names for this medicine.  When This Medicine Should Not Be Used:   This medicine is not right for everyone. Do not use it if you had an allergic reaction to pregabalin.  How to Use This Medicine:   Capsule, Liquid, Long Acting Tablet  Take your medicine as directed. Your dose may need to be changed several times to find what works best for you.  Extended-release tablet: Swallow the extended-release tablet whole. Do not crush, break, or chew it. Take it after an evening meal.  Oral liquid: Measure the oral liquid medicine with a marked measuring spoon, oral syringe, or medicine cup.  This medicine should come with a Medication Guide. Ask your pharmacist for a copy if you do not have one.  Missed dose: Take a dose as soon as you remember. If it is almost time for your next dose, wait until then and take a regular dose. Do not take extra medicine to make up for a missed dose. If you miss a dose of the extended-release tablet after your evening meal, take it before bedtime after a snack. If you miss the dose before bedtime, take it after your morning meal. If you do not take the dose the following morning, then take the next dose at your regular time after your evening meal. Do not take 2 doses at the same time.  Store the medicine in a closed container at room temperature, away from heat, moisture, and direct light.  Drugs and Foods to Avoid:   Ask your doctor or pharmacist before using any other medicine, including over-the-counter medicines, vitamins, and herbal products.  Some medicines can affect how pregabalin works. Tell your doctor if you are using any of the following:   ACE inhibitor (including benazepril, enalapril, lisinopril, quinapril, ramipril)  Oral diabetes medicine (including metformin, pioglitazone,  rosiglitazone)  Do not drink alcohol while you are using this medicine.  Tell your doctor if you use anything else that makes you sleepy. Some examples are allergy medicine, narcotic pain medicine, and alcohol. Tell your doctor if you are also using oxycodone, lorazepam, or zolpidem.  Warnings While Using This Medicine:   Tell your doctor if you are pregnant or breastfeeding, or if you have kidney disease, heart failure, heart rhythm problems, lung or breathing problems, a bleeding disorder, diabetes, sores or skin problems, or a low blood platelet count. Tell your doctor if you have a history of angioedema (severe swelling), alcohol or drug abuse, depression, or other mood problems.  This medicine may cause the following problems:   Angioedema (severe swelling), which may be life-threatening  Changes in mood or behavior, including suicidal thoughts or behavior  Respiratory depression (serious breathing problem that can be life-threatening), when used with narcotic pain medicines  Peripheral edema (swelling of your hands, ankles, feet, or lower legs)  Increased risk for cancer and bleeding  Serious muscle problems  Heart rhythm changes  This medicine may make you dizzy or drowsy. It may also cause blurry or double vision. Do not drive or do anything else that could be dangerous until you know how this medicine affects you.  Do not stop using this medicine suddenly. Your doctor will need to slowly decrease your dose before you stop it completely.  Your doctor will do lab tests at regular visits to check on the effects of this medicine. Keep all appointments.  Keep all medicine out of the reach of children. Never share your medicine with anyone.  Possible Side Effects While Using This Medicine:   Call your doctor right away if you notice any of these side effects:  Allergic reaction: Itching or hives, swelling in your face or hands, swelling or tingling in your mouth or throat, chest tightness, trouble  breathing  Blistering, peeling, red skin rash  Blue lips, fingernails, or skin, trouble breathing, chest pain  Blurry or double vision  Fever, chills, cough, sore throat, body aches  Muscle pain, tenderness, or weakness, general feeling of illness  Rapid weight gain, swelling in your hands, ankles, or feet  Severe dizziness or drowsiness  Sudden mood changes, unusual moods or behavior, including extreme happiness or depression, thoughts or attempts of killing oneself  Swelling in your throat, head, or neck  Uneven heartbeat  Unusual bleeding, bruising, or weakness  If you notice these less serious side effects, talk with your doctor:   Confusion, trouble concentrating  Constipation  Dry mouth  If you notice other side effects that you think are caused by this medicine, tell your doctor.   Call your doctor for medical advice about side effects. You may report side effects to FDA at 0-039-FDA-9877  © Copyright [employer] 2023 Information is for End User's use only and may not be sold, redistributed or otherwise used for commercial purposes.  The above information is an  only. It is not intended as medical advice for individual conditions or treatments. Talk to your doctor, nurse or pharmacist before following any medical regimen to see if it is safe and effective for you.    Core Strengthening Exercises   WHAT YOU NEED TO KNOW:   Your core includes the muscles of your lower back, hip, pelvis, and abdomen. Core strengthening exercises help heal and strengthen these muscles. This helps prevent another injury, and keeps your pelvis, spine, and hips in the correct position.  DISCHARGE INSTRUCTIONS:   Call your doctor or physical therapist if:   You have sharp or worsening pain during exercise or at rest.    You have questions or concerns about your shoulder exercises.    Safety tips:  Talk to your healthcare provider before you start an exercise program. A physical therapist can teach you how to do core  strengthening exercises safely.  Do the exercises on a mat or firm surface.  A firm surface will support your spine and prevent low back pain. Do not do these exercises on a bed.    Move slowly and smoothly.  Avoid fast or jerky motions.    Stop if you feel pain.  You may feel some discomfort at first, but you should not feel pain. Tell your provider or physical therapist if you have pain while you exercise. Regular exercise will help decrease your discomfort over time.    Breathe normally during core exercises.  Do not hold your breath. This may cause an increase in blood pressure and prevent muscle strengthening. Your healthcare provider will tell you when to inhale and exhale during the exercise.    Begin all of your exercises with abdominal bracing.  Abdominal bracing helps warm up your core muscles. You can also practice abdominal bracing throughout the day. Lie on your back with your knees bent and feet flat on the floor. Place your arms in a relaxed position beside your body. Tighten your abdominal muscles. Pull your belly button in and up toward your spine. Hold for 5 seconds. Relax your muscles. Repeat 10 times.       Core strengthening exercises:  Your healthcare provider will tell you how often to do these exercises. The provider will also tell you how many repetitions of each exercise you should do. Hold each exercise for 5 seconds or as directed. As you get stronger, increase your hold to 10 to 15 seconds. You can do some of these exercises on a stability ball, or with a weight. Ask your healthcare provider how to use a stability ball or weight for these exercises:  Bridging:  Lie on your back with your knees bent and feet flat on the floor. Rest your arms at your side. Tighten your buttocks, and then lift your hips 1 inch off the floor. Hold for 5 seconds. When you can do this exercise without pain for 10 seconds, increase the distance you lift your hips. A good goal is to be able to lift your hips so  that your shoulders, hips, and knees are in a straight line.         Dead bug:  Lie on your back with your knees bent and feet flat on the floor. Place your arms in a relaxed position beside your body. Begin with abdominal bracing. Next, raise one leg, keeping your knee bent. Hold for 5 seconds. Repeat with the other leg. When you can do this exercise without pain for 10 to 15 seconds, you may raise one straight leg and hold. Repeat with the other leg.         Quadruped:  Place your hands and knees on the floor. Keep your wrists directly below your shoulders and your knees directly below your hips. Pull your belly button in toward your spine. Do not flatten or arch your back. Tighten your abdominal muscles below your belly button. Hold for 5 seconds. When you can do this exercise without pain for 10 to 15 seconds, you may extend one arm and hold. Repeat on the other side.         Side bridge exercises:      Standing side bridge:  Stand next to a wall and extend one arm toward the wall. Place your palm flat on the wall with your fingers pointing upward. Begin with abdominal bracing. Next, without moving your feet, slowly bend your arm to 90 degrees. Hold for 5 seconds. Repeat on the other side. When you can do this exercise without pain for 10 to 15 seconds, you may do the bent leg side bridge on the floor.         Bent leg side bridge:  Lie on one side with your legs, hips, and shoulders in a straight line. Prop yourself up onto your forearm so your elbow is directly below your shoulder. Bend your knees back to 90 degrees. Begin with abdominal bracing. Next, lift your hips and balance yourself on your forearm and knees. Hold for 5 seconds. Repeat on the other side. When you can do this exercise without pain for 10 to 15 seconds, you may do the straight leg side bridge on the floor.         Straight leg side bridge:  Lie on one side with your legs, hips, and shoulders in a straight line. Prop yourself up onto your  forearm so your elbow is directly below your shoulder. Begin with abdominal bracing. Lift your hips off the floor and balance yourself on your forearm and the outside of your flexed foot. Do not let your ankle bend sideways. Hold for 5 seconds. Repeat on the other side. When you can do this exercise without pain for 10 to 15 seconds, ask your healthcare provider for more advanced exercises.       Superman:  Lie on your stomach. Extend your arms forward on the floor. Tighten your abdominal muscles and lift your right hand and left leg off the floor. Hold this position. Slowly return to the starting position. Tighten your abdominal muscles and lift your left hand and right leg off the floor. Hold this position. Slowly return to the starting position.         Clam:  Lie on your side with your knees bent. Put your bottom arm under your head to keep your neck in line. Put your top hand on your hip to keep your pelvis from moving. Put your heels together, and keep them together during this exercise. Slowly raise your top knee toward the ceiling. Then lower your leg so your knees are together. Repeat this exercise 10 times. Then switch sides and do the exercise 10 times with the other leg.         Curl up:  Lie on your back with your knees bent and feet flat on the floor. Place your hands, palms down, underneath your lower back. Next, with your elbows on the floor, lift your shoulders and chest 2 to 3 inches off the floor. Keep your head in line with your shoulders. Hold this position. Slowly return to the starting position.         Straight leg raises:  Lie on your back with one leg straight. Bend the other knee and place your foot flat on the floor. Tighten your abdominal muscles. Keep your leg straight and slowly lift it straight up 6 to 12 inches off the floor. Hold this position. Lower your leg slowly. Do as many repetitions as directed on this side. Repeat with the other leg.         Plank:  Lie on your stomach. Bend  your elbows and place your forearms flat on the floor. Lift your chest, stomach, and knees off the floor. Make sure your elbows are below your shoulders. Your body should be in a straight line. Do not let your hips or lower back sink to the ground. Squeeze your abdominal muscles together and hold for 15 seconds. To make this exercise harder, hold for 30 seconds or lift 1 leg at a time.         Bicycles:  Lie on your back. Bend both knees and bring them toward your chest. Your calves should be parallel to the floor. Place the palms of your hands on the back of your head. Straighten your right leg and keep it lifted 2 inches off the floor. Raise your head and shoulders off the floor and twist towards your left. Keep your head and shoulders lifted. Bend your right knee while you straighten your left leg. Keep your left leg 2 inches off the floor. Twist your head and chest towards the left leg. Continue to straighten 1 leg at a time and twist.       Follow up with your doctor or physical therapist as directed:  Write down your questions so you remember to ask them during your visits.  © Copyright [employer] 2023 Information is for End User's use only and may not be sold, redistributed or otherwise used for commercial purposes.  The above information is an  only. It is not intended as medical advice for individual conditions or treatments. Talk to your doctor, nurse or pharmacist before following any medical regimen to see if it is safe and effective for you.

## 2024-02-28 ENCOUNTER — TELEPHONE (OUTPATIENT)
Dept: PAIN MEDICINE | Facility: CLINIC | Age: 54
End: 2024-02-28

## 2024-02-28 ENCOUNTER — ANESTHESIA (OUTPATIENT)
Age: 54
End: 2024-02-28

## 2024-02-28 ENCOUNTER — ANESTHESIA EVENT (OUTPATIENT)
Age: 54
End: 2024-02-28

## 2024-02-28 NOTE — TELEPHONE ENCOUNTER
PA for PREGABALIN    Submitted via    [x]CMM-KEY [id number]     []Surescripts-Case ID #   []Faxed to plan   []Other website   []Phone call Case ID #     Office notes sent, clinical questions answered. Awaiting determination    Turnaround time for your insurance to make a decision on your Prior Authorization can take 7-21 business days.

## 2024-03-04 ENCOUNTER — EVALUATION (OUTPATIENT)
Dept: PHYSICAL THERAPY | Facility: CLINIC | Age: 54
End: 2024-03-04
Payer: COMMERCIAL

## 2024-03-04 ENCOUNTER — ANESTHESIA EVENT (OUTPATIENT)
Age: 54
End: 2024-03-04
Payer: COMMERCIAL

## 2024-03-04 DIAGNOSIS — M54.16 LUMBAR RADICULOPATHY: ICD-10-CM

## 2024-03-04 PROCEDURE — 97530 THERAPEUTIC ACTIVITIES: CPT | Performed by: PHYSICAL THERAPIST

## 2024-03-04 PROCEDURE — 97110 THERAPEUTIC EXERCISES: CPT | Performed by: PHYSICAL THERAPIST

## 2024-03-04 PROCEDURE — 97162 PT EVAL MOD COMPLEX 30 MIN: CPT | Performed by: PHYSICAL THERAPIST

## 2024-03-04 NOTE — PROGRESS NOTES
PT Evaluation     Today's date: 3/4/2024  Patient name: Christopher Smalls Jr.  : 1970  MRN: [id number]  Referring provider: Kunal Maurer MD  Dx:   Encounter Diagnosis     ICD-10-CM    1. Lumbar radiculopathy - Right  M54.16 Ambulatory referral to Physical Therapy                     Assessment  Assessment details: Patient is a 54 y.o. male who presents to PT with a history of LBP with RLE radicular symptoms and right rotator cuff tear. He demonstrates a painful and limited AROM of the L/S. Patient demonstrates atrophy of the right quad and muscular hypertonicity of the L/S erector spinae. The RLE is weaker than the LLE. Patient is antalgic with transfers from sit to stand and sit to supine. Patient demonstrates decreased AROM and PROM of the right shoulder as well as poor strength. He was instruction with pendulum exercise as well as gentle L/S AROM for his HEP. He is expected to respond well to PT intervention.   Impairments: abnormal or restricted ROM, activity intolerance, impaired physical strength, lacks appropriate home exercise program and pain with function    Symptom irritability: highUnderstanding of Dx/Px/POC: excellent  Goals  STG - 4 weeks  Patient able to demonstrate increased L/S AROM by 25 %.  Patient to report pain at worst 5/10 in the L/S with daily activity.  Patient to be independent with HEP.  Patient able to complete self dressing tasks of the upper body without assistance following R shld surgery.  Patient able to complete hygiene tasks without assistance.  Patient to have increased right shoulder PROM to 90degrees into flexion.    LTG - 16 weeks  Patient able to demonstrate painfree AROM fo the L/S.  Patient able to demonstrate good posture in sitting and standing.  Patient able to demonstrate proper body mechanics for lifting and carrying items.  Patient to demonstrate core strength at  4+/5 .  Patient able to demonstrate strength of the right shoulder into flexion/ abduction to  4+/5.  Patient to demonstrate increased strength of the RIGHT shoulder into ER to 4+/5.  Patient able to demonstrate RIGHT shoulder AROM to WNL for completion of functional tasks.  Patient to note pain at worst with activity to be 1/10 SPR.  Patient to demonstrate posture in sitting and standing WNL.  Patient to be d/c to final/ revised HEP.     Plan  Plan details: Patient's evaluation is completed. Patient was instructed with a HEP this date. Patient has scheduled further PT sessions for treatment.    Patient would benefit from: PT eval and skilled physical therapy  Planned modality interventions: TENS, thermotherapy: hydrocollator packs, cryotherapy and electrical stimulation/Russian stimulation  Planned therapy interventions: manual therapy, neuromuscular re-education, therapeutic activities, therapeutic exercise and home exercise program  Frequency: 2x week  Duration in weeks: 16  Plan of Care beginning date: 3/4/2024  Plan of Care expiration date: 2024  Treatment plan discussed with: patient      Subjective Evaluation    History of Present Illness  Mechanism of injury: Patient notes that he has had pain in the back for about 6 months. He notes that he does have radicular RLE - tingling to the foot constant. He notes that sitting is somewhat worse than standing. Knees are painful at times. He notes that sleeping is challenging due to pain in the back. He is a  - so moves trailer around. He typically work 8-10 hours. Switching from Gabapentin to Lyrica.     Will undergo a R RCR on 3/14/24 with Dr. Blue. He notes he has had pain for over a year. As per- MRI - Complete insertional tear of the supraspinatus, with torn tendon edge retracted to the level of the humeral head apex. Patient is unsure how he injured the shoulder but attributes it to wear and tear.   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 without focal high-grade abnormality)  Treatments  Previous treatment: physical therapy      Objective     Active Range of Motion     Right Shoulder   Flexion: 66 degrees with pain  Extension: 34 degrees with pain  Abduction: 36 degrees with pain  External rotation 0°: 25 degrees with pain    Lumbar   Flexion:  with pain Restriction level: moderate  Extension:  with pain Restriction level: maximal  Left lateral flexion:  with pain Restriction level: moderate  Right lateral flexion:  with pain Restriction level: moderate  Left rotation:  with pain Restriction level: moderate  Right rotation:  with pain Restriction level: moderate    Additional Active Range of Motion Details  Tenderness diffusely throughout the lumbar region with palpation to musculature. No point specific pain with palpation.     Passive Range of Motion     Right Shoulder   Flexion: 90 degrees with pain  Extension: 40 degrees with pain  Abduction: 80 degrees with pain  External rotation 0°: 45 degrees with pain  Internal rotation 0°: 30 degrees with pain    Strength/Myotome Testing     Right Shoulder     Planes of Motion   Flexion: 3   Extension: 4   Abduction: 2+   Adduction: 3+   External rotation at 0°: 3-   Internal rotation at 0°: 3+     Left Hip   Planes of Motion   Flexion: 4  Extension: 4  Abduction: 4  Adduction: 4  External rotation: 4  Internal rotation: 4    Right Hip   Planes of Motion   Flexion: 4-  Extension: 4-  Abduction: 4-  Adduction: 4  External rotation: 4  Internal rotation: 4    Left Knee   Flexion: 4+  Extension: 4+    Right Knee   Flexion: 4  Extension: 4    Left Ankle/Foot   Dorsiflexion: 4+  Plantar flexion:  "4+    Right Ankle/Foot   Dorsiflexion: 4  Plantar flexion: 4           Precautions: none     Daily Treatment Diary:      Initial Evaluation Date: 03/04/24  Compliance 3/4                     Visit Number 1                    Re-Eval  IE                 MC   Foto Captured Y                           3/4                     Manual                      L/S STM ->                     R shld PROM 5 min                     Passive stretch of HS/ SKTC ->                     Ther-Ex                      LTR X10 5\"                      Hip IR stretch X5 10\"                                           Pendulums X10 ea                     Ball roll on table                                                                                                                                    Neuro Re-Ed                      Scap retractions                                   TA setting             TA with march             TA with SLR             Ther-Act              pt ed HEP instruction/ post-op considerations 10 min                                                Modalities                      Prone HP with TENs 10 min                                                             "

## 2024-03-04 NOTE — TELEPHONE ENCOUNTER
PA for PREGABALIN Approved   Date(s) approved UNTIL 02/28/2025          Pharmacy advised by [x]Fax                                     []Phone call    Approval letter scanned into Media Yes

## 2024-03-04 NOTE — PRE-PROCEDURE INSTRUCTIONS
Pre-Surgery Instructions:   Medication Instructions    amLODIPine (NORVASC) 5 mg tablet Take Day of Surgery; unless usually taken at night     atorvastatin (LIPITOR) 80 mg tablet Take Day of Surgery; unless usually taken at night     DULoxetine (Cymbalta) 60 mg delayed release capsule Take Day of Surgery; unless usually taken at night     ezetimibe (Zetia) 10 mg tablet Take Day of Surgery; unless usually taken at night     ibuprofen (MOTRIN) 800 mg tablet Avoid 1 week prior to surgery      metoprolol succinate (TOPROL-XL) 25 mg 24 hr tablet Take Day of Surgery; unless usually taken at night     omeprazole (PriLOSEC) 20 mg delayed release capsule Take Day of Surgery; unless usually taken at night     pregabalin (LYRICA) 75 mg capsule Take Day of Surgery; unless usually taken at night     SUMAtriptan (Imitrex) 50 mg tablet Take Day of Surgery; unless usually taken at night     Medication instructions for day surgery reviewed. Please use only a sip of water to take your instructed medications. Avoid all over the counter vitamins, supplements and NSAIDS for one week prior to surgery per anesthesia guidelines. Tylenol is ok to take as needed.     You will receive a call one business day prior to surgery with an arrival time and hospital directions. If your surgery is scheduled on a Monday, the hospital will be calling you on the Friday prior to your surgery. If you have not heard from anyone by 8pm, please call the hospital supervisor through the hospital  at [phone]. ([place] [phone] or [place] [phone]).    Do not eat or drink anything after midnight the night before your surgery, including candy, mints, lifesavers, or chewing gum. Do not drink alcohol 24hrs before your surgery. Try not to smoke at least 24hrs before your surgery.       Follow the pre surgery showering instructions as listed in the “My Surgical Experience Booklet” or otherwise provided by your surgeon's office. Do not use a  blade to shave the surgical area 1 week before surgery. It is okay to use a clean electric clippers up to 24 hours before surgery. Do not apply any lotions, creams, including makeup, cologne, deodorant, or perfumes after showering on the day of your surgery. Do not use dry shampoo, hair spray, hair gel, or any type of hair products.     No contact lenses, eye make-up, or artificial eyelashes. Remove nail polish, including gel polish, and any artificial, gel, or acrylic nails if possible. Remove all jewelry including rings and body piercing jewelry.     Wear causal clothing that is easy to take on and off. Consider your type of surgery.    Keep any valuables, jewelry, piercings at home. Please bring any specially ordered equipment (sling, braces) if indicated.    Arrange for a responsible person to drive you to and from the hospital on the day of your surgery. Please confirm the visitor policy for the day of your procedure when you receive your phone call with an arrival time.     Call the surgeon's office with any new illnesses, exposures, or additional questions prior to surgery.    Please reference your “My Surgical Experience Booklet” for additional information to prepare for your upcoming surgery.

## 2024-03-05 ENCOUNTER — APPOINTMENT (OUTPATIENT)
Dept: LAB | Facility: HOSPITAL | Age: 54
End: 2024-03-05

## 2024-03-05 ENCOUNTER — HOSPITAL ENCOUNTER (OUTPATIENT)
Dept: NUCLEAR MEDICINE | Facility: HOSPITAL | Age: 54
Discharge: HOME | End: 2024-03-05
Attending: INTERNAL MEDICINE
Payer: COMMERCIAL

## 2024-03-05 ENCOUNTER — HOSPITAL ENCOUNTER (OUTPATIENT)
Dept: NON INVASIVE DIAGNOSTICS | Facility: HOSPITAL | Age: 54
Discharge: HOME | End: 2024-03-05
Attending: INTERNAL MEDICINE
Payer: COMMERCIAL

## 2024-03-05 VITALS
WEIGHT: 212.08 LBS | DIASTOLIC BLOOD PRESSURE: 98 MMHG | BODY MASS INDEX: 33.29 KG/M2 | HEIGHT: 67 IN | SYSTOLIC BLOOD PRESSURE: 142 MMHG

## 2024-03-05 DIAGNOSIS — R06.09 DOE (DYSPNEA ON EXERTION): ICD-10-CM

## 2024-03-05 DIAGNOSIS — R07.9 CHEST PAIN, UNSPECIFIED TYPE: ICD-10-CM

## 2024-03-05 LAB
ALBUMIN SERPL BCP-MCNC: 4 G/DL (ref 3.5–5)
ALP SERPL-CCNC: 79 U/L (ref 34–104)
ALT SERPL W P-5'-P-CCNC: 20 U/L (ref 7–52)
ANION GAP SERPL CALCULATED.3IONS-SCNC: 5 MMOL/L
AORTIC ROOT: 3.1 CM
AORTIC VALVE MEAN VELOCITY: 10.3 M/S
APICAL FOUR CHAMBER EJECTION FRACTION: 52 %
ASCENDING AORTA: 3.6 CM
AST SERPL W P-5'-P-CCNC: 20 U/L (ref 13–39)
AV AREA BY CONTINUOUS VTI: 2.2 CM2
AV AREA PEAK VELOCITY: 1.9 CM2
AV LVOT MEAN GRADIENT: 2 MMHG
AV LVOT PEAK GRADIENT: 5 MMHG
AV MEAN GRADIENT: 5 MMHG
AV PEAK GRADIENT: 10 MMHG
AV REGURGITATION PRESSURE HALF TIME: 671 MS
AV VALVE AREA: 2.24 CM2
AV VELOCITY RATIO: 0.69
BASOPHILS # BLD AUTO: 0.05 THOUSANDS/ÂΜL (ref 0–0.1)
BASOPHILS NFR BLD AUTO: 1 % (ref 0–1)
BILIRUB SERPL-MCNC: 0.6 MG/DL (ref 0.2–1)
BUN SERPL-MCNC: 6 MG/DL (ref 5–25)
CALCIUM SERPL-MCNC: 8.6 MG/DL (ref 8.4–10.2)
CHLORIDE SERPL-SCNC: 108 MMOL/L (ref 96–108)
CHOLEST SERPL-MCNC: 110 MG/DL
CO2 SERPL-SCNC: 27 MMOL/L (ref 21–32)
CREAT SERPL-MCNC: 0.78 MG/DL (ref 0.6–1.3)
DOP CALC AO PEAK VEL: 1.56 M/S
DOP CALC AO VTI: 31.87 CM
DOP CALC LVOT AREA: 2.83 CM2
DOP CALC LVOT CARDIAC INDEX: 2.2 L/MIN/M2
DOP CALC LVOT CARDIAC OUTPUT: 4.55 L/MIN
DOP CALC LVOT DIAMETER: 1.9 CM
DOP CALC LVOT PEAK VEL VTI: 25.22 CM
DOP CALC LVOT PEAK VEL: 1.07 M/S
DOP CALC LVOT STROKE INDEX: 35.7 ML/M2
DOP CALC LVOT STROKE VOLUME: 71.47
E WAVE DECELERATION TIME: 246 MS
E/A RATIO: 0.98
EOSINOPHIL # BLD AUTO: 0.13 THOUSAND/ÂΜL (ref 0–0.61)
EOSINOPHIL NFR BLD AUTO: 4 % (ref 0–6)
ERYTHROCYTE [DISTWIDTH] IN BLOOD BY AUTOMATED COUNT: 12.5 % (ref 11.6–15.1)
EST. AVERAGE GLUCOSE BLD GHB EST-MCNC: 143 MG/DL
FRACTIONAL SHORTENING: 21 (ref 28–44)
GFR SERPL CREATININE-BSD FRML MDRD: 102 ML/MIN/1.73SQ M
GLUCOSE P FAST SERPL-MCNC: 114 MG/DL (ref 65–99)
HBA1C MFR BLD: 6.6 %
HCT VFR BLD AUTO: 38.3 % (ref 36.5–49.3)
HCV AB SER QL: NORMAL
HDLC SERPL-MCNC: 38 MG/DL
HGB BLD-MCNC: 12.9 G/DL (ref 12–17)
HIV 1+2 AB+HIV1 P24 AG SERPL QL IA: NORMAL
HIV 2 AB SERPL QL IA: NORMAL
HIV1 AB SERPL QL IA: NORMAL
HIV1 P24 AG SERPL QL IA: NORMAL
IMM GRANULOCYTES # BLD AUTO: 0.01 THOUSAND/UL (ref 0–0.2)
IMM GRANULOCYTES NFR BLD AUTO: 0 % (ref 0–2)
INTERVENTRICULAR SEPTUM IN DIASTOLE (PARASTERNAL SHORT AXIS VIEW): 1.2 CM
INTERVENTRICULAR SEPTUM: 1.2 CM (ref 0.6–1.1)
LAAS-AP2: 19.4 CM2
LAAS-AP4: 19.1 CM2
LDLC SERPL CALC-MCNC: 49 MG/DL (ref 0–100)
LEFT ATRIUM SIZE: 3.3 CM
LEFT ATRIUM VOLUME (MOD BIPLANE): 57 ML
LEFT ATRIUM VOLUME INDEX (MOD BIPLANE): 27.5 ML/M2
LEFT INTERNAL DIMENSION IN SYSTOLE: 3.4 CM (ref 2.1–4)
LEFT VENTRICLE DIASTOLIC VOLUME (MOD BIPLANE): 107 ML
LEFT VENTRICLE SYSTOLIC VOLUME (MOD BIPLANE): 47 ML
LEFT VENTRICULAR INTERNAL DIMENSION IN DIASTOLE: 4.3 CM (ref 3.5–6)
LEFT VENTRICULAR POSTERIOR WALL IN END DIASTOLE: 1.1 CM
LEFT VENTRICULAR STROKE VOLUME: 35 ML
LV EF: 56 %
LVSV (TEICH): 35 ML
LYMPHOCYTES # BLD AUTO: 1.87 THOUSANDS/ÂΜL (ref 0.6–4.47)
LYMPHOCYTES NFR BLD AUTO: 50 % (ref 14–44)
MAX HR PERCENT: 80 %
MAX HR: 137 BPM
MCH RBC QN AUTO: 29.7 PG (ref 26.8–34.3)
MCHC RBC AUTO-ENTMCNC: 33.7 G/DL (ref 31.4–37.4)
MCV RBC AUTO: 88 FL (ref 82–98)
MONOCYTES # BLD AUTO: 0.3 THOUSAND/ÂΜL (ref 0.17–1.22)
MONOCYTES NFR BLD AUTO: 8 % (ref 4–12)
MV E'TISSUE VEL-LAT: 11 CM/S
MV E'TISSUE VEL-SEP: 8 CM/S
MV PEAK A VEL: 0.57 M/S
MV PEAK E VEL: 56 CM/S
MV STENOSIS PRESSURE HALF TIME: 71 MS
MV VALVE AREA P 1/2 METHOD: 3.1
NEUTROPHILS # BLD AUTO: 1.36 THOUSANDS/ÂΜL (ref 1.85–7.62)
NEUTS SEG NFR BLD AUTO: 37 % (ref 43–75)
NONHDLC SERPL-MCNC: 72 MG/DL
NRBC BLD AUTO-RTO: 0 /100 WBCS
NUC STRESS EJECTION FRACTION: 56 %
PLATELET # BLD AUTO: 200 THOUSANDS/UL (ref 149–390)
PMV BLD AUTO: 10 FL (ref 8.9–12.7)
POTASSIUM SERPL-SCNC: 3.9 MMOL/L (ref 3.5–5.3)
PROT SERPL-MCNC: 6.7 G/DL (ref 6.4–8.4)
RA PRESSURE ESTIMATED: 3 MMHG
RATE PRESSURE PRODUCT: NORMAL
RBC # BLD AUTO: 4.34 MILLION/UL (ref 3.88–5.62)
RIGHT ATRIUM AREA SYSTOLE A4C: 16.1 CM2
RIGHT VENTRICLE ID DIMENSION: 4.4 CM
RV PSP: 23 MMHG
SL CV AV DECELERATION TIME RETROGRADE: 2315 MS
SL CV AV PEAK GRADIENT RETROGRADE: 77 MMHG
SL CV LEFT ATRIUM LENGTH A2C: 4.9 CM
SL CV LV EF: 56
SL CV PED ECHO LEFT VENTRICLE DIASTOLIC VOLUME (MOD BIPLANE) 2D: 84 ML
SL CV PED ECHO LEFT VENTRICLE SYSTOLIC VOLUME (MOD BIPLANE) 2D: 48 ML
SL CV REST NUCLEAR ISOTOPE DOSE: 11 MCI
SL CV STRESS NUCLEAR ISOTOPE DOSE: 32.3 MCI
SL CV STRESS RECOVERY BP: NORMAL MMHG
SL CV STRESS RECOVERY HR: 96 BPM
SL CV STRESS RECOVERY O2 SAT: 97 %
SL CV STRESS STAGE REACHED: 3
SODIUM SERPL-SCNC: 140 MMOL/L (ref 135–147)
STRESS ANGINA INDEX: 0
STRESS BASELINE BP: NORMAL MMHG
STRESS BASELINE HR: 72 BPM
STRESS O2 SAT REST: 98 %
STRESS PEAK HR: 134 BPM
STRESS POST ESTIMATED WORKLOAD: 10.1 METS
STRESS POST EXERCISE DUR MIN: 7 MIN
STRESS POST EXERCISE DUR SEC: 20 SEC
STRESS POST O2 SAT PEAK: 97 %
STRESS POST PEAK BP: 170 MMHG
STRESS/REST PERFUSION RATIO: 0.72
TR MAX PG: 20 MMHG
TR PEAK VELOCITY: 2.2 M/S
TRICUSPID ANNULAR PLANE SYSTOLIC EXCURSION: 2.4 CM
TRICUSPID VALVE PEAK REGURGITATION VELOCITY: 2.22 M/S
TRIGL SERPL-MCNC: 114 MG/DL
WBC # BLD AUTO: 3.72 THOUSAND/UL (ref 4.31–10.16)

## 2024-03-05 PROCEDURE — G1004 CDSM NDSC: HCPCS

## 2024-03-05 PROCEDURE — 93306 TTE W/DOPPLER COMPLETE: CPT

## 2024-03-05 PROCEDURE — 78452 HT MUSCLE IMAGE SPECT MULT: CPT

## 2024-03-05 PROCEDURE — 93017 CV STRESS TEST TRACING ONLY: CPT

## 2024-03-05 PROCEDURE — A9502 TC99M TETROFOSMIN: HCPCS

## 2024-03-05 RX ORDER — GABAPENTIN 300 MG/1
600 CAPSULE ORAL 3 TIMES DAILY
COMMUNITY
End: 2024-03-15

## 2024-03-07 ENCOUNTER — OFFICE VISIT (OUTPATIENT)
Dept: PHYSICAL THERAPY | Facility: CLINIC | Age: 54
End: 2024-03-07
Payer: COMMERCIAL

## 2024-03-07 DIAGNOSIS — M54.16 LUMBAR RADICULOPATHY: Primary | ICD-10-CM

## 2024-03-07 PROCEDURE — 97530 THERAPEUTIC ACTIVITIES: CPT

## 2024-03-07 PROCEDURE — 97110 THERAPEUTIC EXERCISES: CPT

## 2024-03-07 PROCEDURE — 97140 MANUAL THERAPY 1/> REGIONS: CPT

## 2024-03-07 NOTE — PROGRESS NOTES
"Daily Note     Today's date: 3/7/2024  Patient name: Christopher Smalls Jr.  : 1970  MRN: [id number]  Referring provider: Kunal Maurer MD  Dx:   Encounter Diagnosis     ICD-10-CM    1. Lumbar radiculopathy - Right  M54.16                      Subjective: Patient reports 6/10 LBP at beginning of session. He notes that the shoulder is also very sore and he is eager for his operation next week.      Objective: See treatment diary below      Assessment: Tolerated treatment well without complaint. Patient required moderate verbal cues to perform exercises with appropriate technique and intensity. Discussed expectations following shoulder operation and reviewed initial HEP and he verbalized understanding. Patient would benefit from continued PT to increase trunk mobility and core strength for improved function in daily activities.       Plan: Continue per plan of care.  Patient to call and schedule when appropriate per surgeon following RTC repair. Patient will be re-evaluated at this point.     Precautions: none     Daily Treatment Diary:      Initial Evaluation Date: 24  Compliance 3/4  3/7                   Visit Number 1 2                   Re-Eval  IE                 MC   Foto Captured Y                           3/4  3/7                   Manual                      L/S STM ->  10 min                   R shld PROM 5 min                     Passive stretch of HS/ SKTC ->                     Ther-Ex                      LTR X10 5\"  5\"x10                   Hip IR stretch X5 10\" 10\"x5                   SKTC c towel  5\"x10                                              Pendulums X10 ea                     Ball roll on table                                                                                                                                    Neuro Re-Ed                      Scap retractions                                   TA setting  5\"x10           TA with march  x10           TA with SLR  x10  "          Ther-Act              pt ed HEP instruction/ post-op considerations 10 min  post op review 8 min                                              Modalities                      Prone HP with TENs 10 min Mhp 10 min

## 2024-03-11 LAB
CHEST PAIN STATEMENT: NORMAL
MAX DIASTOLIC BP: 80 MMHG
MAX PREDICTED HEART RATE: 166 BPM
PROTOCOL NAME: NORMAL
STRESS POST EXERCISE DUR MIN: 7 MIN
STRESS POST EXERCISE DUR SEC: 20 SEC
STRESS POST PEAK HR: 134 BPM
STRESS POST PEAK SYSTOLIC BP: 170 MMHG
TARGET HR FORMULA: NORMAL
TEST INDICATION: NORMAL

## 2024-03-13 ENCOUNTER — ANESTHESIA (OUTPATIENT)
Age: 54
End: 2024-03-13
Payer: COMMERCIAL

## 2024-03-13 ENCOUNTER — HOSPITAL ENCOUNTER (OUTPATIENT)
Age: 54
Setting detail: OUTPATIENT SURGERY
Discharge: HOME | End: 2024-03-13
Attending: ORTHOPAEDIC SURGERY | Admitting: ORTHOPAEDIC SURGERY
Payer: COMMERCIAL

## 2024-03-13 VITALS
BODY MASS INDEX: 32.68 KG/M2 | HEART RATE: 81 BPM | WEIGHT: 208.2 LBS | HEIGHT: 67 IN | RESPIRATION RATE: 18 BRPM | OXYGEN SATURATION: 93 % | TEMPERATURE: 97.4 F | SYSTOLIC BLOOD PRESSURE: 153 MMHG | DIASTOLIC BLOOD PRESSURE: 91 MMHG

## 2024-03-13 DIAGNOSIS — Z98.890 STATUS POST ROTATOR CUFF REPAIR: Primary | ICD-10-CM

## 2024-03-13 LAB
GLUCOSE SERPL-MCNC: 115 MG/DL (ref 65–140)
GLUCOSE SERPL-MCNC: 117 MG/DL (ref 65–140)

## 2024-03-13 PROCEDURE — C1713 ANCHOR/SCREW BN/BN,TIS/BN: HCPCS | Performed by: ORTHOPAEDIC SURGERY

## 2024-03-13 PROCEDURE — 29826 SHO ARTHRS SRG DECOMPRESSION: CPT | Performed by: ORTHOPAEDIC SURGERY

## 2024-03-13 PROCEDURE — 29827 SHO ARTHRS SRG RT8TR CUF RPR: CPT | Performed by: ORTHOPAEDIC SURGERY

## 2024-03-13 PROCEDURE — C9290 INJ, BUPIVACAINE LIPOSOME: HCPCS | Performed by: ANESTHESIOLOGY

## 2024-03-13 PROCEDURE — 82948 REAGENT STRIP/BLOOD GLUCOSE: CPT

## 2024-03-13 DEVICE — SP FBRTAK RC FBRTPE BLU & STTPE WH/BLK
Type: IMPLANTABLE DEVICE | Site: SHOULDER | Status: FUNCTIONAL
Brand: ARTHREX®

## 2024-03-13 DEVICE — 4.75MM BC KNOTLESS SWIVELOCK
Type: IMPLANTABLE DEVICE | Site: SHOULDER | Status: FUNCTIONAL
Brand: ARTHREX®

## 2024-03-13 DEVICE — SP FBRTAK RC FBRTPE BLK/BLU & STTPE BLU
Type: IMPLANTABLE DEVICE | Site: SHOULDER | Status: FUNCTIONAL
Brand: ARTHREX®

## 2024-03-13 RX ORDER — SODIUM CHLORIDE, SODIUM LACTATE, POTASSIUM CHLORIDE, CALCIUM CHLORIDE 600; 310; 30; 20 MG/100ML; MG/100ML; MG/100ML; MG/100ML
125 INJECTION, SOLUTION INTRAVENOUS CONTINUOUS
Status: DISCONTINUED | OUTPATIENT
Start: 2024-03-13 | End: 2024-03-13 | Stop reason: HOSPADM

## 2024-03-13 RX ORDER — EPHEDRINE SULFATE 50 MG/ML
INJECTION INTRAVENOUS AS NEEDED
Status: DISCONTINUED | OUTPATIENT
Start: 2024-03-13 | End: 2024-03-13

## 2024-03-13 RX ORDER — SODIUM CHLORIDE, SODIUM LACTATE, POTASSIUM CHLORIDE, CALCIUM CHLORIDE 600; 310; 30; 20 MG/100ML; MG/100ML; MG/100ML; MG/100ML
100 INJECTION, SOLUTION INTRAVENOUS CONTINUOUS
Status: DISCONTINUED | OUTPATIENT
Start: 2024-03-13 | End: 2024-03-13 | Stop reason: HOSPADM

## 2024-03-13 RX ORDER — GLYCOPYRROLATE 0.2 MG/ML
INJECTION INTRAMUSCULAR; INTRAVENOUS AS NEEDED
Status: DISCONTINUED | OUTPATIENT
Start: 2024-03-13 | End: 2024-03-13

## 2024-03-13 RX ORDER — CEFAZOLIN SODIUM 2 G/50ML
2000 SOLUTION INTRAVENOUS ONCE
Status: DISCONTINUED | OUTPATIENT
Start: 2024-03-13 | End: 2024-03-13 | Stop reason: HOSPADM

## 2024-03-13 RX ORDER — DEXAMETHASONE SODIUM PHOSPHATE 10 MG/ML
INJECTION, SOLUTION INTRAMUSCULAR; INTRAVENOUS AS NEEDED
Status: DISCONTINUED | OUTPATIENT
Start: 2024-03-13 | End: 2024-03-13

## 2024-03-13 RX ORDER — ONDANSETRON 2 MG/ML
INJECTION INTRAMUSCULAR; INTRAVENOUS AS NEEDED
Status: DISCONTINUED | OUTPATIENT
Start: 2024-03-13 | End: 2024-03-13

## 2024-03-13 RX ORDER — ACETAMINOPHEN 325 MG/1
325 TABLET ORAL EVERY 6 HOURS PRN
Qty: 30 TABLET | Refills: 0 | Status: SHIPPED | OUTPATIENT
Start: 2024-03-13

## 2024-03-13 RX ORDER — MIDAZOLAM HYDROCHLORIDE 2 MG/2ML
INJECTION, SOLUTION INTRAMUSCULAR; INTRAVENOUS AS NEEDED
Status: DISCONTINUED | OUTPATIENT
Start: 2024-03-13 | End: 2024-03-13

## 2024-03-13 RX ORDER — LIDOCAINE HYDROCHLORIDE 10 MG/ML
INJECTION, SOLUTION EPIDURAL; INFILTRATION; INTRACAUDAL; PERINEURAL AS NEEDED
Status: DISCONTINUED | OUTPATIENT
Start: 2024-03-13 | End: 2024-03-13

## 2024-03-13 RX ORDER — ROCURONIUM BROMIDE 10 MG/ML
INJECTION, SOLUTION INTRAVENOUS AS NEEDED
Status: DISCONTINUED | OUTPATIENT
Start: 2024-03-13 | End: 2024-03-13

## 2024-03-13 RX ORDER — BUPIVACAINE HYDROCHLORIDE 5 MG/ML
INJECTION, SOLUTION EPIDURAL; INTRACAUDAL
Status: COMPLETED | OUTPATIENT
Start: 2024-03-13 | End: 2024-03-13

## 2024-03-13 RX ORDER — ONDANSETRON 2 MG/ML
4 INJECTION INTRAMUSCULAR; INTRAVENOUS ONCE AS NEEDED
Status: DISCONTINUED | OUTPATIENT
Start: 2024-03-13 | End: 2024-03-13 | Stop reason: HOSPADM

## 2024-03-13 RX ORDER — NAPROXEN 500 MG/1
500 TABLET ORAL 2 TIMES DAILY WITH MEALS
Qty: 60 TABLET | Refills: 0 | Status: SHIPPED | OUTPATIENT
Start: 2024-03-13

## 2024-03-13 RX ORDER — CEFAZOLIN SODIUM 2 G/50ML
SOLUTION INTRAVENOUS AS NEEDED
Status: DISCONTINUED | OUTPATIENT
Start: 2024-03-13 | End: 2024-03-13

## 2024-03-13 RX ORDER — HYDROMORPHONE HCL IN WATER/PF 6 MG/30 ML
0.2 PATIENT CONTROLLED ANALGESIA SYRINGE INTRAVENOUS
Status: DISCONTINUED | OUTPATIENT
Start: 2024-03-13 | End: 2024-03-13 | Stop reason: HOSPADM

## 2024-03-13 RX ORDER — SODIUM CHLORIDE, SODIUM LACTATE, POTASSIUM CHLORIDE, CALCIUM CHLORIDE 600; 310; 30; 20 MG/100ML; MG/100ML; MG/100ML; MG/100ML
INJECTION, SOLUTION INTRAVENOUS CONTINUOUS PRN
Status: DISCONTINUED | OUTPATIENT
Start: 2024-03-13 | End: 2024-03-13

## 2024-03-13 RX ORDER — FENTANYL CITRATE 50 UG/ML
INJECTION, SOLUTION INTRAMUSCULAR; INTRAVENOUS AS NEEDED
Status: DISCONTINUED | OUTPATIENT
Start: 2024-03-13 | End: 2024-03-13

## 2024-03-13 RX ORDER — FENTANYL CITRATE/PF 50 MCG/ML
25 SYRINGE (ML) INJECTION
Status: DISCONTINUED | OUTPATIENT
Start: 2024-03-13 | End: 2024-03-13 | Stop reason: HOSPADM

## 2024-03-13 RX ORDER — PROPOFOL 10 MG/ML
INJECTION, EMULSION INTRAVENOUS AS NEEDED
Status: DISCONTINUED | OUTPATIENT
Start: 2024-03-13 | End: 2024-03-13

## 2024-03-13 RX ORDER — PROPOFOL 10 MG/ML
INJECTION, EMULSION INTRAVENOUS CONTINUOUS PRN
Status: DISCONTINUED | OUTPATIENT
Start: 2024-03-13 | End: 2024-03-13

## 2024-03-13 RX ORDER — OXYCODONE HYDROCHLORIDE 5 MG/1
5 TABLET ORAL EVERY 4 HOURS PRN
Qty: 15 TABLET | Refills: 0 | Status: SHIPPED | OUTPATIENT
Start: 2024-03-13

## 2024-03-13 RX ORDER — OXYCODONE HYDROCHLORIDE 5 MG/1
5 TABLET ORAL EVERY 4 HOURS PRN
Status: DISCONTINUED | OUTPATIENT
Start: 2024-03-13 | End: 2024-03-13 | Stop reason: HOSPADM

## 2024-03-13 RX ADMIN — FENTANYL CITRATE 25 MCG: 50 INJECTION INTRAMUSCULAR; INTRAVENOUS at 15:18

## 2024-03-13 RX ADMIN — SUGAMMADEX 200 MG: 100 INJECTION, SOLUTION INTRAVENOUS at 14:17

## 2024-03-13 RX ADMIN — PROPOFOL 80 MCG/KG/MIN: 10 INJECTION, EMULSION INTRAVENOUS at 12:53

## 2024-03-13 RX ADMIN — EPINEPHRINE: 1 INJECTION INTRAMUSCULAR; INTRAVENOUS; SUBCUTANEOUS at 13:25

## 2024-03-13 RX ADMIN — MIDAZOLAM 2 MG: 1 INJECTION INTRAMUSCULAR; INTRAVENOUS at 12:31

## 2024-03-13 RX ADMIN — ROCURONIUM BROMIDE 50 MG: 10 INJECTION, SOLUTION INTRAVENOUS at 12:50

## 2024-03-13 RX ADMIN — SODIUM CHLORIDE, SODIUM LACTATE, POTASSIUM CHLORIDE, AND CALCIUM CHLORIDE 125 ML/HR: .6; .31; .03; .02 INJECTION, SOLUTION INTRAVENOUS at 11:34

## 2024-03-13 RX ADMIN — BUPIVACAINE HYDROCHLORIDE 15 ML: 5 INJECTION, SOLUTION EPIDURAL; INTRACAUDAL; PERINEURAL at 12:38

## 2024-03-13 RX ADMIN — FENTANYL CITRATE 100 MCG: 50 INJECTION INTRAMUSCULAR; INTRAVENOUS at 12:45

## 2024-03-13 RX ADMIN — PROPOFOL 50 MG: 10 INJECTION, EMULSION INTRAVENOUS at 12:54

## 2024-03-13 RX ADMIN — DEXAMETHASONE SODIUM PHOSPHATE 10 MG: 10 INJECTION INTRAMUSCULAR; INTRAVENOUS at 12:57

## 2024-03-13 RX ADMIN — BUPIVACAINE 10 ML: 13.3 INJECTION, SUSPENSION, LIPOSOMAL INFILTRATION at 12:38

## 2024-03-13 RX ADMIN — PROPOFOL 150 MG: 10 INJECTION, EMULSION INTRAVENOUS at 12:49

## 2024-03-13 RX ADMIN — PHENYLEPHRINE HYDROCHLORIDE 30 MCG/MIN: 10 INJECTION INTRAVENOUS at 12:50

## 2024-03-13 RX ADMIN — ONDANSETRON 4 MG: 2 INJECTION INTRAMUSCULAR; INTRAVENOUS at 13:01

## 2024-03-13 RX ADMIN — FENTANYL CITRATE 25 MCG: 50 INJECTION INTRAMUSCULAR; INTRAVENOUS at 15:29

## 2024-03-13 RX ADMIN — CEFAZOLIN SODIUM 2000 MG: 2 SOLUTION INTRAVENOUS at 12:52

## 2024-03-13 RX ADMIN — EPHEDRINE SULFATE 5 MG: 50 INJECTION, SOLUTION INTRAVENOUS at 13:14

## 2024-03-13 RX ADMIN — OXYCODONE HYDROCHLORIDE 5 MG: 5 TABLET ORAL at 15:54

## 2024-03-13 RX ADMIN — GLYCOPYRROLATE 0.1 MG: 0.2 INJECTION, SOLUTION INTRAMUSCULAR; INTRAVENOUS at 13:06

## 2024-03-13 RX ADMIN — LIDOCAINE HYDROCHLORIDE 50 MG: 10 INJECTION, SOLUTION EPIDURAL; INFILTRATION; INTRACAUDAL; PERINEURAL at 12:47

## 2024-03-13 RX ADMIN — SODIUM CHLORIDE, SODIUM LACTATE, POTASSIUM CHLORIDE, AND CALCIUM CHLORIDE: .6; .31; .03; .02 INJECTION, SOLUTION INTRAVENOUS at 12:39

## 2024-03-13 NOTE — ANESTHESIA PREPROCEDURE EVALUATION
Procedure:  REPAIR ROTATOR CUFF  ARTHROSCOPIC (Right: Shoulder)  ARTHROSCOPIC BICEPS TENODESIS (Right: Shoulder)    Relevant Problems   CARDIO   (+) Aortic regurgitation   (+) Coronary artery disease of native artery of native heart with stable angina pectoris (HCC)   (+) Essential hypertension   (+) Mitral regurgitation   (+) Mixed hyperlipidemia      GI/HEPATIC   (+) GE reflux      /RENAL   (+) Prostate CA (HCC)      NEURO/PSYCH   (+) Chronic intractable headache   (+) Chronic pain of both shoulders   (+) Depression   (+) TIA (transient ischemic attack)   S/p PCI with 2 stents, has not been taking his aspirin since 1 year. Will restart after surgery      Physical Exam    Airway    Mallampati score: II  TM Distance: >3 FB  Neck ROM: full     Dental       Cardiovascular  Cardiovascular exam normal    Pulmonary  Pulmonary exam normal     Other Findings      Anesthesia Plan  ASA Score- 3     Anesthesia Type- general with ASA Monitors.         Additional Monitors:     Airway Plan: ETT.           Plan Factors-Exercise tolerance (METS): >4 METS.    Chart reviewed. EKG reviewed.  Existing labs reviewed. Patient summary reviewed.    Patient is not a current smoker.      Obstructive sleep apnea risk education given perioperatively.        Induction- intravenous.    Postoperative Plan- Plan for postoperative opioid use. Planned trial extubation    Informed Consent- Anesthetic plan and risks discussed with patient and sibling.  I personally reviewed this patient with the CRNA. Discussed and agreed on the Anesthesia Plan with the CRNA..

## 2024-03-13 NOTE — DISCHARGE INSTR - AVS FIRST PAGE
POSTOPERATIVE INSTRUCTIONS following ROTATOR CUFF REPAIR    MEDICATIONS:  Resume all home medications unless otherwise instructed by your surgeon.  Pain Medication:  Oxycodone 5 mg, 1 tablets every 4 hours as needed  If you were given a regional anesthetic (nerve block), please begin taking the pain medication as soon as you get home, even if you have minimal or no pain.  DO NOT WAIT FOR THE NERVE BLOCK TO WEAR OFF.  Possible side effects include nausea, constipation, and urinary retention.  If you experience these side effects, please call our office for assistance.  Pain med refills are authorized only during office hours (8am-4pm, Mon-Fri).  Anti-Inflammatory:  Naproxen 500 mg, 1 tablet every 12 hours for 4 weeks and Tylenol 325 mg, 1-2 tablets every 6 hours for 4 weeks  TAKE WITH FOOD.  Stop if you experience nausea, reflux, or stomach pain.    WOUND CARE:  Keep the dressing clean and dry.  Light drainage may occur the first 2 days postop.  You may remove the dressings and get the incision wet in the shower 72 hours after surgery.  Do not remove steri-strips or sutures.  Do not immerse the incision under water.  Carefully pat the incision dry.  If there is wound drainage, re-apply a fresh dry gauze dressing.  Please call our office ([phone]) if you experience either of the following:  Sudden increase in swelling, redness, or warmth at the surgical site  Excessive incisional drainage that persists beyond the 3rd day after surgery  Oral temperature greater than 101 degrees, not relieved with Tylenol  Shortness of breath, chest pain, nausea, or any other concerning symptoms    SWELLING CONTROL:  Cold Therapy:  The cold therapy device may be used either continuously or only as needed, according to your preference.  Do not let the pad directly touch your skin.  Alternatively, apply ice (20 min on, 20 min off) as often as you feel is necessary.    SLING:  Wear your sling at all times (including sleep) until your  first postoperative office visit.  You may remove the sling for showering but must keep your arm at your side.    ACTIVITY:   Do not lift, carry, push, or pull anything with your operative arm.  You are not allowed range of motion of your shoulder until you are given permission from your surgeon. You may do gentle range of motion of the elbow, wrist, and fingers.  Place a pillow behind the elbow while lying down.  Sleeping in a more upright position (recliner) may be more comfortable initially.  Wrist / Finger Motion:  With the sling on, move your wrist and fingers through a full range of motion 20 times per hour while awake.    PHYSICAL THERAPY:  You may begin physical therapy after you are seen in the office for your postoperative appointment. We will place a referral to physical therapy after the procedure so you can call in advance to get an appointment set up after the first postoperative appointment.    FOLLOW-UP APPOINTMENT:  10-14 days after surgery with:    Dr. Randy Blue MD  [facility]  [address] [place], [address] ([facility])  [address] [place], [address] [facility])  [phone]

## 2024-03-13 NOTE — ANESTHESIA PROCEDURE NOTES
Peripheral Block    Patient location during procedure: holding area  Start time: 3/13/2024 12:38 PM  Reason for block: at surgeon's request and post-op pain management  Staffing  Performed by: Aaron Diane MD  Authorized by: Aaron Diane MD    Preanesthetic Checklist  Completed: patient identified, IV checked, site marked, risks and benefits discussed, surgical consent, monitors and equipment checked, pre-op evaluation and timeout performed  Peripheral Block  Patient position: sitting  Prep: ChloraPrep  Patient monitoring: frequent blood pressure checks, continuous pulse oximetry and heart rate  Block type: Interscalene  Laterality: right  Injection technique: single-shot  Procedures: ultrasound guided, Ultrasound guidance required for the procedure to increase accuracy and safety of medication placement and decrease risk of complications.  Ultrasound permanent image savedbupivacaine (PF) (MARCAINE) 0.5 % injection 20 mL - Perineural   15 mL - 3/13/2024 12:38:00 PM  bupivacaine liposomal (EXPAREL) 1.3 % injection 20 mL - Perineural   10 mL - 3/13/2024 12:38:00 PM  Needle  Needle type: Stimuplex   Needle gauge: 22 G  Needle length: 2 in  Needle localization: anatomical landmarks and ultrasound guidance  Assessment  Injection assessment: incremental injection, frequent aspiration, injected with ease, negative aspiration, negative for heart rate change, no paresthesia on injection, no symptoms of intraneural/intravenous injection and needle tip visualized at all times  Paresthesia pain: none  Post-procedure:  site cleaned  patient tolerated the procedure well with no immediate complications

## 2024-03-13 NOTE — ANESTHESIA POSTPROCEDURE EVALUATION
Post-Op Assessment Note    CV Status:  Stable    Pain management: adequate       Mental Status:  Alert and sleepy   Hydration Status:  Stable   PONV Controlled:  None   Airway Patency:  Patent and adequate  Two or more mitigation strategies used for obstructive sleep apnea   Post Op Vitals Reviewed: Yes    No anethesia notable event occurred.    Staff: CRNA, Anesthesiologist               BP   131/75   Temp 97.5 F   Pulse 78   Resp   15   Spo2 99%

## 2024-03-13 NOTE — OP NOTE
OPERATIVE REPORT  PATIENT NAME: Christopher Smalls Jr.    :  1970  MRN: [id number]  Pt Location: [facility] OR ROOM 06    SURGERY DATE: 3/13/2024    Surgeons and Role:     * Randy Blue MD - Primary     * Letitia Proctor MD - Assisting     * KAREN Gibbs-C - Assisting    Preop Diagnosis:  Traumatic complete tear of right rotator cuff, initial encounter [S46.011A]    Post-Op Diagnosis Codes:     * Traumatic complete tear of right rotator cuff, initial encounter [S46.011A]    Procedure(s):  Right - REPAIR ROTATOR CUFF  ARTHROSCOPIC. subacromial decompression    Specimen(s):  * No specimens in log *    Estimated Blood Loss:   Minimal    Drains:  * No LDAs found *    Anesthesia Type:   General w/ Regional    Operative Indications:  Traumatic complete tear of right rotator cuff, initial encounter [S46.011A]      Operative Findings:  As Expected    Complications:   None    Procedure and Technique:  NAME OF OPERATION:    Right Shoulder arthroscopic rotator cuff repair of the supraspinatus and infraspinatus.  Subacromial decompression    ASSISTANT: I requested KAREN Ortiz to assist with this procedure. Assistance was medically necessary in order to safely perform the procedure without increased blood loss or morbidity. Assistance was provided through positioning, instrumentation, wound closure, and instillation of anesthetic, application of sterile dressing, and safe transport from the operative suite.    There was no qualified resident available to assist    INDICATIONS:  Christopher Smalls Jr. is a 54 y.o. who had injured his shoulder, followed by pain and dysfunction. MRI was consistent with full thickness rotator cuff tear. he had failed conservative measures and was therefore indicated for surgical repair.  he understood the risks, benefits and alternatives to the procedure, and elected to proceed.    The patient was identified in the preoperative holding area and the correct operative site was signed. The  patient was then brought into the Operating Room and Anesthesia then successfully introduced a regional block without complications. The patient was then positioned in a beach chair position. The operative shoulder and upper extremity were prepped and draped in the usual sterile fashion. Prior to making incision, a time-out occurred at which time all members of the surgical team confirmed the patient identity, laterality and correct operation against the informed written consent. It was also confirmed that the patient received preoperative antibiotics.    A posterior portal was established. The arthroscope was inserted into the glenohumeral joint. An anterior portal was established in the rotator interval. Diagnostic arthroscopy then took place. The articular surfaces of the glenoid and humeral head were within normal limits. The labrum circumferentially had some mild fraying that was gently debrided with an arthroscopic shaver. The labral attachment to the bone was solid, therefore, no labral repair was warranted.  The long head of the biceps tendon was normal. The supraspinatus and infraspinatus tendons had a full thickness tear with minimal retraction. The subscapularis and teres minor tendons were within normal limits.    The arthroscope was inserted into the subacromial space. There was an impingement lesion of the CA ligament. The CA ligament was removed, revealing a Type II acromial spur. A 5.5 shaver was used to claudette this down such that it was flush with the remainder of the acromion. An extensive bursectomy then took place; the bursa was hyperemic as was the rotator cuff. This revealed a crescent shaped  2 cm x 2 cm tear in the supraspinatus tendon with minimal retraction and good quality tendon. The greater tuberosity footprint was then abraded down to bleeding bone. Two knotless anchors were placed at the articular margin. A scorpion was then used to pas the sutures through the tendon.  Special care was  taken to avoid the biceps tendon.    The medial row was then placed through two knotless anchors; these were placed in the posterolateral and anterolateral aspect of the proximal humerus, bringing the leading edges of the cuff down to the footprint. This provided an excellent transosseous-equivalent repair of the rotator cuff, and reduced the rotator cuff back to its anatomic position. The shoulder was internally and externally rotated and abducted, and the rotator cuff repair moved as a single unit demonstrating adequate rotator cuff repair.    At this point, all arthroscopic instruments were removed. The portal sites were closed with 3-0 nylon. Dry sterile dressing was applied. Patient was placed in a shoulder immobilizer and brought to the recovery room in stable condition.    At the conclusion of the case, the patient tolerated the procedure well. No complications. All sponge and instrument counts were correct.        Post-Operative Rehabilitation Guidelines for Standard Rotator Cuff Tears    1-4 Weeks:   Sling Immobilization  Active ROM Elbow, Wrist and Hand  True Passive (ONLY) ROM Shoulder. NO ACTIVE MOTION.   Pendulums, Supine Elevation in Scapular plane, External Rotation to tolerance with arm at side. (emphasize ER, minimum goal 40°)  Scapular Stabilization exercises (sidelying)  Deltoid isometrics in neutral (submaximal) as ROM improves  No Pulley/Canes until 6 weeks post-op (these are active motions)    4-6 Weeks:   Discontinue sling use.  Begin Active Assist ROM and advance to Active as Tolerated  Elevation in scapular plane and external rotation as tolerated  No Internal rotation or behind back until 6wks.  Begin Cuff Isometrics at 6wks with arm at the side    6-12 Weeks:   Active Assist to Active ROM Shoulder As Tolerated  Elevation in scapular plane and external rotation to tolerance  Begin internal rotation as tolerated  Light stretching at end ranges  Cuff Isometrics with the arm at the  side    3-12 Months:  Advance to full ROM as tolerated with passive stretching at end ranges  Advance strengthening as tolerated: isometrics ? bands ? light weights (1-5   lbs); 8-12 reps/2-3 sets per rotator cuff, deltoid, and scapular stabilizers  Limit strengthening 3x/week to avoid rotator cuff tendonitis  Begin eccentrically resisted motions, pylometrics (ex. Weighted ball toss),  proprioception (es. body blade)  Begin sports related rehab at 4 ½ months, including advanced conditioning  Return to throwing at 6 months  Throw from pitcher's mound at 9 months  Collision sports at 9 months  MMI is usually at 12 months post-op      I was present for the entire procedure.    Patient Disposition:  PACU         SIGNATURE: Randy Blue MD  DATE: March 13, 2024  TIME: 2:08 PM

## 2024-03-18 PROBLEM — Z86.79 HISTORY OF CARDIOMYOPATHY: Status: ACTIVE | Noted: 2024-03-18

## 2024-03-25 ENCOUNTER — OFFICE VISIT (OUTPATIENT)
Dept: OBGYN CLINIC | Facility: CLINIC | Age: 54
End: 2024-03-25

## 2024-03-25 VITALS
SYSTOLIC BLOOD PRESSURE: 128 MMHG | WEIGHT: 208 LBS | BODY MASS INDEX: 32.65 KG/M2 | HEIGHT: 67 IN | HEART RATE: 74 BPM | DIASTOLIC BLOOD PRESSURE: 82 MMHG

## 2024-03-25 DIAGNOSIS — Z98.890 STATUS POST RIGHT ROTATOR CUFF REPAIR: Primary | ICD-10-CM

## 2024-03-25 PROCEDURE — 99024 POSTOP FOLLOW-UP VISIT: CPT | Performed by: ORTHOPAEDIC SURGERY

## 2024-03-25 NOTE — LETTER
March 25, 2024     Patient: Christopher Smalls Jr.  YOB: 1970  Date of Visit: 3/25/2024      To Whom it May Concern:    Christopher Smalls is under my professional care. Christopher was seen in my office on 3/25/2024. Christopher may return to work on 3/25/24. Patient should be on light duty and avoid lifting with the right upper extremity for the next 3 months. He may drive without restriction .    If you have any questions or concerns, please don't hesitate to call.         Sincerely,          Randy Blue MD        CC: No Recipients

## 2024-03-25 NOTE — PROGRESS NOTES
Subjective   CHIEF COMPLAINT/REASON FOR VISIT  Christopher Smalls Jr. is a 54 y.o. male who presents for 2 week post op follow-up after REPAIR ROTATOR CUFF  ARTHROSCOPIC, subacromial decompression - Right on 3/13/2024     HISTORY OF PRESENT ILLNESS  Overall, he feels things are going well and progressing appropriately. Pain has been well controlled. Currently, he is doing well without any specific concerns. He has not started PT yet.    Objective   PHYSICAL EXAMINATION  Right Shoulder  Surgical incisions are healed.   ROM of shoulder not tested  ROM of wrist, elbows, and fingers intact  Fingers warm and perfused  NVID    Assessment/Plan   1. Status Post REPAIR ROTATOR CUFF  ARTHROSCOPIC, subacromial decompression - Right    He is doing well after surgery and making appropriate progress. Sutures removed. Encouraged to work with PT on rotator cuff repair protocol. PT order placed. Advised he could wean out of the sling over the next couple weeks.    We will plan to see him back at the 3 month post-operative sherman. If any issues, questions, or concerns arise between now and the next appointment, we have encouraged the patient contact our team.    Scribe Attestation      I,:  Uziel Michael PA-C am acting as a scribe while in the presence of the attending physician.:       I,:  Randy Blue MD personally performed the services described in this documentation    as scribed in my presence.:

## 2024-03-29 ENCOUNTER — OFFICE VISIT (OUTPATIENT)
Dept: PHYSICAL THERAPY | Facility: CLINIC | Age: 54
End: 2024-03-29
Payer: COMMERCIAL

## 2024-03-29 DIAGNOSIS — Z98.890 S/P RIGHT ROTATOR CUFF REPAIR: Primary | ICD-10-CM

## 2024-03-29 DIAGNOSIS — M25.511 ACUTE PAIN OF RIGHT SHOULDER: ICD-10-CM

## 2024-03-29 DIAGNOSIS — Z98.890 STATUS POST RIGHT ROTATOR CUFF REPAIR: ICD-10-CM

## 2024-03-29 PROCEDURE — 97110 THERAPEUTIC EXERCISES: CPT | Performed by: PHYSICAL THERAPIST

## 2024-03-29 PROCEDURE — 97140 MANUAL THERAPY 1/> REGIONS: CPT | Performed by: PHYSICAL THERAPIST

## 2024-03-29 PROCEDURE — 97530 THERAPEUTIC ACTIVITIES: CPT | Performed by: PHYSICAL THERAPIST

## 2024-03-29 NOTE — PROGRESS NOTES
PT Re-Evaluation     Today's date: 3/29/2024  Patient name: Christopher Smalls Jr.  : 1970  MRN: [id number]  Referring provider: Uziel Michael PA*  Dx:   Encounter Diagnosis     ICD-10-CM    1. S/P right rotator cuff repair  Z98.890       2. Acute pain of right shoulder  M25.511       3. Status post right rotator cuff repair  Z98.890 Ambulatory Referral to Physical Therapy                       Assessment  Assessment details: Patient is a 54 y.o. male who presents to PT with a history of LBP with RLE radicular symptoms and right rotator cuff tear. He demonstrates a painful and limited AROM of the L/S. Patient demonstrates atrophy of the right quad and muscular hypertonicity of the L/S erector spinae. The RLE is weaker than the LLE. Patient is antalgic with transfers from sit to stand and sit to supine. Patient demonstrates decreased AROM and PROM of the right shoulder as well as poor strength. He was instruction with pendulum exercise as well as gentle L/S AROM for his HEP. He is expected to respond well to PT intervention.     UPDATE 3/29/24  Patient presents to PT following right shoulder cuff repair on 3/13/24. He notes pain and stiffness is present in the shoulder. Patient is compliant with use of the sling. Post-op precautions were reviewed. Patient demonstrates limited PROM of the right shoulder as expected following surgical intervention. Patient will continue treatment for his LBP as well at this time. He continues with pain in the L/S region and into the LE.   Impairments: abnormal or restricted ROM, activity intolerance, impaired physical strength, lacks appropriate home exercise program and pain with function    Symptom irritability: highUnderstanding of Dx/Px/POC: excellent  Goals  STG - 4 weeks  Patient able to demonstrate increased L/S AROM by 25 %.  Patient to report pain at worst 5/10 in the L/S with daily activity.  Patient to be independent with HEP.  Patient able to complete self  dressing tasks of the upper body without assistance following R shld surgery.  Patient able to complete hygiene tasks without assistance.  Patient to have increased right shoulder PROM to 90degrees into flexion.    LTG - 16 weeks  Patient able to demonstrate painfree AROM fo the L/S.  Patient able to demonstrate good posture in sitting and standing.  Patient able to demonstrate proper body mechanics for lifting and carrying items.  Patient to demonstrate core strength at  4+/5 .  Patient able to demonstrate strength of the right shoulder into flexion/ abduction to 4+/5.  Patient to demonstrate increased strength of the RIGHT shoulder into ER to 4+/5.  Patient able to demonstrate RIGHT shoulder AROM to WNL for completion of functional tasks.  Patient to note pain at worst with activity to be 1/10 SPR.  Patient to demonstrate posture in sitting and standing WNL.  Patient to be d/c to final/ revised HEP.     Plan  Plan details: Patient's evaluation is completed. Patient was instructed with a HEP this date. Patient has scheduled further PT sessions for treatment.    Patient would benefit from: PT eval and skilled physical therapy  Planned modality interventions: TENS, thermotherapy: hydrocollator packs, cryotherapy and electrical stimulation/Russian stimulation  Planned therapy interventions: manual therapy, neuromuscular re-education, therapeutic activities, therapeutic exercise and home exercise program  Frequency: 2x week  Duration in weeks: 12  Plan of Care beginning date: 3/29/2024  Plan of Care expiration date: 6/21/2024  Treatment plan discussed with: patient    Subjective Evaluation    History of Present Illness  Mechanism of injury: Patient notes that he has had pain in the back for about 6 months. He notes that he does have radicular RLE - tingling to the foot constant. He notes that sitting is somewhat worse than standing. Knees are painful at times. He notes that sleeping is challenging due to pain in the  back. He is a  - so moves trailer around. He typically work 8-10 hours. Switching from Gabapentin to Lyrica.     Will undergo a R RCR on 3/14/24 with Dr. Blue. He notes he has had pain for over a year. As per- MRI - Complete insertional tear of the supraspinatus, with torn tendon edge retracted to the level of the humeral head apex. Patient is unsure how he injured the shoulder but attributes it to wear and tear.     UPDATE 3/29/24  Patient underwent RCR right 3/13/24. He notes he starting to come out of the sling. He has tried some pendulums. He is using meds to help with pain. He notes he is not able to sleep well at this time in his bed/ using pillows to prop his arm. He will return to see his doctor in . He notes that his back pain is present with radicular symptoms into the RLE.   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 without focal high-grade abnormality)  Treatments  Previous treatment: physical therapy      Objective     Active Range of Motion     Lumbar   Flexion:  with pain Restriction level: moderate  Extension:  with pain Restriction level: maximal  Left lateral flexion:  with pain Restriction level: moderate  Right lateral flexion:  with pain Restriction level: moderate  Left rotation:  with pain Restriction level: moderate  Right rotation:  with pain Restriction level: moderate    Additional Active Range of Motion Details  Tenderness diffusely throughout the lumbar region with palpation to musculature. No point specific pain with palpation.     Passive  "Range of Motion     Right Shoulder   Flexion: 60 degrees with pain  Extension: 40 degrees with pain  Abduction: 45 degrees with pain  External rotation 0°: 10 degrees with pain  Internal rotation 0°: 20 degrees with pain    Strength/Myotome Testing     Left Hip   Planes of Motion   Flexion: 4  Extension: 4  Abduction: 4  Adduction: 4  External rotation: 4  Internal rotation: 4    Right Hip   Planes of Motion   Flexion: 4-  Extension: 4-  Abduction: 4-  Adduction: 4  External rotation: 4  Internal rotation: 4    Left Knee   Flexion: 4+  Extension: 4+    Right Knee   Flexion: 4  Extension: 4    Left Ankle/Foot   Dorsiflexion: 4+  Plantar flexion: 4+    Right Ankle/Foot   Dorsiflexion: 4  Plantar flexion: 4    Additional Strength Details  3/29/24-Strength testing was not completed this date due to precautions following RCR.             Precautions: Right RCR 3/13/24 - see protocol     Daily Treatment Diary:      Initial Evaluation Date: 03/04/24  Compliance 3/4  3/7  3/29                 Visit Number 1 2  3                 Re-Eval  IE   Y -post-op              MC   Foto Captured Y   Y                        3/4  3/7  3/29                 Manual                      L/S STM ->  10 min  R shld 10 min                 HS stretch/ SKTC   30\" x3          R shld PROM 5 min    15 min                 Scap mobilization R                      Ther-Ex                      LTR X10 5\"  5\"x10  5\" x10                 Hip IR stretch X5 10\" 10\"x5  -->                 SKTC c towel  5\"x10 D/C due to shld          TA set   5\" x10                                Pendulums X10 ea    -->                 Ball roll on table     -->                                                                                                                               Neuro Re-Ed                      Scap retractions     Sidely with assist x15                              TA setting  5\"x10 -->          TA with march  x10 -->          TA with SLR  x10 -->     "      Ther-Act              pt ed HEP instruction/ post-op considerations 10 min  post op review 8 min  post-op precautions/ HEP/ expectations/ goals 10 min                                            Modalities                      Prone HP with TENs 10 min Mhp 10 min CP 10 min to shld post

## 2024-04-02 ENCOUNTER — OFFICE VISIT (OUTPATIENT)
Dept: PHYSICAL THERAPY | Facility: CLINIC | Age: 54
End: 2024-04-02
Payer: COMMERCIAL

## 2024-04-02 DIAGNOSIS — M25.511 ACUTE PAIN OF RIGHT SHOULDER: Primary | ICD-10-CM

## 2024-04-02 DIAGNOSIS — M54.16 LUMBAR RADICULOPATHY: ICD-10-CM

## 2024-04-02 DIAGNOSIS — Z98.890 S/P RIGHT ROTATOR CUFF REPAIR: ICD-10-CM

## 2024-04-02 DIAGNOSIS — Z98.890 STATUS POST RIGHT ROTATOR CUFF REPAIR: ICD-10-CM

## 2024-04-02 PROCEDURE — 97140 MANUAL THERAPY 1/> REGIONS: CPT

## 2024-04-02 PROCEDURE — 97530 THERAPEUTIC ACTIVITIES: CPT

## 2024-04-02 PROCEDURE — 97110 THERAPEUTIC EXERCISES: CPT

## 2024-04-02 NOTE — PROGRESS NOTES
"Daily Note     Today's date: 2024  Patient name: Christopher Smalls Jr.  : 1970  MRN: [id number]  Referring provider: Uziel Michael PA*  Dx:   Encounter Diagnosis     ICD-10-CM    1. Acute pain of right shoulder  M25.511       2. S/P right rotator cuff repair  Z98.890       3. Status post right rotator cuff repair  Z98.890       4. Lumbar radiculopathy - Right  M54.16                      Subjective: Patient reports R shoulder is still pretty sore recently and he has still been adjusting to his precautions.       Objective: See treatment diary below      Assessment: Tolerated treatment well without increased pain symptoms. Moderate apprehension and muscle guarding with R shoulder PROM this date. Patient would benefit from continued PT to increase R shoulder ROM/strength as able as well as trunk mobility for improved function in ADLs.      Plan: Continue per plan of care.      Precautions: Right RCR 3/13/24 - see protocol     Daily Treatment Diary:      Initial Evaluation Date: 24  Compliance 3/4  3/7  3/29  4/2               Visit Number 1 2  3  4               Re-Eval  IE   Y -post-op              MC   Foto Captured Y   Y                        3/4  3/7  3/29 4               Manual                      L/S STM ->  10 min  R shld 10 min  R shld 10 min               HS stretch/ SKTC   30\" x3          R shld PROM 5 min    15 min  15m               Scap mobilization R                      Ther-Ex                      LTR X10 5\"  5\"x10  5\" x10  5\"x10               Hip IR stretch X5 10\" 10\"x5  -->                 SKTC c towel  5\"x10 D/C due to shld          TA set   5\" x10                                Pendulums X10 ea    -->  1 min f/b, s/s               Ball roll on table     -->                 Digi        2 min Red               Wrist/elbow AROM       15x ea                                                                                 Neuro Re-Ed                      Scap retractions     " "Sidely with assist x15  x15                            TA setting  5\"x10 --> 5\"x10         TA with march  x10 --> x10         TA with SLR  x10 -->          Ther-Act              pt ed HEP instruction/ post-op considerations 10 min  post op review 8 min  post-op precautions/ HEP/ expectations/ goals 10 min  post-op precautions                                          Modalities                      Prone HP with TENs 10 min Mhp 10 min CP 10 min to shld post CP R shoulder 10 min                                                            "

## 2024-04-04 ENCOUNTER — OFFICE VISIT (OUTPATIENT)
Dept: PHYSICAL THERAPY | Facility: CLINIC | Age: 54
End: 2024-04-04
Payer: COMMERCIAL

## 2024-04-04 DIAGNOSIS — M54.16 LUMBAR RADICULOPATHY: ICD-10-CM

## 2024-04-04 DIAGNOSIS — M25.511 ACUTE PAIN OF RIGHT SHOULDER: Primary | ICD-10-CM

## 2024-04-04 DIAGNOSIS — Z98.890 S/P RIGHT ROTATOR CUFF REPAIR: ICD-10-CM

## 2024-04-04 PROCEDURE — 97110 THERAPEUTIC EXERCISES: CPT

## 2024-04-04 PROCEDURE — 97140 MANUAL THERAPY 1/> REGIONS: CPT

## 2024-04-04 NOTE — PROGRESS NOTES
"Daily Note     Today's date: 2024  Patient name: Christopher Smalls Jr.  : 1970  MRN: [id number]  Referring provider: Uziel Michael PA*  Dx:   Encounter Diagnosis     ICD-10-CM    1. Acute pain of right shoulder  M25.511       2. S/P right rotator cuff repair  Z98.890       3. Lumbar radiculopathy - Right  M54.16           Start Time: 0815  Stop Time: 09  Total time in clinic (min): 49 minutes    Subjective: Patient reports having 6/10 pain in right shoulder this morning.      Objective: See treatment diary below      Assessment: Tolerated treatment well.   Patient participated in skilled PT session focused on strengthening, stretching, and ROM.  Patient able to complete exercise program to protocol with no increase in pain.  Patient continues with R shoulder guarding during PROM.  Patient would continue to benefit from skilled PT interventions to address strengthening, stretching, and ROM. Patient demonstrated fatigue post treatment      Plan: Continue per plan of care.      Precautions: Right RCR 3/13/24 - see protocol     Daily Treatment Diary:      Initial Evaluation Date: 24  Compliance 3/4  3/7  3/29  4/2  4/4             Visit Number 1 2  3  4  5             Re-Eval  IE   Y -post-op              MC   Foto Captured Y   Y                        3/4  3/7  3/29 4/2  4/4             Manual                      L/S STM ->  10 min  R shld 10 min  R shld 10 min  R shld x 10 min             HS stretch/ SKTC   30\" x3          R shld PROM 5 min    15 min  15m  15 mins             Scap mobilization R                      Ther-Ex                      LTR X10 5\"  5\"x10  5\" x10  5\"x10  5\" 10x             Hip IR stretch X5 10\" 10\"x5  -->                 SKTC c towel  5\"x10 D/C due to shld          TA set   5\" x10                                Pendulums X10 ea    -->  1 min f/b, s/s  1 min eaF/B, S/S              Ball roll on table     -->                 Digi        2 min Red  Red x 2 min           " "  Wrist/elbow AROM       15x ea  15x ea                                                                               Neuro Re-Ed                      Scap retractions     Sidely with assist x15  x15  20x                          TA setting  5\"x10 --> 5\"x10 5\" 20x        TA with march  x10 --> x10 10x ea        TA with SLR  x10 -->          Ther-Act              pt ed HEP instruction/ post-op considerations 10 min  post op review 8 min  post-op precautions/ HEP/ expectations/ goals 10 min  post-op precautions                                          Modalities                      Prone HP with TENs 10 min Mhp 10 min CP 10 min to shld post CP R shoulder 10 min                                                              "

## 2024-04-08 DIAGNOSIS — F32.A DEPRESSION: ICD-10-CM

## 2024-04-08 DIAGNOSIS — I10 ESSENTIAL HYPERTENSION: ICD-10-CM

## 2024-04-08 DIAGNOSIS — I25.118 CORONARY ARTERY DISEASE OF NATIVE ARTERY OF NATIVE HEART WITH STABLE ANGINA PECTORIS (HCC): ICD-10-CM

## 2024-04-08 DIAGNOSIS — E78.2 MIXED HYPERLIPIDEMIA: ICD-10-CM

## 2024-04-08 RX ORDER — EZETIMIBE 10 MG/1
10 TABLET ORAL DAILY
Qty: 90 TABLET | Refills: 1 | Status: SHIPPED | OUTPATIENT
Start: 2024-04-08

## 2024-04-08 RX ORDER — METOPROLOL SUCCINATE 25 MG/1
25 TABLET, EXTENDED RELEASE ORAL DAILY
Qty: 90 TABLET | Refills: 1 | Status: SHIPPED | OUTPATIENT
Start: 2024-04-08

## 2024-04-08 RX ORDER — AMLODIPINE BESYLATE 5 MG/1
5 TABLET ORAL DAILY
Qty: 90 TABLET | Refills: 1 | Status: SHIPPED | OUTPATIENT
Start: 2024-04-08

## 2024-04-08 RX ORDER — ATORVASTATIN CALCIUM 80 MG/1
80 TABLET, FILM COATED ORAL DAILY
Qty: 90 TABLET | Refills: 1 | Status: SHIPPED | OUTPATIENT
Start: 2024-04-08

## 2024-04-08 RX ORDER — DULOXETIN HYDROCHLORIDE 60 MG/1
60 CAPSULE, DELAYED RELEASE ORAL DAILY
Qty: 90 CAPSULE | Refills: 1 | Status: SHIPPED | OUTPATIENT
Start: 2024-04-08

## 2024-04-08 NOTE — PATIENT INSTRUCTIONS
Patient Instructions:    Additional Testing  -I am recommending further Neurodiagnostic workup at this time:  MRI Brain ordered     Headache/migraine treatment:     Prevention  -To take every day to help prevent headaches - not to take at the time of headache:  -Start amitriptyline please let me know if you have any side effects otherwise try to give it 1-2 weeks to see if it's effective.  -Over the counter preventive supplements for headaches/migraines (if you try, try for 3 months straight):  -Magnesium 400mg daily (If any diarrhea or upset stomach, decrease dose as tolerated)  -Riboflavin (Vitamin B2) 400mg daily (may make your urine bright/neon yellow)  - All supplements can be purchased online    Abortive  -For immediate treatment of a headache/migraine  -For your more moderate to severe migraines take this medication as early as possible:  -Stop imitrex 50mg and start Imitrex 100mg at the earliest onset of a headache.  May repeat one time in 2 hours if not completely headache free.    -It is ok to take tylenol if it helps your headaches you should limit these to no more than 3 times a week to avoid medication overuse/rebound headaches.     Lifestyle Recommendations:    -Remain well-hydrated drinking at least 48 to 64 ounces of noncaffeinated beverages per day in addition to anything caffeinated.    -Getting adequate rest is also very important for migraine prevention (aim for 7-8 hours per night).     -Regular exercise is also beneficial for headache prevention.  I would encourage at the least 5 days of physical exercise weekly for at least 30 minutes.   -I would like for them to keep track of their migraines using an application on their phone or calendar as they see fit. Phone applications: Migraine Stephan or Migraine Diary.    TIA:  - For ongoing stroke/TIA prevention continue: Lipitor and Zetia.  Start ASA 81mg daily.  - Discussed the importance of antiplatelet/anticoagulant management with the patient to  prevent future strokes.   - Recommend to check blood pressure occasionally away from the doctor's office to make sure that those numbers are typically less than 130/80.  If they are frequently higher than that, we recommend checking a little more often and to follow up with primary care team.  -Continue amlodipine and metoprolol as prescribed be your primary care team/cardiologist.   - Will defer to primary care team for monitoring of cholesterol panel and blood sugar numbers with target LDL cholesterol of less than 70 and hemoglobin A1c less than 7%.  Patient aware most recent A1C is in the diabetic range, so he is going to discuss with his PCP  - Recommend following a low salt, mediterranean diet.   - Recommend routine physical exercise as tolerated.  - Avoid using NSAIDs (like ibuprofen, motrin, or aleve) for headaches or other pain and to use tylenol if needed.     Education and Follow-up:    -Please call or send a [employer] message with any questions or concerns. Please present to the emergency room with any concerning symptoms such as: worst headache of your life, sudden painless loss of vision or double vision, difficulty speaking or swallowing, vertigo/room spinning that does not quickly resolve, or weakness/numbness/loss of coordination affecting 1 side of the face or body.  -Follow up in 4 months or sooner if needed.
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
## 2024-04-09 ENCOUNTER — CONSULT (OUTPATIENT)
Dept: NEUROLOGY | Facility: CLINIC | Age: 54
End: 2024-04-09

## 2024-04-09 ENCOUNTER — OFFICE VISIT (OUTPATIENT)
Dept: PHYSICAL THERAPY | Facility: CLINIC | Age: 54
End: 2024-04-09
Payer: COMMERCIAL

## 2024-04-09 VITALS
SYSTOLIC BLOOD PRESSURE: 130 MMHG | TEMPERATURE: 97.6 F | BODY MASS INDEX: 33.68 KG/M2 | HEART RATE: 72 BPM | DIASTOLIC BLOOD PRESSURE: 80 MMHG | WEIGHT: 214.6 LBS | HEIGHT: 67 IN

## 2024-04-09 DIAGNOSIS — K21.9 GERD (GASTROESOPHAGEAL REFLUX DISEASE): ICD-10-CM

## 2024-04-09 DIAGNOSIS — R51.9 CHRONIC INTRACTABLE HEADACHE, UNSPECIFIED HEADACHE TYPE: ICD-10-CM

## 2024-04-09 DIAGNOSIS — G89.29 CHRONIC INTRACTABLE HEADACHE, UNSPECIFIED HEADACHE TYPE: ICD-10-CM

## 2024-04-09 DIAGNOSIS — M25.511 ACUTE PAIN OF RIGHT SHOULDER: Primary | ICD-10-CM

## 2024-04-09 DIAGNOSIS — E78.2 MIXED HYPERLIPIDEMIA: ICD-10-CM

## 2024-04-09 DIAGNOSIS — G45.9 TIA (TRANSIENT ISCHEMIC ATTACK): ICD-10-CM

## 2024-04-09 DIAGNOSIS — G43.109 MIGRAINE WITH AURA AND WITHOUT STATUS MIGRAINOSUS, NOT INTRACTABLE: Primary | ICD-10-CM

## 2024-04-09 DIAGNOSIS — M54.16 LUMBAR RADICULOPATHY: ICD-10-CM

## 2024-04-09 DIAGNOSIS — I10 ESSENTIAL HYPERTENSION: ICD-10-CM

## 2024-04-09 DIAGNOSIS — Z98.890 STATUS POST RIGHT ROTATOR CUFF REPAIR: ICD-10-CM

## 2024-04-09 DIAGNOSIS — R73.03 PREDIABETES: ICD-10-CM

## 2024-04-09 DIAGNOSIS — Z98.890 S/P RIGHT ROTATOR CUFF REPAIR: ICD-10-CM

## 2024-04-09 PROCEDURE — 97140 MANUAL THERAPY 1/> REGIONS: CPT | Performed by: PHYSICAL THERAPIST

## 2024-04-09 PROCEDURE — 97110 THERAPEUTIC EXERCISES: CPT | Performed by: PHYSICAL THERAPIST

## 2024-04-09 PROCEDURE — 97112 NEUROMUSCULAR REEDUCATION: CPT | Performed by: PHYSICAL THERAPIST

## 2024-04-09 RX ORDER — ASPIRIN 81 MG/1
81 TABLET, CHEWABLE ORAL DAILY
Qty: 30 TABLET | Refills: 3 | Status: SHIPPED | OUTPATIENT
Start: 2024-04-09 | End: 2024-08-07

## 2024-04-09 RX ORDER — OMEPRAZOLE 20 MG/1
20 CAPSULE, DELAYED RELEASE ORAL DAILY
Qty: 90 CAPSULE | Refills: 1 | Status: SHIPPED | OUTPATIENT
Start: 2024-04-09

## 2024-04-09 RX ORDER — AMITRIPTYLINE HYDROCHLORIDE 25 MG/1
TABLET, FILM COATED ORAL
Qty: 60 TABLET | Refills: 3 | Status: SHIPPED | OUTPATIENT
Start: 2024-04-09

## 2024-04-09 RX ORDER — SUMATRIPTAN 100 MG/1
100 TABLET, FILM COATED ORAL AS NEEDED
Qty: 16 TABLET | Refills: 3 | Status: SHIPPED | OUTPATIENT
Start: 2024-04-09

## 2024-04-09 NOTE — PROGRESS NOTES
"Daily Note     Today's date: 2024  Patient name: Christopher Smalls Jr.  : 1970  MRN: [id number]  Referring provider: Uziel Michael PA*  Dx:   Encounter Diagnosis     ICD-10-CM    1. Acute pain of right shoulder  M25.511       2. S/P right rotator cuff repair  Z98.890       3. Lumbar radiculopathy - Right  M54.16       4. Status post right rotator cuff repair  Z98.890                      Subjective: Patient notes decreasing pain complaints in the right shoulder and the L/S region. He notes compliance with his HEP.      Objective: See treatment diary below      Assessment: Tolerated treatment well. Patient would benefit from continued PT. Patient able to progress to sub max RC isometrics this date with minor complaint after abduction iso. Patient is progressing with PROM at this time. Able to progress to use of the Nu-step without issue. Will progress into the 4-6 week phase of protocol 24.      Plan: Continue per plan of care.      Precautions: Right RCR 3/13/24 - see protocol     Daily Treatment Diary:      Initial Evaluation Date: 24  Compliance 3/4  3/7  3/29  4/2  4/4  4/9           Visit Number 1 2  3  4  5  6           Re-Eval  IE   Y -post-op              MC   Foto Captured Y   Y                        3/4  3/7  3/29 4/2  4/4  4/9           Manual                      L/S STM ->  10 min  R shld 10 min  R shld 10 min  R shld x 10 min  R shld 5 min           HS stretch/ SKTC   30\" x3          R shld PROM 5 min    15 min  15m  15 mins  15 min           Scap mobilization R                      Ther-Ex                      LTR X10 5\"  5\"x10  5\" x10  5\"x10  5\" 10x  5\" x10           Hip IR stretch X5 10\" 10\"x5  -->                 SKTC c towel  5\"x10 D/C due to shld          TA set   5\" x10          Sub max RC iso - IR/ER/Ext/add/ abd           3\" x10 ea           Pendulums X10 ea    -->  1 min f/b, s/s  1 min eaF/B, S/S   1 min F/B, S/S           Ball roll on table     -->      1 min fwd   " "        Digi        2 min Red  Red x 2 min  black           Wrist/elbow AROM       15x ea  15x ea  PRE sup/ pro, flex/ ext 2# x20           pulley           --> -->          Wand- flex/abd/ ER           --> -->          Nu-Step (no arms)           10 min           Neuro Re-Ed                      Scap retractions     Sidely with assist x15  x15  20x  20x                        TA setting  5\"x10 --> 5\"x10 5\" 20x 5\" x20       TA with march  x10 --> x10 10x ea x10       TA with bridge      -->       TA with walk out      -->       TA with SLR  x10 -->   x10       Ther-Act              pt ed HEP instruction/ post-op considerations 10 min  post op review 8 min  post-op precautions/ HEP/ expectations/ goals 10 min  post-op precautions                                          Modalities                      Prone HP with TENs 10 min Mhp 10 min CP 10 min to shld post CP R shoulder 10 min  HP to L/S 10 min                                                              "

## 2024-04-09 NOTE — ASSESSMENT & PLAN NOTE
He reports having headaches about 3 days per week at this point in time.  They start in the left frontal and occipital regions but can also travel to his neck bilaterally.  He does report some visual disturbances right before getting the head pain.  His family doctor has given him Imitrex to take for the bad headaches but he only uses it at night since it makes him sleepy.  He is a  by trade.  He will often use over-the-counter medication during the daytime like Tylenol or ibuprofen which helps take the edge off.  Workup:  Neuro exam in the office was reassuring, however, due to increased frequency and severity of headaches and migraines I recommend further evaluation with MRI brain without contrast to rule out structural or treatable causes of symptoms  Preventative:  We discussed headache hygiene and lifestyle factors that may improve headaches  Start Amitriptyline 25mg nightly for one week. May increase to 50mg nightly after that if tolerating well without side effects. Patient aware this may also help with his mood as well  Currently on through other providers: metoprolol, Lyrica  Past/ failed/contraindicated: none  Future options: Topamax, CGRP med, botox  Acute:  Discussed not taking over-the-counter or prescription pain medications more than 3 days per week to prevent medication overuse/rebound headache  Imitrex has been helping the patient but doesn't always take away the headache completely.  Recommended stopping Imitrex 50mg and starting Imitrex 100mg at the earliest onset of a migraine or aura.  May repeat one time in 2 hours if not completely headache free.  He may wish to combine this with 2 extra strength tylenol but should not repeat the tylenol for 8 hours  Future options:  Other triptan (Maxalt), ubrelvy, reyvow, nurtec

## 2024-04-09 NOTE — PROGRESS NOTES
Review of Systems   Constitutional:  Negative for appetite change, fatigue and fever.   HENT: Negative.  Negative for hearing loss, tinnitus, trouble swallowing and voice change.    Eyes: Negative.  Negative for photophobia, pain and visual disturbance.   Respiratory: Negative.  Negative for shortness of breath.    Cardiovascular: Negative.  Negative for palpitations.   Gastrointestinal: Negative.  Negative for nausea and vomiting.   Endocrine: Negative.  Negative for cold intolerance.   Genitourinary: Negative.  Negative for dysuria, frequency and urgency.   Musculoskeletal:  Negative for back pain, gait problem, myalgias, neck pain and neck stiffness.   Skin: Negative.  Negative for rash.   Allergic/Immunologic: Negative.    Neurological:  Positive for headaches. Negative for dizziness, tremors, seizures, syncope, facial asymmetry, speech difficulty, weakness, light-headedness and numbness.        Pt reports headaches are currently 3 days out of the week.    Hematological: Negative.  Does not bruise/bleed easily.   Psychiatric/Behavioral: Negative.  Negative for confusion, hallucinations and sleep disturbance.    All other systems reviewed and are negative.

## 2024-04-09 NOTE — ASSESSMENT & PLAN NOTE
From TIA standpoint, patient reports that he is doing well and denies any new strokelike symptoms.  He has not been taking any AC/AP.  But he has been taking Zetia and Lipitor and denies any side effects or muscle cramping with these medications.   For ongoing stroke/TIA prevention continue: Zetia and Lipitor daily.    Start ASA 81mg daily  Recommend to check blood pressure occasionally away from the doctor's office to make sure that those numbers are typically less than 130/80.  If they are frequently higher than that, we recommend checking it more frequently and following up with primary care team/cardiologist   Continue all medications for blood pressure as prescribed by your primary care team/cardiologist  Will defer to primary care team for monitoring of cholesterol panel and blood sugar numbers with target LDL cholesterol of less than 70 and hemoglobin A1c less than 7%  Recommend following a low salt, mediterranean diet   Recommend routine physical exercise as tolerated   Avoid using NSAIDs for headaches or other pain and to use tylenol if needed

## 2024-04-09 NOTE — ASSESSMENT & PLAN NOTE
Most recent A1C 3/5/24 was 6.6.  Patient aware this is in the diabetic range.  He was encouraged to discuss this with his PCP.    For secondary stroke prevention, recommending A1C <7, which patient currently is

## 2024-04-10 ENCOUNTER — TELEPHONE (OUTPATIENT)
Dept: NEUROLOGY | Facility: CLINIC | Age: 54
End: 2024-04-10

## 2024-04-10 NOTE — TELEPHONE ENCOUNTER
----- Message from ROHAN Astorga sent at 4/10/2024 10:06 AM EDT -----  Regarding: aspirin  Good morning,     Would you be able to contact this patient that I saw in the office yesterday and let him know that because of his history of TIA, I would like him to take an 81mg aspirin daily.  He should keep an eye out for excessive bleeding or bruising. I sent a script to his pharmacy but not sure if his insurance will cover it? If not, he can just purchase it over the counter.     Please let me know if he has any questions.

## 2024-04-11 ENCOUNTER — OFFICE VISIT (OUTPATIENT)
Dept: PHYSICAL THERAPY | Facility: CLINIC | Age: 54
End: 2024-04-11
Payer: COMMERCIAL

## 2024-04-11 DIAGNOSIS — M25.511 ACUTE PAIN OF RIGHT SHOULDER: Primary | ICD-10-CM

## 2024-04-11 DIAGNOSIS — M54.16 LUMBAR RADICULOPATHY: ICD-10-CM

## 2024-04-11 DIAGNOSIS — Z98.890 STATUS POST RIGHT ROTATOR CUFF REPAIR: ICD-10-CM

## 2024-04-11 DIAGNOSIS — Z98.890 S/P RIGHT ROTATOR CUFF REPAIR: ICD-10-CM

## 2024-04-11 PROCEDURE — 97112 NEUROMUSCULAR REEDUCATION: CPT

## 2024-04-11 PROCEDURE — 97110 THERAPEUTIC EXERCISES: CPT

## 2024-04-11 PROCEDURE — 97140 MANUAL THERAPY 1/> REGIONS: CPT

## 2024-04-11 NOTE — PROGRESS NOTES
"Daily Note     Today's date: 2024  Patient name: Christopher Smalls Jr.  : 1970  MRN: [id number]  Referring provider: Uziel Michael PA*  Dx:   Encounter Diagnosis     ICD-10-CM    1. Acute pain of right shoulder  M25.511       2. S/P right rotator cuff repair  Z98.890       3. Lumbar radiculopathy - Right  M54.16       4. Status post right rotator cuff repair  Z98.890                      Subjective: Patient reports his back was sore and \"spasm-y\" yesterday. He denies issue with R shoulder lately.      Objective: See treatment diary below      Assessment: Tolerated treatment well without complaint. Patient demonstrates moderate verbal cues to perform exercises with appropriate technique and intensity. Patient would benefit from continued PT to increase R shoulder ROM and strength as able per protocol       Plan: Continue per plan of care.      Precautions: Right RCR 3/13/24 - see protocol     Daily Treatment Diary:      Initial Evaluation Date: 24  Compliance 3/4  3/7  3/29  4/2  4/4  4/9  4/11         Visit Number 1 2  3  4  5  6  7         Re-Eval  IE   Y -post-op              MC   Foto Captured Y   Y                        3/4  3/7  3/29 4/2  4/4  4/9  4/11         Manual                      L/S STM ->  10 min  R shld 10 min  R shld 10 min  R shld x 10 min  R shld 5 min  R shoulder 5 min         HS stretch/ SKTC   30\" x3          R shld PROM 5 min    15 min  15m  15 mins  15 min  15 min         Scap mobilization R                      Ther-Ex                      LTR X10 5\"  5\"x10  5\" x10  5\"x10  5\" 10x  5\" x10  5\"x10         Hip IR stretch X5 10\" 10\"x5  -->                 SKTC c towel  5\"x10 D/C due to shld          TA set   5\" x10          Sub max RC iso - IR/ER/Ext/add/ abd           3\" x10 ea  3\"X10 ea         Pendulums X10 ea    -->  1 min f/b, s/s  1 min eaF/B, S/S   1 min F/B, S/S  1 min F/B, S/S         Ball roll on table     -->      1 min fwd  fwd 2 min// scap 1 min         Digi " "       2 min Red  Red x 2 min  black  black 2 min         Wrist/elbow AROM       15x ea  15x ea  PRE sup/ pro, flex/ ext 2# x20 PRE sup/ pro, flex/ ext 2# x20         pulley           --> -->          Wand- flex/abd/ ER           --> -->          Nu-Step (no arms)           10 min  10 min L2         Neuro Re-Ed                      Scap retractions     Sidely with assist x15  x15  20x  20x  20x                      TA setting  5\"x10 --> 5\"x10 5\" 20x 5\" x20 5\"x20      TA with march  x10 --> x10 10x ea x10 2x10      TA with bridge      --> x10      TA with walk out      -->       TA with SLR  x10 -->   x10 x10      Ther-Act              pt ed HEP instruction/ post-op considerations 10 min  post op review 8 min  post-op precautions/ HEP/ expectations/ goals 10 min  post-op precautions                                          Modalities                      Prone HP with TENs 10 min Mhp 10 min CP 10 min to shld post CP R shoulder 10 min  HP to L/S 10 min                                                                " Melolabial Interpolation Flap Text: A decision was made to reconstruct the defect utilizing an interpolation axial flap and a staged reconstruction.  A telfa template was made of the defect.  This telfa template was then used to outline the melolabial interpolation flap.  The donor area for the pedicle flap was then injected with anesthesia.  The flap was excised through the skin and subcutaneous tissue down to the layer of the underlying musculature.  The pedicle flap was carefully excised within this deep plane to maintain its blood supply.  The edges of the donor site were undermined.   The donor site was closed in a primary fashion.  The pedicle was then rotated into position and sutured.  Once the tube was sutured into place, adequate blood supply was confirmed with blanching and refill.  The pedicle was then wrapped with xeroform gauze and dressed appropriately with a telfa and gauze bandage to ensure continued blood supply and protect the attached pedicle.

## 2024-04-15 ENCOUNTER — OFFICE VISIT (OUTPATIENT)
Dept: FAMILY MEDICINE CLINIC | Facility: CLINIC | Age: 54
End: 2024-04-15
Payer: COMMERCIAL

## 2024-04-15 ENCOUNTER — OFFICE VISIT (OUTPATIENT)
Dept: PHYSICAL THERAPY | Facility: CLINIC | Age: 54
End: 2024-04-15
Payer: COMMERCIAL

## 2024-04-15 VITALS
WEIGHT: 216.27 LBS | DIASTOLIC BLOOD PRESSURE: 79 MMHG | HEART RATE: 83 BPM | OXYGEN SATURATION: 97 % | SYSTOLIC BLOOD PRESSURE: 132 MMHG | BODY MASS INDEX: 33.87 KG/M2

## 2024-04-15 DIAGNOSIS — R73.03 PREDIABETES: ICD-10-CM

## 2024-04-15 DIAGNOSIS — E11.9 TYPE 2 DIABETES MELLITUS WITHOUT COMPLICATION, WITHOUT LONG-TERM CURRENT USE OF INSULIN (HCC): ICD-10-CM

## 2024-04-15 DIAGNOSIS — F17.211 NICOTINE DEPENDENCE, CIGARETTES, IN REMISSION: ICD-10-CM

## 2024-04-15 DIAGNOSIS — Z87.891 HISTORY OF TOBACCO USE DISORDER: ICD-10-CM

## 2024-04-15 DIAGNOSIS — M54.16 LUMBAR RADICULOPATHY: ICD-10-CM

## 2024-04-15 DIAGNOSIS — I25.118 CORONARY ARTERY DISEASE OF NATIVE ARTERY OF NATIVE HEART WITH STABLE ANGINA PECTORIS (HCC): ICD-10-CM

## 2024-04-15 DIAGNOSIS — Z00.00 ANNUAL PHYSICAL EXAM: Primary | ICD-10-CM

## 2024-04-15 DIAGNOSIS — Z12.11 SCREENING FOR COLON CANCER: ICD-10-CM

## 2024-04-15 DIAGNOSIS — Z98.890 S/P RIGHT ROTATOR CUFF REPAIR: ICD-10-CM

## 2024-04-15 DIAGNOSIS — M25.511 ACUTE PAIN OF RIGHT SHOULDER: Primary | ICD-10-CM

## 2024-04-15 DIAGNOSIS — I10 ESSENTIAL HYPERTENSION: ICD-10-CM

## 2024-04-15 DIAGNOSIS — Z98.890 STATUS POST RIGHT ROTATOR CUFF REPAIR: ICD-10-CM

## 2024-04-15 DIAGNOSIS — E78.2 MIXED HYPERLIPIDEMIA: ICD-10-CM

## 2024-04-15 PROCEDURE — 99214 OFFICE O/P EST MOD 30 MIN: CPT | Performed by: FAMILY MEDICINE

## 2024-04-15 PROCEDURE — 99396 PREV VISIT EST AGE 40-64: CPT | Performed by: FAMILY MEDICINE

## 2024-04-15 PROCEDURE — 97110 THERAPEUTIC EXERCISES: CPT | Performed by: PHYSICAL THERAPIST

## 2024-04-15 PROCEDURE — 97140 MANUAL THERAPY 1/> REGIONS: CPT | Performed by: PHYSICAL THERAPIST

## 2024-04-15 PROCEDURE — 97112 NEUROMUSCULAR REEDUCATION: CPT | Performed by: PHYSICAL THERAPIST

## 2024-04-15 RX ORDER — METFORMIN HYDROCHLORIDE 500 MG/1
500 TABLET, EXTENDED RELEASE ORAL
Qty: 90 TABLET | Refills: 0 | Status: SHIPPED | OUTPATIENT
Start: 2024-04-15

## 2024-04-15 NOTE — ASSESSMENT & PLAN NOTE
Echo 3/2024: EF 56%, mild LV wall thickness, mod AR, mild TR, mild MR  Now est with [facility]  Continue asa, atorvastatin, metoprolol, zetia, amlodipine  Mgmt of new onset T2DM as below - will likely add ACEi at follow up  F/u 3mo

## 2024-04-15 NOTE — PATIENT INSTRUCTIONS
Type 2 Diabetes Management for Adults   AMBULATORY CARE:   Type 2 diabetes  is a disease that affects how your body uses glucose (sugar). Either your body cannot make enough insulin, or it cannot use the insulin correctly. It is important to keep diabetes controlled to prevent damage to your heart, blood vessels, and other organs. Management will help you feel well and enjoy your daily activities. Your diabetes care team providers can help you make a plan to fit diabetes care into your schedule. Your plan can change over time to fit your needs and your family's needs.       Have someone call your local emergency number (911 in the [place]) if:   You cannot be woken.    You have signs of diabetic ketoacidosis:     confusion, fatigue    vomiting    rapid heartbeat    fruity smelling breath    extreme thirst    dry mouth and skin    You have any of the following signs of a heart attack:      Squeezing, pressure, or pain in your chest    You may  also have any of the following:     Discomfort or pain in your back, neck, jaw, stomach, or arm    Shortness of breath    Nausea or vomiting    Lightheadedness or a sudden cold sweat    You have any of the following signs of a stroke:      Numbness or drooping on one side of your face     Weakness in an arm or leg    Confusion or difficulty speaking    Dizziness, a severe headache, or vision loss    Call your doctor or diabetes care team provider if:   You have a sore or wound that will not heal.    You have a change in the amount you urinate.    Your blood sugar levels are higher than your target goals.    You often have lower blood sugar levels than your target goals.    Your skin is red, dry, warm, or swollen.    You have trouble coping with diabetes, or you feel anxious or depressed.    You have trouble following any part of your care plan, such as your meal plan.    You have questions or concerns about your condition or care.    What you need to know about high blood sugar  levels:  High blood sugar levels may not cause any symptoms. You may feel more thirsty or urinate more often than usual. Over time, high blood sugar levels can damage your nerves, blood vessels, tissues, and organs. The following can increase your blood sugar levels:  Large meals or large amounts of carbohydrates at one time    Less physical activity    Stress    Illness    A lower dose of diabetes medicine or insulin, or a late dose    What you need to know about low blood sugar levels:  Symptoms include feeling shaky, dizzy, irritable, or confused. You can prevent symptoms by keeping your blood sugar levels from going too low.  Treat a low blood sugar level right away:      Drink 4 ounces of juice or have 1 tube of glucose gel.    Check your blood sugar level again 10 to 15 minutes later.    When the level goes back to normal, eat a meal or snack to prevent another decrease.       Keep glucose gel, raisins, or hard candy with you at all times to treat a low blood sugar level.     Your blood sugar level can get too low if you take diabetes medicine or insulin and do not eat enough food.     If you use insulin, check your blood sugar level before you exercise.      If your blood sugar level is below 100 mg/dL, eat 4 crackers or 2 ounces of raisins, or drink 4 ounces of juice.    Check your level every 30 minutes if you exercise longer than 1 hour.    You may need a snack during or after exercise.    What you can do to manage your blood sugar levels:   Check your blood sugar levels as directed and as needed.  Several items are available to use to check your levels. You may need to check by testing a drop of blood in a glucose monitor. You may instead be given a continuous glucose monitoring (CGM) device. The device is worn at all times. The CGM checks your blood sugar level every 5 minutes. It sends results to an electronic device such as a smart phone. A CGM can be used with or without an insulin pump. You and your  diabetes care team providers will decide on the best method for you. The goal for blood sugar levels before meals  is between 80 and 130 mg/dL and 2 hours after eating  is lower than 180 mg/dL.            Make healthy food choices.  Work with a dietitian to create a meal plan that works for you and your schedule. A dietitian can help you learn how to eat the right amount of carbohydrates (sugar and starchy foods) during your meals and snacks. Examples of carbohydrates are breads, cereals, rice, pasta, fruit, low-fat dairy, and sweets. Carbohydrates can raise your blood sugar level if you eat too many at one time.         Eat high-fiber foods as directed.  Fiber helps improve blood sugar levels. Fiber also lowers your risk for heart disease and other problems diabetes can cause. Examples of high-fiber foods include vegetables, whole-grain bread, and beans such as roth beans. Your dietitian can tell you how much fiber to have each day.         Get regular physical activity.  Physical activity can help you get to your target blood sugar level goal and manage your weight. Get at least 150 minutes of moderate to vigorous aerobic physical activity each week. Resistance training, such as lifting weights, should be done 3 times each week. Do not miss more than 2 days of physical activity in a row. Do not sit longer than 30 minutes at a time. Your healthcare provider can help you create an activity plan. The plan can include the best activities for you and can help you build your strength and endurance.            Maintain a healthy weight.  Ask your team what a healthy weight is for you. A healthy weight can help you control diabetes and prevent heart disease. Ask your team to help you create a weight-loss plan, if needed. Even a loss of 3% to 7% of your excess body weight can help make a difference in managing diabetes. Your team will help you set a weight-loss goal, such as 10 to 15 pounds, or 5% of your extra weight.  Together you and your team can set manageable weight-loss goals.    Take your diabetes medicine or insulin as directed.  You may need diabetes medicine, insulin, or both to help control your blood sugar levels. Your healthcare provider will teach you how and when to take your diabetes medicine or insulin. You will also be taught about side effects oral diabetes medicine can cause. Insulin may be injected or given through a pump or pen. You and your providers will decide on the best method for you:    An insulin pump  is an implanted device that gives your insulin 24 hours a day. An insulin pump prevents the need for multiple insulin injections in a day.         An insulin pen  is a device prefilled with the right amount of insulin.         You and your family members will be taught how to draw up and give insulin  if this is the best method for you. Your providers will also teach you how to dispose of needles and syringes.    You will learn how much insulin you need  and when to give it. You will be taught when not to give insulin. You will also be taught what to do if your blood sugar level drops too low. This may happen if you take insulin and do not eat the right amount of carbohydrates.    More ways to manage type 2 diabetes:   Wear medical alert identification.  Wear medical alert jewelry or carry a card that says you have diabetes. Ask your provider where to get these items.         Do not smoke.  Nicotine and other chemicals in cigarettes and cigars can cause lung and blood vessel damage. It also makes it more difficult to manage your diabetes. Ask your provider for information if you currently smoke and need help to quit. Do not use e-cigarettes or smokeless tobacco in place of cigarettes or to help you quit. They still contain nicotine.    Check your feet each day for cuts, scratches, calluses, or other wounds.  Look for redness and swelling, and feel for warmth. Wear shoes that fit well. Check your shoes  for rocks or other objects that can hurt your feet. Do not walk barefoot or wear shoes without socks. Wear cotton socks to help keep your feet dry.         Ask about vaccines you may need.  You have a higher risk for serious illness if you get the flu, pneumonia, COVID-19, or hepatitis. Ask your provider if you should get vaccines to prevent these or other diseases, and when to get the vaccines.    Talk to your provider if you become stressed about diabetes care.  Sometimes being able to fit diabetes care into your life can cause increased stress. The stress can cause you not to take care of yourself properly. Your provider can help by offering tips about self-care. A mental health provider can listen and offer help with self-care issues. Other types of counseling can help you make nutrition or physical activity changes.    Have your A1c checked as directed.  Your provider may check your A1c every 3 months, or 2 times each year if your diabetes is controlled. An A1c test shows the average amount of sugar in your blood over the past 2 to 3 months. Your provider will tell you what your A1c level should be.    Have screening tests as directed.  Your provider may recommend screening for complications of diabetes and other conditions that may develop. Some screenings may begin right away and some may happen within the first 5 years of diagnosis:    Examples of diabetes complications  include kidney problems, high cholesterol, high blood pressure, blood vessel problems, eye problems, and sleep apnea.    You may be screened for a low vitamin B level  if you take oral diabetes medicine for a long time.    You may be screened for polycystic ovarian syndrome (PCOS)  if you are of childbearing age.    Follow up with your doctor or diabetes care team providers as directed:  You may need to have blood tests done before your follow-up visit. The test results will show if changes need to be made in your treatment or self-care.  Talk to your provider if you cannot afford your medicine. Write down your questions so you remember to ask them during your visits.  © Copyright [employer] 2023 Information is for End User's use only and may not be sold, redistributed or otherwise used for commercial purposes.  The above information is an  only. It is not intended as medical advice for individual conditions or treatments. Talk to your doctor, nurse or pharmacist before following any medical regimen to see if it is safe and effective for you.    Type 2 Diabetes in Adults: New Diagnosis   AMBULATORY CARE:   Type 2 diabetes  is a disease that affects how your body uses glucose (sugar). Normally, when the blood sugar level increases, the pancreas makes more insulin. Insulin helps move sugar out of the blood so it can be used for energy. Type 2 diabetes develops because either the body cannot make enough insulin, or it cannot use the insulin correctly. Type 2 diabetes can be controlled to prevent damage to your heart, blood vessels, and other organs.       Common symptoms include the following:   Numbness in your fingers or toes    Blurred vision    Urinating often    More hunger or thirst than usual    Have someone call your local emergency number (911 in the [place]) if:   You have any of the following signs of a stroke:      Numbness or drooping on one side of your face     Weakness in an arm or leg    Confusion or difficulty speaking    Dizziness, a severe headache, or vision loss    You have any of the following signs of a heart attack:      Squeezing, pressure, or pain in your chest    You may  also have any of the following:     Discomfort or pain in your back, neck, jaw, stomach, or arm    Shortness of breath    Nausea or vomiting    Lightheadedness or a sudden cold sweat    You have trouble breathing.    Seek care immediately if:   You have severe abdominal pain.    You vomit for more than 2 hours.    You have trouble staying awake or  focusing.    You are shaking or sweating.    You feel weak or more tired than usual.    You have blurred or double vision.    Your breath has a fruity, sweet smell.    Call your doctor or diabetes care team provider if:   Your arms and legs are swollen.    You have an upset stomach and cannot eat the foods on your meal plan.    You feel dizzy, have headaches, or are easily irritated.    Your skin is red, warm, dry, or swollen.    You have a wound that does not heal.    You have numbness in your arms or legs.    You have trouble coping with your illness, or you feel anxious or depressed.    You have trouble following any part of your care plan, such as your meal plan.    You have questions or concerns about your condition or care.    Treatment for type 2 diabetes  helps prevent or delay complications, including heart and kidney disease. You must eat healthy meals and do regular physical activity. Your diabetes providers may ask about your home life so they can create a care plan that works best for you. Your plan may  include any of the following:  Diabetes medicines or insulin  may be given to decrease the amount of sugar in your blood.    Blood pressure medicine  may be given to lower your blood pressure.    Cholesterol-lowering medicine  may be given to prevent heart disease.    Antiplatelets , such as aspirin, help prevent blood clots. Take your antiplatelet medicine exactly as directed. These medicines make it more likely for you to bleed or bruise. If you are told to take aspirin, do not take acetaminophen or ibuprofen instead.    Take your medicine as directed.  Contact your healthcare provider if you think your medicine is not helping or if you have side effects. Tell your provider if you are allergic to any medicine. Keep a list of the medicines, vitamins, and herbs you take. Include the amounts, and when and why you take them. Bring the list or the pill bottles to follow-up visits. Carry your medicine list  with you in case of an emergency.    Tests  may be used to check for type 2 diabetes starting at age 35 or sooner if you have 1 or more risk factors. Any of the following may be used to diagnose diabetes or check that it is well controlled:  An A1c test  shows the average amount of sugar in your blood over the past 2 to 3 months. Your diabetes care team provider will tell you the A1c level that is right for you.    A fasting plasma glucose test  is when your blood sugar level is tested after you have not eaten for 8 hours.    A 2-hour plasma glucose test  starts with a blood sugar level check after you have not eaten for 8 hours. You are then given a glucose drink. Your blood sugar level is checked after 2 hours.    A random glucose test  may be done any time of day, no matter how long ago you ate.    Diabetes education:  Diabetes education will start right away. Diabetes education may also happen later to refresh your memory. Your diabetes care team may include physicians, nurse practitioners, community health providers, and physician assistants. It may also include nurses, dietitians, exercise specialists, pharmacists, dentists, and podiatrists. Family members, or others who are close to you, may also be part of the team. You and your team will make goals and plans to manage diabetes and other health problems. The plans and goals will be specific to your needs. Members of your diabetes care team will teach you the following:  About nutrition:  A dietitian will help you make a meal plan to keep your blood sugar level steady. You will learn how food affects your blood sugar levels. You will also learn to keep track of carbohydrates (sugar and starchy foods). Do not skip meals. Your blood sugar level may drop too low if you have taken diabetes medicine and do not eat. You may be taught to use the plate method for portion control. With the plate method, ½ of your plate contains non-starchy vegetables. The other half  is divided so ¼ contains protein, and ¼ contains carbohydrates. Ask your care team for more information about meal planning.         About physical activity and diabetes:  You will learn why physical activity, such as walking, is important. You and your care team provider will make a plan for your activity.            About a healthy body weight:  You will learn how a healthy weight can help you control diabetes and prevent heart disease. Ask your team what a healthy weight is for you. Ask your team to help you create a weight-loss plan, if needed. Even a loss of 3% to 7% of your excess body weight can help make a difference in managing diabetes. Your team will help you set manageable weight-loss goals, such as 10 to 15 pounds, or 5% of your extra weight.    About your blood sugar level:  You will learn what your blood sugar level should be. You will be given information on when and how to check your blood sugar level. You may need to check by testing a drop of blood in a glucose monitor. You may instead be given a continuous glucose monitoring (CGM) device. A sensor is placed in your abdomen or on your arm. You put a transmitter on the sensor to get a reading that shows up on the monitor. You will learn what to do if your level is too high or too low. Write down the times of your checks and your levels. Take them to all follow-up appointments.            About diabetes medicine:  You may need oral diabetes medicine, insulin, or both to help control your blood sugar levels. Your healthcare provider will teach you how and when to take oral diabetes medicine. You will also be taught about side effects oral diabetes medicine can cause. Insulin may be added if oral diabetes medicine becomes less effective over time. Insulin may be injected, or given through a pump or pen. You and your care team will discuss which method is best for you.    An insulin pump  is an implanted device that gives your insulin 24 hours a day.  An insulin pump prevents the need for multiple insulin injections in a day.         An insulin pen  is a device prefilled with the right amount of insulin.         You and your family members will be taught how to draw up and give insulin  if this is the best method for you. Your education team will also teach you how to dispose of needles and syringes.    You will learn how much insulin you need  and when to give it. You will be taught when not to give insulin. You will also be taught what to do if your blood sugar level drops too low. This may happen if you take insulin and do not eat the right amount of carbohydrates.    Other ways to help manage type 2 diabetes:   Talk to your care team providers if you have increased stress about your diagnosis.  Stress about your diagnosis can keep you from taking care of yourself properly. Your team can help by offering tips about self-care. Your team may suggest you talk to a mental health provider who can listen and offer help with self-care issues. Other types of counseling can help you make nutrition or physical activity changes.    Check your feet each day for sores.  Wear shoes and socks that fit correctly. Do not trim your toenails. Go to a podiatrist. Ask your care team for more information about foot care.         Do not smoke.  Nicotine and other chemicals in cigarettes and cigars can cause lung damage and make diabetes harder to manage. Ask your care team provider for information if you currently smoke and need help to quit. E-cigarettes or smokeless tobacco still contain nicotine. Talk to your care team provider before you use these products.    Drink water instead of sugary drinks such as soft drinks and fruit juices.  Sugary drinks increase your blood sugar level and weight. Your healthcare provider may tell you that diet drinks do not help with weight loss.    Know the risks if you choose to drink alcohol.  Alcohol can cause your blood sugar levels to be low if  you use insulin. Alcohol can cause high blood sugar levels and weight gain if you drink too much. A drink of alcohol is 12 ounces of beer, 5 ounces of wine, or 1½ ounces of liquor. Your care team can tell you how many drinks are okay to have in 24 hours and in 1 week.    Check your blood pressure as directed.  If you have high blood pressure (BP), talk to your care team about your BP goals. Together you can create a plan to lower your BP if needed and keep it in a healthy range. The plan may include lifestyle changes or medicines. A normal BP is 119/79 or lower. A normal blood pressure can help prevent or delay certain complications from diabetes. Examples include retinopathy (eye damage) and kidney damage.         Wear medical alert identification.  Wear medical alert jewelry or carry a card that says you have diabetes. Ask your care team provider where to get these items.         Ask about vaccines you may need.  You have a higher risk for serious illness if you get the flu, pneumonia, COVID-19, or hepatitis. Ask your provider if you should get vaccines to prevent these or other diseases, and when to get the vaccines.    Risks of type 2 diabetes:  It is important to learn to keep your diabetes in control. Uncontrolled diabetes can damage your nerves, veins, and arteries. Your risk for dementia increases faster the longer your diabetes is not controlled. High blood sugar levels may damage other body tissues and organs over time. Damage to arteries may increase your risk for heart attack and stroke. Nerve damage may also lead to other heart, stomach, and nerve problems. Diabetes can become life-threatening if it is not controlled.  Follow up with your care team providers as directed:  You may need to return to have your A1c checked every 3 months. You will need to have your feet checked during at least 1 visit each year. You will need an eye exam 1 time each year to check for retinopathy. You will also need tests to  check for kidney or heart disease, and high blood pressure. Write down your questions so you remember to ask them during your visits.  © Copyright [employer] 2023 Information is for End User's use only and may not be sold, redistributed or otherwise used for commercial purposes.  The above information is an  only. It is not intended as medical advice for individual conditions or treatments. Talk to your doctor, nurse or pharmacist before following any medical regimen to see if it is safe and effective for you.

## 2024-04-15 NOTE — ASSESSMENT & PLAN NOTE
Lab Results   Component Value Date    HGBA1C 6.6 (H) 03/05/2024     Now with A1C in DM range, new onset type 2 DM  Initiate treatment with Metformin ER 500mg daily  Recommended DM nutrition, patient declined, provided with multiple handouts for DM and nutrition recs  On statin  Needs DM eye exam  Will plan for DM foot exam at f/u   Check labs and follow up 3 mo

## 2024-04-15 NOTE — ASSESSMENT & PLAN NOTE
Quit smoking December 2023  Prior heavy smoker, 3 packs a day 40 years  Continue to monitor  Patient will need lung cancer screening, CT ordered

## 2024-04-15 NOTE — PROGRESS NOTES
"Daily Note     Today's date: 4/15/2024  Patient name: Christopher Smalls Jr.  : 1970  MRN: [id number]  Referring provider: Uziel Michael PA*  Dx:   Encounter Diagnosis     ICD-10-CM    1. Acute pain of right shoulder  M25.511       2. S/P right rotator cuff repair  Z98.890       3. Lumbar radiculopathy - Right  M54.16       4. Status post right rotator cuff repair  Z98.890                      Subjective: Patient notes that he has increased LBP today/ yesterday but he is unsure why. He notes that he is following precautions for the shoulder as directed.       Objective: See treatment diary below      Assessment: Tolerated treatment well. Patient would benefit from continued PT. Patient able to complete wand for flexion/ abd/ ER for AAROM in pain free range. Patient continues with stiffness of the B hips. Continues to work to gain core strength with PT intervention as well.       Plan: Continue per plan of care.      Precautions: Right RCR 3/13/24 - see protocol     Daily Treatment Diary:      Initial Evaluation Date: 24  Compliance 3/4  3/7  3/29  4/2  4/4  4/9  4/11  4/15       Visit Number 1 2  3  4  5  6  7  8       Re-Eval  IE   Y -post-op              MC   Foto Captured Y   Y                        3/4  3/7  3/29 4/2  4/4  4/9  4/11  4/15       Manual                      L/S STM ->  10 min  R shld 10 min  R shld 10 min  R shld x 10 min  R shld 5 min  R shoulder 5 min  -       HS stretch/ SKTC   30\" x3     30\" x3     R shld PROM 5 min    15 min  15m  15 mins  15 min  15 min  15 min       Scap mobilization R                      Ther-Ex                      LTR X10 5\"  5\"x10  5\" x10  5\"x10  5\" 10x  5\" x10  5\"x10  5\" x10       Hip IR stretch X5 10\" 10\"x5  -->                 SKTC c towel  5\"x10 D/C due to shld          TA set   5\" x10     5\" x10     Sub max RC iso - IR/ER/Ext/add/ abd           3\" x10 ea  3\"X10 ea  3\" x10 ea       Pendulums X10 ea    -->  1 min f/b, s/s  1 min eaF/B, S/S   1 min " "F/B, S/S  1 min F/B, S/S  1 min F/B, S/S       Ball roll on table     -->      1 min fwd  fwd 2 min// scap 1 min  fwd 2 min// scap 1 min       Digi        2 min Red  Red x 2 min  black  black 2 min  black 2 min       Wrist/elbow AROM       15x ea  15x ea  PRE sup/ pro, flex/ ext 2# x20 PRE sup/ pro, flex/ ext 2# x20  PRE sup/ pro, flex/ ext 2# x20       pulley           --> -->   -->       Wand- flex/abd/ ER           --> -->          Nu-Step (no arms)           10 min  10 min L2  10 min L4       Neuro Re-Ed                      Scap retractions     Sidely with assist x15  x15  20x  20x  20x  20x                    TA setting  5\"x10 --> 5\"x10 5\" 20x 5\" x20 5\"x20 5\"x20     TA with march  x10 --> x10 10x ea x10 2x10 2x10     TA with bridge      --> x10 x10     TA with walk out      -->  BKFO x10     TA with SLR  x10 -->   x10 x10 x10     Ther-Act              pt ed HEP instruction/ post-op considerations 10 min  post op review 8 min  post-op precautions/ HEP/ expectations/ goals 10 min  post-op precautions                                          Modalities                      Prone HP with TENs 10 min Mhp 10 min CP 10 min to shld post CP R shoulder 10 min  HP to L/S 10 min                                                                  "

## 2024-04-15 NOTE — PROGRESS NOTES
ADULT ANNUAL PHYSICAL  [facility] PRIMARY CARE    NAME: Christopher Smalls Jr.  AGE: 54 y.o. SEX: male  : 1970     DATE: 4/15/2024     Assessment and Plan:     Problem List Items Addressed This Visit       Coronary artery disease of native artery of native heart with stable angina pectoris (HCC)     Echo 3/2024: EF 56%, mild LV wall thickness, mod AR, mild TR, mild MR  Now est with [facility] cardiology  Continue asa, atorvastatin, metoprolol, zetia, amlodipine  Mgmt of new onset T2DM as below - will likely add ACEi at follow up  F/u 3mo           Relevant Orders    Hemoglobin A1C    Lipid panel    CBC and differential    Comprehensive metabolic panel    Albumin / creatinine urine ratio    Essential hypertension     Chronic  Current Blood Pressure: 132/79  Continue Metoprolol, amlodipine 5mg daily  F/u 3 mo            Relevant Orders    Hemoglobin A1C    Lipid panel    CBC and differential    Comprehensive metabolic panel    Albumin / creatinine urine ratio    Mixed hyperlipidemia     Lab Results   Component Value Date    CHOLESTEROL 110 2024    TRIG 114 2024    HDL 38 (L) 2024    LDLCALC 49 2024     Will continue on atorvastatin 80 mg daily, Zetia 10 mg daily.           Relevant Orders    Hemoglobin A1C    Lipid panel    CBC and differential    Comprehensive metabolic panel    Albumin / creatinine urine ratio    History of tobacco use disorder     Quit smoking 2023  Prior heavy smoker, 3 packs a day 40 years  Continue to monitor  Patient will need lung cancer screening, CT ordered         Type 2 diabetes mellitus without complication, without long-term current use of insulin ([facility])     Lab Results   Component Value Date    HGBA1C 6.6 (H) 2024     Now with A1C in DM range, new onset type 2 DM  Initiate treatment with Metformin ER 500mg daily  Recommended DM nutrition, patient declined, provided with multiple handouts  for DM and nutrition recs  On statin  Needs DM eye exam  Will plan for DM foot exam at f/u   Check labs and follow up 3 mo         Relevant Medications    metFORMIN (GLUCOPHAGE-XR) 500 mg 24 hr tablet     Other Visit Diagnoses       Annual physical exam    -  Primary    Nicotine dependence, cigarettes, in remission        Relevant Orders    CT lung screening program    Screening for colon cancer        Relevant Orders    Ambulatory Referral to Gastroenterology            Immunizations and preventive care screenings were discussed with patient today. Appropriate education was printed on patient's after visit summary.      Lung Cancer Screening Shared Decision Making: I discussed with him that he is a candidate for lung cancer CT screening.     The following Shared Decision-Making points were covered:  Benefits of screening were discussed, including the rates of reduction in death from lung cancer and other causes.  Harms of screening were reviewed, including false positive tests, radiation exposure levels, risks of invasive procedures, risks of complications of screening, and risk of overdiagnosis.  I counseled on the importance of adherence to annual lung cancer LDCT screening, impact of co-morbidities, and ability or willingness to undergo diagnosis and treatment.  I counseled on the importance of maintaining abstinence as a former smoker or was counseled on the importance of smoking cessation if a current smoker    Review of Eligibility Criteria: He meets all of the criteria for Lung Cancer Screening.   - He is 54 y.o.   - He has 20 pack year tobacco history and is a current smoker or has quit within the past 15 years  - He presents no signs or symptoms of lung cancer    After discussion, the patient decided to elect lung cancer screening.        Return in about 3 months (around 7/15/2024) for Recheck DM and other chronic conditions .     Chief Complaint:     Chief Complaint   Patient presents with    Physical  Exam     3 month       History of Present Illness:     Adult Annual Physical   Patient here for a comprehensive physical exam. ON review of labs, patient now with A1C in DM range.     Diet and Physical Activity  Diet/Nutrition: poor diet.   Exercise: no formal exercise.      Depression Follow up  PHQ-2/9 Depression Screening    Little interest or pleasure in doing things: 2 - more than half the days  Feeling down, depressed, or hopeless: 1 - several days  Trouble falling or staying asleep, or sleeping too much: 3 - nearly every day  Feeling tired or having little energy: 3 - nearly every day  Poor appetite or overeating: 3 - nearly every day  Feeling bad about yourself - or that you are a failure or have let yourself or your family down: 3 - nearly every day  Trouble concentrating on things, such as reading the newspaper or watching television: 3 - nearly every day  Moving or speaking so slowly that other people could have noticed. Or the opposite - being so fidgety or restless that you have been moving around a lot more than usual: 1 - several days  Thoughts that you would be better off dead, or of hurting yourself in some way: 0 - not at all       Patient is managed on Cymbalta, amitriptyline - cotreats his migraines and chronic pain.     General Health  Sleep:  6 hours on average .   Hearing: normal - bilateral.  Vision: most recent eye exam <1 year ago and wears glasses.   Dental: no dental visits for >1 year.        Health  Symptoms include: urinary frequency and nocturia, following with urology        Review of Systems:     Review of Systems   Constitutional:  Positive for fatigue and unexpected weight change (gain). Negative for activity change, appetite change, chills, diaphoresis and fever.   HENT:  Negative for congestion, rhinorrhea, sore throat and trouble swallowing.    Eyes:  Negative for visual disturbance.   Respiratory:  Negative for cough, choking and shortness of breath.    Cardiovascular:   Negative for chest pain, palpitations and leg swelling.   Gastrointestinal:  Negative for abdominal pain, constipation, diarrhea, nausea and vomiting.   Genitourinary:  Positive for frequency. Negative for difficulty urinating and dysuria.   Musculoskeletal:  Positive for arthralgias. Negative for joint swelling and myalgias.   Skin:  Negative for color change and rash.   Allergic/Immunologic: Negative for environmental allergies and food allergies.   Neurological:  Negative for dizziness, light-headedness and headaches.   Psychiatric/Behavioral:  Positive for sleep disturbance. Negative for dysphoric mood. The patient is not nervous/anxious.       Past Medical History:     Past Medical History:   Diagnosis Date    Abnormal brain MRI     Aortic regurgitation     Aortic valve mass     Arthritis     Arthritis     Back pain     Bowel obstruction (HCC)     CAD in native artery     Coronary arteriosclerosis     Coronary artery disease     Diabetes (HCC)     ED (erectile dysfunction)     Elevated PSA     GE reflux     Hand tingling     Headache     Headache, tension-type     Heart attack (HCC)     Heart murmur     High cholesterol     Hypertension     Hypertension     Left leg pain     Low back pain     Lumbar radiculopathy     Migraine     Mitral regurgitation     Mood disorder (HCC)     Myocardial infarct (HCC)     Osteoarthritis of wrist     Osteoporosis     Prediabetes     Prostate cancer (HCC)     Psychiatric disorder     Right shoulder pain     Sciatica     Spinal osteoarthritis     Sprain rotator cuff     Sprain, lumbar     Stress incontinence     TIA (transient ischemic attack)     Trigger ring finger of right hand     Urinary leakage     Weight disorder       Past Surgical History:     Past Surgical History:   Procedure Laterality Date    APPENDECTOMY      CARDIAC CATHETERIZATION      CARDIAC SURGERY      CARDIOVASCULAR STRESS TEST      CHOLECYSTECTOMY      COLECTOMY      partial for bowel obstruction    HERNIA  REPAIR      OTHER SURGICAL HISTORY      Stent placement    ID SURGICAL ARTHROSCOPY SHOULDER W/ROTATOR CUFF RPR Right 3/13/2024    Procedure: REPAIR ROTATOR CUFF  ARTHROSCOPIC, subacromial decompression;  Surgeon: Randy Blue MD;  Location: [facility];  Service: Orthopedics    PROSTATE SURGERY      Radical prostatectomy      Family History:     Family History   Problem Relation Age of Onset    Breast cancer Mother     Hypertension Mother     Heart disease Father     Hypertension Father     Prostate cancer Father     Rheum arthritis Father     Stroke Father     Heart disease Sister     Hypertension Sister     Other Sister         Resp. disease      Social History:     Social History     Socioeconomic History    Marital status: /Civil Union     Spouse name: None    Number of children: None    Years of education: None    Highest education level: None   Occupational History    None   Tobacco Use    Smoking status: Former     Current packs/day: 0.00     Average packs/day: 3.0 packs/day for 49.0 years (147.0 ttl pk-yrs)     Types: Cigarettes     Quit date: 2023     Years since quittin.3     Passive exposure: Past    Smokeless tobacco: Former     Quit date: 2023   Vaping Use    Vaping status: Never Used   Substance and Sexual Activity    Alcohol use: Not Currently    Drug use: Never    Sexual activity: Not Currently     Partners: Female     Birth control/protection: None   Other Topics Concern    None   Social History Narrative    None     Social Determinants of Health     Financial Resource Strain: Not on file   Food Insecurity: Not on file   Transportation Needs: Not on file   Physical Activity: Not on file   Stress: Not on file   Social Connections: Not on file   Intimate Partner Violence: Not on file   Housing Stability: Not on file      Current Medications:     Current Outpatient Medications   Medication Sig Dispense Refill    acetaminophen (TYLENOL) 325 mg tablet Take 1 tablet (325 mg total)  by mouth every 6 (six) hours as needed for mild pain 30 tablet 0    amitriptyline (ELAVIL) 25 mg tablet Take 1 tablet (25mg) by mouth at bedtime for 1 week.  May increase to 2 tablets (50mg) at bedtime thereafter if tolerating well. 60 tablet 3    amLODIPine (NORVASC) 5 mg tablet Take 1 tablet by mouth once daily 90 tablet 1    aspirin 81 mg chewable tablet Chew 1 tablet (81 mg total) daily 30 tablet 3    atorvastatin (LIPITOR) 80 mg tablet Take 1 tablet by mouth once daily 90 tablet 1    DULoxetine (CYMBALTA) 60 mg delayed release capsule Take 1 capsule by mouth once daily 90 capsule 1    ezetimibe (ZETIA) 10 mg tablet Take 1 tablet by mouth once daily 90 tablet 1    metFORMIN (GLUCOPHAGE-XR) 500 mg 24 hr tablet Take 1 tablet (500 mg total) by mouth daily with breakfast 90 tablet 0    metoprolol succinate (TOPROL-XL) 25 mg 24 hr tablet Take 1 tablet by mouth once daily 90 tablet 1    naproxen (Naprosyn) 500 mg tablet Take 1 tablet (500 mg total) by mouth 2 (two) times a day with meals 60 tablet 0    omeprazole (PriLOSEC) 20 mg delayed release capsule Take 1 capsule (20 mg total) by mouth daily 90 capsule 1    pregabalin (LYRICA) 75 mg capsule Take 1 capsule (75 mg total) by mouth 3 (three) times a day 90 capsule 2    SUMAtriptan (IMITREX) 100 mg tablet Take 1 tablet (100 mg total) by mouth as needed for migraine May repeat in 2 hours. Limit of 3/week or 9/month 16 tablet 3     No current facility-administered medications for this visit.      Allergies:     No Known Allergies   Physical Exam:     /79 (BP Location: Right arm, Patient Position: Sitting, Cuff Size: Standard)   Pulse 83   Wt 98.1 kg (216 lb 4.3 oz)   SpO2 97%   BMI 33.87 kg/m²     Physical Exam  Vitals reviewed.   Constitutional:       General: He is not in acute distress.     Appearance: He is obese. He is not ill-appearing.   HENT:      Head: Normocephalic and atraumatic.      Right Ear: External ear normal.      Left Ear: External ear  normal.      Nose: Nose normal.   Eyes:      Extraocular Movements: Extraocular movements intact.      Conjunctiva/sclera: Conjunctivae normal.   Neck:      Vascular: No carotid bruit.   Cardiovascular:      Rate and Rhythm: Normal rate and regular rhythm.      Heart sounds: Murmur heard.   Pulmonary:      Effort: Pulmonary effort is normal.      Breath sounds: Normal breath sounds.   Abdominal:      General: Abdomen is flat. Bowel sounds are normal.      Palpations: Abdomen is soft.   Musculoskeletal:      Cervical back: Neck supple.      Right lower leg: No edema.      Left lower leg: No edema.   Skin:     General: Skin is warm.   Neurological:      General: No focal deficit present.      Mental Status: He is alert.   Psychiatric:         Mood and Affect: Mood normal.         Behavior: Behavior normal.         Thought Content: Thought content normal.          [facility]

## 2024-04-15 NOTE — ASSESSMENT & PLAN NOTE
Lab Results   Component Value Date    CHOLESTEROL 110 03/05/2024    TRIG 114 03/05/2024    HDL 38 (L) 03/05/2024    LDLCALC 49 03/05/2024     Will continue on atorvastatin 80 mg daily, Zetia 10 mg daily.

## 2024-04-18 ENCOUNTER — OFFICE VISIT (OUTPATIENT)
Dept: CARDIOLOGY CLINIC | Facility: CLINIC | Age: 54
End: 2024-04-18
Payer: COMMERCIAL

## 2024-04-18 VITALS
HEART RATE: 78 BPM | HEIGHT: 67 IN | WEIGHT: 216.2 LBS | BODY MASS INDEX: 33.93 KG/M2 | SYSTOLIC BLOOD PRESSURE: 152 MMHG | OXYGEN SATURATION: 96 % | DIASTOLIC BLOOD PRESSURE: 88 MMHG

## 2024-04-18 DIAGNOSIS — I10 ESSENTIAL HYPERTENSION: ICD-10-CM

## 2024-04-18 DIAGNOSIS — E78.2 MIXED HYPERLIPIDEMIA: ICD-10-CM

## 2024-04-18 DIAGNOSIS — I35.1 NONRHEUMATIC AORTIC VALVE INSUFFICIENCY: Primary | ICD-10-CM

## 2024-04-18 DIAGNOSIS — I25.10 CORONARY ARTERY DISEASE INVOLVING NATIVE CORONARY ARTERY OF NATIVE HEART WITHOUT ANGINA PECTORIS: ICD-10-CM

## 2024-04-18 DIAGNOSIS — E11.9 TYPE 2 DIABETES MELLITUS WITHOUT COMPLICATION, WITHOUT LONG-TERM CURRENT USE OF INSULIN (HCC): ICD-10-CM

## 2024-04-18 PROCEDURE — 99244 OFF/OP CNSLTJ NEW/EST MOD 40: CPT | Performed by: INTERNAL MEDICINE

## 2024-04-18 NOTE — ASSESSMENT & PLAN NOTE
Recent echocardiogram reported normal left ventricular systolic function and mild to moderate aortic regurgitation but normal aortic dimensions.  We will reimage next year.

## 2024-04-18 NOTE — ASSESSMENT & PLAN NOTE
Patient has atypical occasional chest discomfort.  His recent nuclear stress test, even though it was not optimal stress level, showed no evidence of ischemia or infarction with normal systolic function.  In the absence of any clear exertional angina symptoms, no indication to recommend coronary angiography at this time.  The fact that he has chronic migraine and uses Imitrex, may also be contributing to occasional chest discomfort.  Ideally he should be on alternative therapy.  Will defer that to neurology.

## 2024-04-18 NOTE — ASSESSMENT & PLAN NOTE
Lab Results   Component Value Date    HGBA1C 6.6 (H) 03/05/2024   Patient has been placed on metformin by his primary care physician.  We did discuss lifestyle modification measures with regular exercise and dietary modification for improved glycemic control.

## 2024-04-18 NOTE — ASSESSMENT & PLAN NOTE
Patient reports that his blood pressure normally is quite well-controlled.  He did not take his morning medications today.  Will continue the same medications.

## 2024-04-18 NOTE — PROGRESS NOTES
"Requested Prescriptions   Pending Prescriptions Disp Refills     atorvastatin (LIPITOR) 40 MG tablet [Pharmacy Med Name: ATORVASTATIN 40MG TABLET] 30 tablet      Sig: TAKE 1 TABLET BY MOUTH DAILY AT BEDTIME    Statins Protocol Passed    2/21/2018 10:09 AM       Passed - LDL on file in past 12 months    Recent Labs   Lab Test  06/15/17   0852   LDL  64            Passed - No abnormal creatine kinase in past 12 months    No lab results found.         Passed - Recent or future visit with authorizing provider    Patient had office visit in the last year or has a visit in the next 30 days with authorizing provider.  See \"Patient Info\" tab in inbasket, or \"Choose Columns\" in Meds & Orders section of the refill encounter.            Passed - Patient is age 18 or older          Last Written Prescription Date:  2/2/17  Last Fill Quantity: 30,  # refills: 11   Last Office Visit with [facility], P or [facility] prescribing provider:  11/21/17   Future Office Visit:       " [facility] [place]  [address]  [address]  [facility] [address] [phone]      Patient name: Christopher Smalls Jr.   YOB: 1970   MR no: [id number]      Diagnosis ICD-10-CM Associated Orders   1. Nonrheumatic aortic valve insufficiency  I35.1       2. Essential hypertension  I10       3. Mixed hyperlipidemia  E78.2       4. Coronary artery disease involving native coronary artery of native heart without angina pectoris  I25.10 Ambulatory Referral to Cardiology      5. Type 2 diabetes mellitus without complication, without long-term current use of insulin (HCC)  E11.9         ASSESSMENT AND RECOMMENDATIONS:  1. Nonrheumatic aortic valve insufficiency  Assessment & Plan:  Recent echocardiogram reported normal left ventricular systolic function and mild to moderate aortic regurgitation but normal aortic dimensions.  We will reimage next year.      2. Essential hypertension  Assessment & Plan:  Patient reports that his blood pressure normally is quite well-controlled.  He did not take his morning medications today.  Will continue the same medications.      3. Mixed hyperlipidemia  Assessment & Plan:  Most recent lipid profile showed excellent lipids on current medications.      4. Coronary artery disease involving native coronary artery of native heart without angina pectoris  Assessment & Plan:  Patient has atypical occasional chest discomfort.  His recent nuclear stress test, even though it was not optimal stress level, showed no evidence of ischemia or infarction with normal systolic function.  In the absence of any clear exertional angina symptoms, no indication to recommend coronary angiography at this time.  The fact that he has chronic migraine and uses Imitrex, may also be contributing to occasional chest discomfort.  Ideally he should be on alternative therapy.  Will defer that to neurology.    Orders:  -     Ambulatory Referral to  Cardiology    5. Type 2 diabetes mellitus without complication, without long-term current use of insulin (HCC)  Assessment & Plan:    Lab Results   Component Value Date    HGBA1C 6.6 (H) 03/05/2024   Patient has been placed on metformin by his primary care physician.  We did discuss lifestyle modification measures with regular exercise and dietary modification for improved glycemic control.           CHIEF COMPLAINT:  CAD, HTN  HPI  54-year-old male (poor historian) with past medical history significant for coronary artery disease status post diagnostic coronary angiography in October 2019 showing significant disease and small branch of first marginal which was stented as well as significant disease in a septal/accessory LAD which was stented as well at [facility].  Right coronary artery had 40% stenosis at that time.  Chronic migraines, hypertension, pre-diabetes, TIA  (in 2022) and hyperlipidemia who presents for his first visit.  Patient reports that he had chest pain in 2019 which led to the coronary angiography and intervention with resolution of his symptoms.  He gets occasional atypical chest discomfort.  He has been active and complained of mild CP and that lead to a stress test in 3-2024 which showed no obvious ischemia.  He is currently on short-term disability due to undergoing rotator cuff surgery on his right shoulder.    Past Medical History:   Diagnosis Date    Abnormal brain MRI     Aortic regurgitation     Aortic valve mass     Arthritis     Arthritis     Back pain     Bowel obstruction (HCC)     CAD in native artery     Coronary arteriosclerosis     Coronary artery disease     Diabetes (HCC)     ED (erectile dysfunction)     Elevated PSA     GE reflux     Hand tingling     Headache     Headache, tension-type     Heart attack (HCC)     Heart murmur     High cholesterol     Hypertension     Hypertension     Left leg pain     Low back pain     Lumbar radiculopathy     Migraine     Mitral regurgitation      Mood disorder (HCC)     Myocardial infarct (HCC)     Osteoarthritis of wrist     Osteoporosis     Prediabetes     Prostate cancer (HCC)     Psychiatric disorder     Right shoulder pain     Sciatica     Spinal osteoarthritis     Sprain rotator cuff     Sprain, lumbar     Stress incontinence     TIA (transient ischemic attack)     Trigger ring finger of right hand     Urinary leakage     Weight disorder         Past Surgical History:   Procedure Laterality Date    APPENDECTOMY      CARDIAC CATHETERIZATION      CARDIAC SURGERY      CARDIOVASCULAR STRESS TEST      CHOLECYSTECTOMY      COLECTOMY      partial for bowel obstruction    HERNIA REPAIR      OTHER SURGICAL HISTORY      Stent placement    OR SURGICAL ARTHROSCOPY SHOULDER W/ROTATOR CUFF RPR Right 3/13/2024    Procedure: REPAIR ROTATOR CUFF  ARTHROSCOPIC, subacromial decompression;  Surgeon: Randy Blue MD;  Location: [facility];  Service: Orthopedics    PROSTATE SURGERY      Radical prostatectomy        Social History     Tobacco Use    Smoking status: Former     Current packs/day: 0.00     Average packs/day: 3.0 packs/day for 49.0 years (147.0 ttl pk-yrs)     Types: Cigarettes     Quit date: 2023     Years since quittin.3     Passive exposure: Past    Smokeless tobacco: Former     Quit date: 2023   Vaping Use    Vaping status: Never Used   Substance Use Topics    Alcohol use: Not Currently    Drug use: Never       Family History   Problem Relation Age of Onset    Breast cancer Mother     Hypertension Mother     Heart disease Father     Hypertension Father     Prostate cancer Father     Rheum arthritis Father     Stroke Father     Heart disease Sister     Hypertension Sister     Other Sister         Resp. disease        No Known Allergies      Current Outpatient Medications:     acetaminophen (TYLENOL) 325 mg tablet, Take 1 tablet (325 mg total) by mouth every 6 (six) hours as needed for mild pain, Disp: 30 tablet, Rfl: 0    amitriptyline  (ELAVIL) 25 mg tablet, Take 1 tablet (25mg) by mouth at bedtime for 1 week.  May increase to 2 tablets (50mg) at bedtime thereafter if tolerating well., Disp: 60 tablet, Rfl: 3    amLODIPine (NORVASC) 5 mg tablet, Take 1 tablet by mouth once daily, Disp: 90 tablet, Rfl: 1    aspirin 81 mg chewable tablet, Chew 1 tablet (81 mg total) daily, Disp: 30 tablet, Rfl: 3    atorvastatin (LIPITOR) 80 mg tablet, Take 1 tablet by mouth once daily, Disp: 90 tablet, Rfl: 1    DULoxetine (CYMBALTA) 60 mg delayed release capsule, Take 1 capsule by mouth once daily, Disp: 90 capsule, Rfl: 1    ezetimibe (ZETIA) 10 mg tablet, Take 1 tablet by mouth once daily, Disp: 90 tablet, Rfl: 1    metFORMIN (GLUCOPHAGE-XR) 500 mg 24 hr tablet, Take 1 tablet (500 mg total) by mouth daily with breakfast, Disp: 90 tablet, Rfl: 0    metoprolol succinate (TOPROL-XL) 25 mg 24 hr tablet, Take 1 tablet by mouth once daily, Disp: 90 tablet, Rfl: 1    naproxen (Naprosyn) 500 mg tablet, Take 1 tablet (500 mg total) by mouth 2 (two) times a day with meals, Disp: 60 tablet, Rfl: 0    omeprazole (PriLOSEC) 20 mg delayed release capsule, Take 1 capsule (20 mg total) by mouth daily, Disp: 90 capsule, Rfl: 1    pregabalin (LYRICA) 75 mg capsule, Take 1 capsule (75 mg total) by mouth 3 (three) times a day, Disp: 90 capsule, Rfl: 2    SUMAtriptan (IMITREX) 100 mg tablet, Take 1 tablet (100 mg total) by mouth as needed for migraine May repeat in 2 hours. Limit of 3/week or 9/month, Disp: 16 tablet, Rfl: 3     Lab Results   Component Value Date    CREATININE 0.78 03/05/2024    CREATININE 0.94 02/14/2024    CREATININE 0.90 01/11/2024    K 3.9 03/05/2024    K 3.9 02/14/2024    K 4.6 01/11/2024    SODIUM 140 03/05/2024    SODIUM 140 02/14/2024    SODIUM 136 01/11/2024    LDLCALC 49 03/05/2024    TRIG 114 03/05/2024    HDL 38 (L) 03/05/2024    CHOLESTEROL 110 03/05/2024   STRESS-MPI 3-2024:   Stress ECG: No ST deviation is noted. The ECG was not diagnostic due to  "submaximal stress.    Stress Function: Left ventricular function post-stress is normal. Stress ejection fraction is 56%.    Non diagnostic stress nuclear images given failure to achieve 85% MPHR.  At 80% MPHR and 10.1 METS no ischemic was seen. Cannot exclude ischemia at higher heart rates.  ECHO 3-2024:   Left Ventricle: Left ventricular cavity size is normal. Wall thickness is mildly increased. The left ventricular ejection fraction is 56% by biplane measurement. Systolic function is normal. Wall motion is normal. Left atrial filling pressure is normal.    Right Ventricle: Right ventricular cavity size is mildly dilated. Systolic function is normal.    Aortic Valve: There is mild to moderate regurgitation.    Mitral Valve: There is mild regurgitation.    Tricuspid Valve: There is mild regurgitation. The estimated right ventricular systolic pressure is 23.00 mmHg    I have personally reviewed the EKG    REVIEW OF SYSTEMS   Positive for:  Headaches, atypical CP, OA and shoulder pain  Negative for: All remaining as reviewed below and in HPI.    SYSTEM SYMPTOMS REVIEWED:  General--weight change, fever, night sweats  Respiratory--cough, wheezing, shortness of breath, sputum production  Cardiovascular--chest pain, syncope, dyspnea on exertion, edema, decline in exercise tolerance, claudication   Gastrointestinal--persistent vomiting, diarrhea, abdominal distention, blood in stool   Muscular or skeletal--joint pain or swelling   Neurologic--headaches, syncope, abnormal movement  Hematologic--history of easy bruising and bleeding   Endocrine--thyroid enlargement, heat or cold intolerance, polyuria   Psychiatric--anxiety, depression     Vitals:    04/18/24 0841   BP: 152/88   Pulse: 78   SpO2: 96%   Weight: 98.1 kg (216 lb 3.2 oz)   Height: 5' 7\" (1.702 m)       Wt Readings from Last 3 Encounters:   04/18/24 98.1 kg (216 lb 3.2 oz)   04/15/24 98.1 kg (216 lb 4.3 oz)   04/09/24 97.3 kg (214 lb 9.6 oz)        Body mass index " "is 33.86 kg/m².     General physical examination:    General appearance: Alert, no acute distress, appears stated age, Mild obesity.  HEENT: Mucous membranes are moist.  No obvious abnormality noted.  Neck: Supple with no lymphadenopathy.  No JVD.  Carotid pulses are intact.  No carotid bruit.  Cardiovascular system: Regular rhythm.  Normal S1 and S2.  Soft 1 x 6 systolic murmur at the base.  No rubs or gallops. Extremities: No edema. No cyanosis.  Pulmonary: Respirations unlabored.  Good air entry bilaterally.  Clear to auscultation bilaterally.  Gastrointestinal: Abdomen is soft and nontender.  Bowel sounds are positive.  Musculoskeletal: Upper Extremities: Normal upper motor strength. Lower Extremity: Normal motor strength. Gait: Normal.   Skin: Skin is warm. No rashes or lesions.  Neurological: Patient is alert and oriented with no gross motor deficits.  Psychiatric: Mood is depressed.          Thank you for allowing me to be a part of this patient's care. If you have further questions, please feel free to contact me.    Blaze Farooq MD, FACC, ALEXIA    Portions of the record  have been created with voice recognition software.  Occasional grammatical mistakes or wrong word or \"sound alike\" substitutions may have occurred due to the inherent limitations of voice recognition software. Please reach out to me directly for any clarifications.     "

## 2024-04-19 ENCOUNTER — OFFICE VISIT (OUTPATIENT)
Dept: PHYSICAL THERAPY | Facility: CLINIC | Age: 54
End: 2024-04-19
Payer: COMMERCIAL

## 2024-04-19 ENCOUNTER — TELEPHONE (OUTPATIENT)
Age: 54
End: 2024-04-19

## 2024-04-19 DIAGNOSIS — Z98.890 STATUS POST RIGHT ROTATOR CUFF REPAIR: ICD-10-CM

## 2024-04-19 DIAGNOSIS — M54.16 LUMBAR RADICULOPATHY: ICD-10-CM

## 2024-04-19 DIAGNOSIS — Z98.890 S/P RIGHT ROTATOR CUFF REPAIR: ICD-10-CM

## 2024-04-19 DIAGNOSIS — M25.511 ACUTE PAIN OF RIGHT SHOULDER: Primary | ICD-10-CM

## 2024-04-19 PROCEDURE — 97140 MANUAL THERAPY 1/> REGIONS: CPT

## 2024-04-19 PROCEDURE — 97110 THERAPEUTIC EXERCISES: CPT

## 2024-04-19 PROCEDURE — 97112 NEUROMUSCULAR REEDUCATION: CPT

## 2024-04-19 NOTE — PROGRESS NOTES
"Daily Note     Today's date: 2024  Patient name: Christopher Smalls Jr.  : 1970  MRN: [id number]  Referring provider: Uziel Michael PA*  Dx:   Encounter Diagnosis     ICD-10-CM    1. Acute pain of right shoulder  M25.511       2. S/P right rotator cuff repair  Z98.890       3. Lumbar radiculopathy - Right  M54.16       4. Status post right rotator cuff repair  Z98.890                      Subjective: Pt reported increase in lower back sx's today with increased difficulty with standing in full extension post sitting and also bending down to tie his R shoe.  Pt reports no significant change in his shoulder sx's today and remains compliant with protocol and restrictions.      Objective: See treatment diary below      Assessment: Tolerated treatment well. Patient exhibited good technique with therapeutic exercises.  He reported improvement with his shoulder mobility today and is looking forward to advancing to the 6 week sherman of his protocol.  He does report discomfort with his back and fwd bending.      Plan: Continue per plan of care.      Precautions: Right RCR 3/13/24 - see protocol     Daily Treatment Diary:      Initial Evaluation Date: 24  Compliance 3/4  3/7  3/29  4/2  4/4  4/9  4/11  4/15  4/19     Visit Number 1 2  3  4  5  6  7  8  9     Re-Eval  IE   Y -post-op              MC   Foto Captured Y   Y                        3/4  3/7  3/29 4/2  4/4  4/9  4/11  4/15  4/19     Manual                      L/S STM ->  10 min  R shld 10 min  R shld 10 min  R shld x 10 min  R shld 5 min  R shoulder 5 min  -  -     HS stretch/ SKTC   30\" x3     30\" x3 30\"x3    R shld PROM 5 min    15 min  15m  15 mins  15 min  15 min  15 min  15min     Scap mobilization R                      Ther-Ex                      LTR X10 5\"  5\"x10  5\" x10  5\"x10  5\" 10x  5\" x10  5\"x10  5\" x10  5\"x10     Hip IR stretch X5 10\" 10\"x5  -->                 SKTC c towel  5\"x10 D/C due to shld          TA set   5\" x10     5\" x10 " "5\"x10    Sub max RC iso - IR/ER/Ext/add/ abd           3\" x10 ea  3\"X10 ea  3\" x10 ea  3\"x10     Pendulums X10 ea    -->  1 min f/b, s/s  1 min eaF/B, S/S   1 min F/B, S/S  1 min F/B, S/S  1 min F/B, S/S  1 min F/B S/S     Ball roll on table     -->      1 min fwd  fwd 2 min// scap 1 min  fwd 2 min// scap 1 min  fwd 2 min/scap 1 min     Digi        2 min Red  Red x 2 min  black  black 2 min  black 2 min  black 2 min     Wrist/elbow AROM       15x ea  15x ea  PRE sup/ pro, flex/ ext 2# x20 PRE sup/ pro, flex/ ext 2# x20  PRE sup/ pro, flex/ ext 2# x20  PRE sup/pro, flex/ext 2# x20     pulley           --> -->   -->  ---->     Wand- flex/abd/ ER           --> -->   ---> --->     Nu-Step (no arms)           10 min  10 min L2  10 min L4  10 min L4     Neuro Re-Ed                      Scap retractions     Sidely with assist x15  x15  20x  20x  20x  20x  20x                  TA setting  5\"x10 --> 5\"x10 5\" 20x 5\" x20 5\"x20 5\"x20 5\" x20    TA with march  x10 --> x10 10x ea x10 2x10 2x10 2x10    TA with bridge      --> x10 x10 x10    TA with walk out      -->  BKFO x10 BKFO x10    TA with SLR  x10 -->   x10 x10 x10 x10    Ther-Act              pt ed HEP instruction/ post-op considerations 10 min  post op review 8 min  post-op precautions/ HEP/ expectations/ goals 10 min  post-op precautions                                          Modalities                      Prone HP with TENs 10 min Mhp 10 min CP 10 min to shld post CP R shoulder 10 min  HP to L/S 10 min   HP to L/S 10 min                                                                 "

## 2024-04-19 NOTE — TELEPHONE ENCOUNTER
Caller: Brandon at [employer] [facility] [place]    Doctor: Mirza    Reason for call:     Checking on medical release form faxed on 4/17/24, not showing scanned in Epic,   She will refax.    Call back#: n/a

## 2024-04-23 ENCOUNTER — OFFICE VISIT (OUTPATIENT)
Dept: PHYSICAL THERAPY | Facility: CLINIC | Age: 54
End: 2024-04-23
Payer: COMMERCIAL

## 2024-04-23 DIAGNOSIS — Z98.890 STATUS POST RIGHT ROTATOR CUFF REPAIR: ICD-10-CM

## 2024-04-23 DIAGNOSIS — Z98.890 S/P RIGHT ROTATOR CUFF REPAIR: ICD-10-CM

## 2024-04-23 DIAGNOSIS — M54.16 LUMBAR RADICULOPATHY: ICD-10-CM

## 2024-04-23 DIAGNOSIS — M25.511 ACUTE PAIN OF RIGHT SHOULDER: Primary | ICD-10-CM

## 2024-04-23 PROCEDURE — 97110 THERAPEUTIC EXERCISES: CPT

## 2024-04-23 PROCEDURE — 97140 MANUAL THERAPY 1/> REGIONS: CPT

## 2024-04-23 PROCEDURE — 97112 NEUROMUSCULAR REEDUCATION: CPT

## 2024-04-23 NOTE — PROGRESS NOTES
"Daily Note     Today's date: 2024  Patient name: Christopher Smalls Jr.  : 1970  MRN: [id number]  Referring provider: Uziel Michael PA*  Dx:   Encounter Diagnosis     ICD-10-CM    1. Acute pain of right shoulder  M25.511       2. S/P right rotator cuff repair  Z98.890       3. Lumbar radiculopathy - Right  M54.16       4. Status post right rotator cuff repair  Z98.890                      Subjective:  Patient reports that his back and shoulder are feeling better.  He notes having a little pain this morning in his shoulder and R low back.        Objective: See treatment diary below      Assessment: Patient tolerated treatment well.  Patient exhibited good technique with therapeutic exercises and would benefit from continued PT to address deficits and attain set goals.  Patient performed ex without complaint.  Good R shoulder PROM noted for 5 1/2 weeks post op RCR.  Patient reported decreased pain post treatment.        Plan: Continue per plan of care.      Precautions: Right RCR 3/13/24 - see protocol     Daily Treatment Diary:      Initial Evaluation Date: 24  Compliance   3/7  3/29  4  4/4  4/9  4/11  4/15  4/19  4/23   Visit Number  2  3  4  5  6  7  8  9  10   Re-Eval     Y -post-op                Foto Captured    Y                          3/7  3/29 4/2  4/4  4/9  4/11  4/15  4/19  4/23   Manual                      L/S STM ->  10 min  R shld 10 min  R shld 10 min  R shld x 10 min  R shld 5 min  R shoulder 5 min  -  -     HS stretch/ SKTC   30\" x3     30\" x3 30\"x3 30\"x3 B   R shld PROM     15 min  15m  15 mins  15 min  15 min  15 min  15min  15 min   Scap mobilization R                      Ther-Ex                      LTR  5\"x10  5\" x10  5\"x10  5\" 10x  5\" x10  5\"x10  5\" x10  5\"x10  5\"x10   Hip IR stretch  10\"x5  -->                 SKTC c towel  5\"x10 D/C due to shld          TA set   5\" x10     5\" x10 5\"x10 5\"x10   Sub max RC iso - IR/ER/Ext/add/ abd           3\" x10 ea  3\"X10 ea  3\" x10 " "ea  3\"x10  3\"x10 ea   Pendulums     -->  1 min f/b, s/s  1 min eaF/B, S/S   1 min F/B, S/S  1 min F/B, S/S  1 min F/B, S/S  1 min F/B S/S  1 min  F/B  S/S   Ball roll on table     -->      1 min fwd  fwd 2 min// scap 1 min  fwd 2 min// scap 1 min  fwd 2 min/scap 1 min  fwd 2 min/ scap   1 min   Digi        2 min Red  Red x 2 min  black  black 2 min  black 2 min  black 2 min  black  2 min   Wrist/elbow AROM       15x ea  15x ea  PRE sup/ pro, flex/ ext 2# x20 PRE sup/ pro, flex/ ext 2# x20  PRE sup/ pro, flex/ ext 2# x20  PRE sup/pro, flex/ext 2# x20  PRE sup/pro  Flex/ext 2#  x20   pulley           --> -->   -->  ---->     Wand- flex/abd/ ER           --> -->   ---> --->     Nu-Step (no arms)           10 min  10 min L2  10 min L4  10 min L4  10 min  L4   Neuro Re-Ed                      Scap retractions     Sidely with assist x15  x15  20x  20x  20x  20x  20x  20x                TA setting  5\"x10 --> 5\"x10 5\" 20x 5\" x20 5\"x20 5\"x20 5\" x20 5\"x20   TA with march  x10 --> x10 10x ea x10 2x10 2x10 2x10 2x10   TA with bridge      --> x10 x10 x10 x10   TA with walk out      -->  BKFO x10 BKFO x10 BKFO  X10 B   TA with SLR  x10 -->   x10 x10 x10 x10 X10 B   Ther-Act              pt ed   post op review 8 min  post-op precautions/ HEP/ expectations/ goals 10 min  post-op precautions                                          Modalities                      Prone HP with TENs  Mhp 10 min CP 10 min to shld post CP R shoulder 10 min  HP to L/S 10 min   HP to L/S 10 min HP to L/S   X10 min                                                                                            "

## 2024-04-24 ENCOUNTER — HOSPITAL ENCOUNTER (OUTPATIENT)
Dept: CT IMAGING | Facility: HOSPITAL | Age: 54
Discharge: HOME | End: 2024-04-24
Payer: COMMERCIAL

## 2024-04-24 DIAGNOSIS — F17.211 NICOTINE DEPENDENCE, CIGARETTES, IN REMISSION: ICD-10-CM

## 2024-04-24 PROCEDURE — 71271 CT THORAX LUNG CANCER SCR C-: CPT

## 2024-04-25 ENCOUNTER — OFFICE VISIT (OUTPATIENT)
Dept: PHYSICAL THERAPY | Facility: CLINIC | Age: 54
End: 2024-04-25
Payer: COMMERCIAL

## 2024-04-25 DIAGNOSIS — Z98.890 STATUS POST RIGHT ROTATOR CUFF REPAIR: ICD-10-CM

## 2024-04-25 DIAGNOSIS — M54.16 LUMBAR RADICULOPATHY: ICD-10-CM

## 2024-04-25 DIAGNOSIS — Z98.890 S/P RIGHT ROTATOR CUFF REPAIR: ICD-10-CM

## 2024-04-25 DIAGNOSIS — M25.511 ACUTE PAIN OF RIGHT SHOULDER: Primary | ICD-10-CM

## 2024-04-25 PROCEDURE — 97110 THERAPEUTIC EXERCISES: CPT

## 2024-04-25 PROCEDURE — 97140 MANUAL THERAPY 1/> REGIONS: CPT

## 2024-04-25 PROCEDURE — 97112 NEUROMUSCULAR REEDUCATION: CPT

## 2024-04-25 NOTE — PROGRESS NOTES
"Daily Note     Today's date: 2024  Patient name: Christopher Smalls Jr.  : 1970  MRN: [id number]  Referring provider: Uziel Michael PA*  Dx:   Encounter Diagnosis     ICD-10-CM    1. Acute pain of right shoulder  M25.511       2. S/P right rotator cuff repair  Z98.890       3. Lumbar radiculopathy - Right  M54.16       4. Status post right rotator cuff repair  Z98.890                      Subjective: Patient denies significant R shoulder or low back discomfort at start of session.      Objective: See treatment diary below      Assessment: Progressed R shoulder program as outlined below as patient progresses into 6 week phase of protocol. Tolerated treatment well. He demonstrates good tolerance to ROM progressions today with minimal ROM limitations active-assertively and passively. Patient would benefit from continued PT to increase R shoulder ROM and strength as able as well as core stability for improved function and activity tolerance.      Plan: Continue per plan of care.      Precautions: Right RCR 3/13/24 - see protocol     Daily Treatment Diary:      Initial Evaluation Date: 24  Compliance 4/25     4/4  4/9  4/11  4/15  4/19  4/23   Visit Number 11     5  6  7  8  9  10   Re-Eval                   Foto Captured                          4/25     4/4  4/9  4/11  4/15  4/19  4/23   Manual                   L/S STM      R shld x 10 min  R shld 5 min  R shoulder 5 min  -  -     HS stretch/ SKTC 30\"x3 ea       30\" x3 30\"x3 30\"x3 B   R shld PROM 10 min     15 mins  15 min  15 min  15 min  15min  15 min   Scap mobilization R                   Ther-Ex                   LTR 5\"x10     5\" 10x  5\" x10  5\"x10  5\" x10  5\"x10  5\"x10   Hip IR stretch                   SKTC c towel             Sub max RC iso - IR/ER/Ext/add/ abd 5\"X10 ea all dir       3\" x10 ea  3\"X10 ea  3\" x10 ea  3\"x10  3\"x10 ea   Pendulums      1 min eaF/B, S/S   1 min F/B, S/S  1 min F/B, S/S  1 min F/B, S/S  1 min F/B S/S  1 " "min  F/B  S/S   Ball roll on table Flx/scap 1 min ea       1 min fwd  fwd 2 min// scap 1 min  fwd 2 min// scap 1 min  fwd 2 min/scap 1 min  fwd 2 min/ scap   1 min   Digi  dc     Red x 2 min  black  black 2 min  black 2 min  black 2 min  black  2 min   Wrist/elbow AROM dc     15x ea  PRE sup/ pro, flex/ ext 2# x20 PRE sup/ pro, flex/ ext 2# x20  PRE sup/ pro, flex/ ext 2# x20  PRE sup/pro, flex/ext 2# x20  PRE sup/pro  Flex/ext 2#  x20   pulley 2' ea       --> -->   -->  ---->     Wand- flex/abd/ ER 1# 3-5\" x15 ea       --> -->   ---> --->     Nu-Step (no arms) L5 10 min       10 min  10 min L2  10 min L4  10 min L4  10 min  L4   Neuro Re-Ed                   Scap retractions 20x     20x  20x  20x  20x  20x  20x                TA setting -    5\" 20x 5\" x20 5\"x20 5\"x20 5\" x20 5\"x20   TA with march 20x    10x ea x10 2x10 2x10 2x10 2x10   TA with bridge 2x10     --> x10 x10 x10 x10   TA with walk out BKFO 10x ea     -->  BKFO x10 BKFO x10 BKFO  X10 B   TA with SLR 2x10 B     x10 x10 x10 x10 X10 B   Ther-Act              pt ed                                              Modalities                   Prone HP with TENs MHP 10 min post     HP to L/S 10 min   HP to L/S 10 min HP to L/S   X10 min                                                                                              "

## 2024-04-26 ENCOUNTER — HOSPITAL ENCOUNTER (OUTPATIENT)
Dept: MRI IMAGING | Facility: HOSPITAL | Age: 54
End: 2024-04-26
Payer: COMMERCIAL

## 2024-04-26 DIAGNOSIS — R51.9 CHRONIC INTRACTABLE HEADACHE, UNSPECIFIED HEADACHE TYPE: ICD-10-CM

## 2024-04-26 DIAGNOSIS — G43.109 MIGRAINE WITH AURA AND WITHOUT STATUS MIGRAINOSUS, NOT INTRACTABLE: ICD-10-CM

## 2024-04-26 DIAGNOSIS — G89.29 CHRONIC INTRACTABLE HEADACHE, UNSPECIFIED HEADACHE TYPE: ICD-10-CM

## 2024-04-26 PROCEDURE — 70551 MRI BRAIN STEM W/O DYE: CPT

## 2024-04-30 ENCOUNTER — OFFICE VISIT (OUTPATIENT)
Dept: PHYSICAL THERAPY | Facility: CLINIC | Age: 54
End: 2024-04-30
Payer: COMMERCIAL

## 2024-04-30 DIAGNOSIS — Z98.890 S/P RIGHT ROTATOR CUFF REPAIR: ICD-10-CM

## 2024-04-30 DIAGNOSIS — M54.16 LUMBAR RADICULOPATHY: ICD-10-CM

## 2024-04-30 DIAGNOSIS — R90.89 ABNORMAL BRAIN MRI: Primary | ICD-10-CM

## 2024-04-30 DIAGNOSIS — Z98.890 STATUS POST RIGHT ROTATOR CUFF REPAIR: ICD-10-CM

## 2024-04-30 DIAGNOSIS — G45.9 TIA (TRANSIENT ISCHEMIC ATTACK): ICD-10-CM

## 2024-04-30 DIAGNOSIS — M25.511 ACUTE PAIN OF RIGHT SHOULDER: Primary | ICD-10-CM

## 2024-04-30 PROCEDURE — 97110 THERAPEUTIC EXERCISES: CPT | Performed by: PHYSICAL THERAPIST

## 2024-04-30 PROCEDURE — 97140 MANUAL THERAPY 1/> REGIONS: CPT | Performed by: PHYSICAL THERAPIST

## 2024-04-30 NOTE — PROGRESS NOTES
"Daily Note     Today's date: 2024  Patient name: Christopher Smalls Jr.  : 1970  MRN: [id number]  Referring provider: Uziel Michael PA*  Dx:   Encounter Diagnosis     ICD-10-CM    1. Acute pain of right shoulder  M25.511       2. S/P right rotator cuff repair  Z98.890       3. Lumbar radiculopathy - Right  M54.16       4. Status post right rotator cuff repair  Z98.890                      Subjective: Patient notes stiffness in the right shoulder due to needing to move a desk which has resulted in pain in the shoulder.       Objective: See treatment diary below      Assessment: Tolerated treatment well. Patient would benefit from continued PT. Patient able to progress with AROM tasks into supine flexion reach and sidely ER and abduction of the shoulder.  Patient able to complete core stabilization exercise accurately with minimal pain complaints this date.       Plan: Continue per plan of care.      Precautions: Right RCR 3/13/24 - see protocol     Daily Treatment Diary:      Initial Evaluation Date: 24  Compliance 4/25 4/30    4/4  4/9  4/11  4/15  4/19  4/23   Visit Number 11 12    5  6  7  8  9  10   Re-Eval                   Foto Captured                          4/25 4/30    4/4  4/9  4/11  4/15  4/19  4/23   Manual                   L/S STM      R shld x 10 min  R shld 5 min  R shoulder 5 min  -  -     HS stretch/ SKTC 30\"x3 ea 30\" x3 ea      30\" x3 30\"x3 30\"x3 B   R shld PROM 10 min 10 min    15 mins  15 min  15 min  15 min  15min  15 min   Scap mobilization R                   Ther-Ex                   LTR 5\"x10 5\" x10    5\" 10x  5\" x10  5\"x10  5\" x10  5\"x10  5\"x10   Hip IR stretch                   Supine shld flex reach  x10           Sub max RC iso - IR/ER/Ext/add/ abd 5\"X10 ea all dir X20 as tolerated      3\" x10 ea  3\"X10 ea  3\" x10 ea  3\"x10  3\"x10 ea   Pendulums  1 min F/B. S/S    1 min eaF/B, S/S   1 min F/B, S/S  1 min F/B, S/S  1 min F/B, S/S  1 min F/B S/S  1 min  F/B  S/S " "  Ball roll on table Flx/scap 1 min ea Flex/ scap 1 min ea      1 min fwd  fwd 2 min// scap 1 min  fwd 2 min// scap 1 min  fwd 2 min/scap 1 min  fwd 2 min/ scap   1 min   Digi  dc -    Red x 2 min  black  black 2 min  black 2 min  black 2 min  black  2 min   Wrist/elbow AROM dc -    15x ea  PRE sup/ pro, flex/ ext 2# x20 PRE sup/ pro, flex/ ext 2# x20  PRE sup/ pro, flex/ ext 2# x20  PRE sup/pro, flex/ext 2# x20  PRE sup/pro  Flex/ext 2#  x20   pulley 2' ea Flex/ scap 2' ea      --> -->   -->  ---->     Wand- flex/abd/ ER 1# 3-5\" x15 ea 1# x15 ea      --> -->   ---> --->     Sidely ER  2x10           Sidely abd  2x8 with assist           Nu-Step (no arms) L5 10 min       10 min  10 min L2  10 min L4  10 min L4  10 min  L4   Neuro Re-Ed                   Scap retractions 20x 20x    20x  20x  20x  20x  20x  20x                TA setting -    5\" 20x 5\" x20 5\"x20 5\"x20 5\" x20 5\"x20   TA with march 20x 20x   10x ea x10 2x10 2x10 2x10 2x10   TA with bridge 2x10 2x10    --> x10 x10 x10 x10   TA with walk out BKFO 10x ea BKFO 10x ea    -->  BKFO x10 BKFO x10 BKFO  X10 B   TA with SLR 2x10 B 2x10 ea    x10 x10 x10 x10 X10 B   Ther-Act              pt ed                                              Modalities                   Prone HP with TENs MHP 10 min post MHP to L/S- ice to shld 10 min    HP to L/S 10 min   HP to L/S 10 min HP to L/S   X10 min                                                                                                "

## 2024-05-01 ENCOUNTER — LAB (OUTPATIENT)
Dept: LAB | Facility: HOSPITAL | Age: 54
End: 2024-05-01
Payer: COMMERCIAL

## 2024-05-01 DIAGNOSIS — I25.118 CORONARY ARTERY DISEASE OF NATIVE ARTERY OF NATIVE HEART WITH STABLE ANGINA PECTORIS (HCC): ICD-10-CM

## 2024-05-01 DIAGNOSIS — E78.2 MIXED HYPERLIPIDEMIA: ICD-10-CM

## 2024-05-01 DIAGNOSIS — R90.89 ABNORMAL BRAIN MRI: ICD-10-CM

## 2024-05-01 DIAGNOSIS — I10 ESSENTIAL HYPERTENSION: ICD-10-CM

## 2024-05-01 DIAGNOSIS — R73.03 PREDIABETES: ICD-10-CM

## 2024-05-01 LAB
BASOPHILS # BLD AUTO: 0.05 THOUSANDS/ÂΜL (ref 0–0.1)
BASOPHILS NFR BLD AUTO: 1 % (ref 0–1)
CHOLEST SERPL-MCNC: 116 MG/DL
CREAT UR-MCNC: 394.6 MG/DL
EOSINOPHIL # BLD AUTO: 0.13 THOUSAND/ÂΜL (ref 0–0.61)
EOSINOPHIL NFR BLD AUTO: 3 % (ref 0–6)
ERYTHROCYTE [DISTWIDTH] IN BLOOD BY AUTOMATED COUNT: 13.3 % (ref 11.6–15.1)
HCT VFR BLD AUTO: 42 % (ref 36.5–49.3)
HDLC SERPL-MCNC: 34 MG/DL
HGB BLD-MCNC: 13.8 G/DL (ref 12–17)
IMM GRANULOCYTES # BLD AUTO: 0.01 THOUSAND/UL (ref 0–0.2)
IMM GRANULOCYTES NFR BLD AUTO: 0 % (ref 0–2)
LDLC SERPL CALC-MCNC: 55 MG/DL (ref 0–100)
LYMPHOCYTES # BLD AUTO: 2.56 THOUSANDS/ÂΜL (ref 0.6–4.47)
LYMPHOCYTES NFR BLD AUTO: 59 % (ref 14–44)
MCH RBC QN AUTO: 29.2 PG (ref 26.8–34.3)
MCHC RBC AUTO-ENTMCNC: 32.9 G/DL (ref 31.4–37.4)
MCV RBC AUTO: 89 FL (ref 82–98)
MICROALBUMIN UR-MCNC: 13.9 MG/L
MICROALBUMIN/CREAT 24H UR: 4 MG/G CREATININE (ref 0–30)
MONOCYTES # BLD AUTO: 0.33 THOUSAND/ÂΜL (ref 0.17–1.22)
MONOCYTES NFR BLD AUTO: 8 % (ref 4–12)
NEUTROPHILS # BLD AUTO: 1.24 THOUSANDS/ÂΜL (ref 1.85–7.62)
NEUTS SEG NFR BLD AUTO: 29 % (ref 43–75)
NONHDLC SERPL-MCNC: 82 MG/DL
NRBC BLD AUTO-RTO: 0 /100 WBCS
PLATELET # BLD AUTO: 258 THOUSANDS/UL (ref 149–390)
PMV BLD AUTO: 10.3 FL (ref 8.9–12.7)
RBC # BLD AUTO: 4.72 MILLION/UL (ref 3.88–5.62)
TRIGL SERPL-MCNC: 137 MG/DL
WBC # BLD AUTO: 4.32 THOUSAND/UL (ref 4.31–10.16)

## 2024-05-01 PROCEDURE — 85025 COMPLETE CBC W/AUTO DIFF WBC: CPT

## 2024-05-01 PROCEDURE — 80061 LIPID PANEL: CPT

## 2024-05-01 PROCEDURE — 80053 COMPREHEN METABOLIC PANEL: CPT

## 2024-05-01 PROCEDURE — 82570 ASSAY OF URINE CREATININE: CPT

## 2024-05-01 PROCEDURE — 83036 HEMOGLOBIN GLYCOSYLATED A1C: CPT

## 2024-05-01 PROCEDURE — 36415 COLL VENOUS BLD VENIPUNCTURE: CPT

## 2024-05-01 PROCEDURE — 82043 UR ALBUMIN QUANTITATIVE: CPT

## 2024-05-02 ENCOUNTER — OFFICE VISIT (OUTPATIENT)
Dept: PHYSICAL THERAPY | Facility: CLINIC | Age: 54
End: 2024-05-02
Payer: COMMERCIAL

## 2024-05-02 DIAGNOSIS — M54.16 LUMBAR RADICULOPATHY: ICD-10-CM

## 2024-05-02 DIAGNOSIS — Z98.890 STATUS POST RIGHT ROTATOR CUFF REPAIR: ICD-10-CM

## 2024-05-02 DIAGNOSIS — Z98.890 S/P RIGHT ROTATOR CUFF REPAIR: ICD-10-CM

## 2024-05-02 DIAGNOSIS — M25.511 ACUTE PAIN OF RIGHT SHOULDER: Primary | ICD-10-CM

## 2024-05-02 LAB
EST. AVERAGE GLUCOSE BLD GHB EST-MCNC: 140 MG/DL
HBA1C MFR BLD: 6.5 %

## 2024-05-02 PROCEDURE — 97110 THERAPEUTIC EXERCISES: CPT

## 2024-05-02 PROCEDURE — 3044F HG A1C LEVEL LT 7.0%: CPT | Performed by: INTERNAL MEDICINE

## 2024-05-02 PROCEDURE — 97140 MANUAL THERAPY 1/> REGIONS: CPT

## 2024-05-02 PROCEDURE — 3044F HG A1C LEVEL LT 7.0%: CPT

## 2024-05-02 PROCEDURE — 97112 NEUROMUSCULAR REEDUCATION: CPT

## 2024-05-02 NOTE — PROGRESS NOTES
"Daily Note     Today's date: 2024  Patient name: Christopher Smalls Jr.  : 1970  MRN: [id number]  Referring provider: Uziel Michael PA*  Dx:   Encounter Diagnosis     ICD-10-CM    1. Acute pain of right shoulder  M25.511       2. S/P right rotator cuff repair  Z98.890       3. Lumbar radiculopathy - Right  M54.16       4. Status post right rotator cuff repair  Z98.890                      Subjective: Patient offers no new complaints at start of session.       Objective: See treatment diary below      Assessment: Tolerated treatment well without complaint. He continues to required moderate verbal cues to perform exercises with appropriate technique and intensity. Patient would benefit from continued PT to increase R shoulder strength and mobility as well as core stability for improved function in daily activities.      Plan: Continue per plan of care.      Precautions: Right RCR 3/13/24 - see protocol     Daily Treatment Diary:      Initial Evaluation Date: 24  Compliance 4/25 4/30 5/2     4/11  4/15  4/19  4/23   Visit Number 11 12 13     7  8  9  10   Re-Eval                 Foto Captured                        4/25 4/30 5/2     4/11  4/15  4/19  4/23   Manual                 L/S STM        R shoulder 5 min  -  -     HS stretch/ SKTC 30\"x3 ea 30\" x3 ea      30\" x3 30\"x3 30\"x3 B   R shld PROM 10 min 10 min 10 min     15 min  15 min  15min  15 min   Scap mobilization R                 Ther-Ex                 LTR 5\"x10 5\" x10 5\"x10     5\"x10  5\" x10  5\"x10  5\"x10   Hip IR stretch                 Supine shld flex reach  x10 x10          Sub max RC iso - IR/ER/Ext/add/ abd 5\"X10 ea all dir X20 as tolerated 20x ea     3\"X10 ea  3\" x10 ea  3\"x10  3\"x10 ea   Pendulums  1 min F/B. S/S 1 min ea     1 min F/B, S/S  1 min F/B, S/S  1 min F/B S/S  1 min  F/B  S/S   Ball roll on table Flx/scap 1 min ea Flex/ scap 1 min ea Flex/ scap 1 min ea     fwd 2 min// scap 1 min  fwd 2 min// scap 1 min  fwd 2 min/scap 1 " "min  fwd 2 min/ scap   1 min   Digi  dc -      black 2 min  black 2 min  black 2 min  black  2 min   Wrist/elbow AROM dc -     PRE sup/ pro, flex/ ext 2# x20  PRE sup/ pro, flex/ ext 2# x20  PRE sup/pro, flex/ext 2# x20  PRE sup/pro  Flex/ext 2#  x20   pulley 2' ea Flex/ scap 2' ea Flex/ scap 2' ea    -->   -->  ---->     Wand- flex/abd/ ER 1# 3-5\" x15 ea 1# x15 ea 1# x15 ea    -->   ---> --->     Sidely ER  2x10 2x10          Sidely abd  2x8 with assist 2x10 min A          Nu-Step (no arms) L5 10 min       10 min L2  10 min L4  10 min L4  10 min  L4   Neuro Re-Ed                 Scap retractions 20x 20x 20x     20x  20x  20x  20x                TA setting -      5\"x20 5\"x20 5\" x20 5\"x20   TA with march 20x 20x 20x    2x10 2x10 2x10 2x10   TA with bridge 2x10 2x10 2x10    x10 x10 x10 x10   TA with walk out BKFO 10x ea BKFO 10x ea BKFO 10x ea     BKFO x10 BKFO x10 BKFO  X10 B   TA with SLR 2x10 B 2x10 ea 2x10 ea    x10 x10 x10 X10 B   Ther-Act              pt ed                                            Modalities                 Prone HP with TENs MHP 10 min post MHP to L/S- ice to shld 10 min P R shoulder 10 min post      HP to L/S 10 min HP to L/S   X10 min                                                                                                "

## 2024-05-04 ENCOUNTER — HOSPITAL ENCOUNTER (OUTPATIENT)
Dept: CT IMAGING | Facility: HOSPITAL | Age: 54
Discharge: HOME | End: 2024-05-04
Payer: COMMERCIAL

## 2024-05-04 DIAGNOSIS — R90.89 ABNORMAL BRAIN MRI: ICD-10-CM

## 2024-05-04 DIAGNOSIS — G45.9 TIA (TRANSIENT ISCHEMIC ATTACK): ICD-10-CM

## 2024-05-04 PROCEDURE — 70498 CT ANGIOGRAPHY NECK: CPT

## 2024-05-04 PROCEDURE — 70496 CT ANGIOGRAPHY HEAD: CPT

## 2024-05-04 RX ADMIN — IOHEXOL 100 ML: 350 INJECTION, SOLUTION INTRAVENOUS at 08:04

## 2024-05-07 ENCOUNTER — EVALUATION (OUTPATIENT)
Dept: PHYSICAL THERAPY | Facility: CLINIC | Age: 54
End: 2024-05-07
Payer: COMMERCIAL

## 2024-05-07 DIAGNOSIS — Z98.890 S/P RIGHT ROTATOR CUFF REPAIR: ICD-10-CM

## 2024-05-07 DIAGNOSIS — Z98.890 STATUS POST RIGHT ROTATOR CUFF REPAIR: ICD-10-CM

## 2024-05-07 DIAGNOSIS — M25.511 ACUTE PAIN OF RIGHT SHOULDER: Primary | ICD-10-CM

## 2024-05-07 DIAGNOSIS — M54.16 LUMBAR RADICULOPATHY: ICD-10-CM

## 2024-05-07 PROCEDURE — 97140 MANUAL THERAPY 1/> REGIONS: CPT | Performed by: PHYSICAL THERAPIST

## 2024-05-07 PROCEDURE — 97112 NEUROMUSCULAR REEDUCATION: CPT | Performed by: PHYSICAL THERAPIST

## 2024-05-07 PROCEDURE — 97110 THERAPEUTIC EXERCISES: CPT | Performed by: PHYSICAL THERAPIST

## 2024-05-07 NOTE — PROGRESS NOTES
PT Re-Evaluation     Today's date: 2024  Patient name: Christopher Smalls Jr.  : 1970  MRN: [id number]  Referring provider: Uziel Michael PA*  Dx:   Encounter Diagnosis     ICD-10-CM    1. Acute pain of right shoulder  M25.511       2. S/P right rotator cuff repair  Z98.890       3. Lumbar radiculopathy - Right  M54.16       4. Status post right rotator cuff repair  Z98.890                      Assessment  Assessment details: Patient is a 54 y.o. male who presents to PT with a history of LBP with RLE radicular symptoms and right rotator cuff tear. He demonstrates a painful and limited AROM of the L/S. Patient demonstrates atrophy of the right quad and muscular hypertonicity of the L/S erector spinae. The RLE is weaker than the LLE. Patient is antalgic with transfers from sit to stand and sit to supine. Patient demonstrates decreased AROM and PROM of the right shoulder as well as poor strength. He was instruction with pendulum exercise as well as gentle L/S AROM for his HEP. He is expected to respond well to PT intervention.    UPDATE 24  Patient is progressing nicely toward all goals with increasing shoulder AROM/ PROM and decreasing pain complaints. Patient is responding well to PT intervention overall. L/S pain symptoms are intermittent but improved overall. He is able to complete core stabilization exercise appropriately at this time.    Impairments: abnormal or restricted ROM, activity intolerance, impaired physical strength, lacks appropriate home exercise program and pain with function    Symptom irritability: highUnderstanding of Dx/Px/POC: excellent  Goals  STG - 4 weeks  Patient able to demonstrate increased L/S AROM by 25 %.  Patient to report pain at worst 5/10 in the L/S with daily activity.  Patient to be independent with HEP.  Patient able to complete self dressing tasks of the upper body without assistance following R shld surgery.  Patient able to complete hygiene tasks without  assistance.  Patient to have increased right shoulder PROM to 90degrees into flexion.    LTG - 16 weeks  Patient able to demonstrate painfree AROM fo the L/S.  Patient able to demonstrate good posture in sitting and standing.  Patient able to demonstrate proper body mechanics for lifting and carrying items.  Patient to demonstrate core strength at  4+/5 .  Patient able to demonstrate strength of the right shoulder into flexion/ abduction to 4+/5.  Patient to demonstrate increased strength of the RIGHT shoulder into ER to 4+/5.  Patient able to demonstrate RIGHT shoulder AROM to WNL for completion of functional tasks.  Patient to note pain at worst with activity to be 1/10 SPR.  Patient to demonstrate posture in sitting and standing WNL.  Patient to be d/c to final/ revised HEP.     Plan  Plan details: Patient's evaluation is completed. Patient was instructed with a HEP this date. Patient has scheduled further PT sessions for treatment.    Patient would benefit from: PT eval and skilled physical therapy  Planned modality interventions: TENS, thermotherapy: hydrocollator packs, cryotherapy and electrical stimulation/Russian stimulation  Planned therapy interventions: manual therapy, neuromuscular re-education, therapeutic activities, therapeutic exercise and home exercise program  Frequency: 2x week  Duration in weeks: 8  Plan of Care beginning date: 5/7/2024  Plan of Care expiration date: 7/2/2024  Treatment plan discussed with: patient      Subjective Evaluation    History of Present Illness  Mechanism of injury: Patient notes that he has had pain in the back for about 6 months. He notes that he does have radicular RLE - tingling to the foot constant. He notes that sitting is somewhat worse than standing. Knees are painful at times. He notes that sleeping is challenging due to pain in the back. He is a  - so moves trailer around. He typically work 8-10 hours. Switching from Gabapentin to Lyrica.      Will undergo a R RCR on 3/14/24 with Dr. Blue. He notes he has had pain for over a year. As per- MRI - Complete insertional tear of the supraspinatus, with torn tendon edge retracted to the level of the humeral head apex. Patient is unsure how he Injured the shoulder but attributes it to wear and tear.     UPDATE 24  Patient notes he does have difficulty with reaching OH with the right shoulder. He is able to dress independently but does have trouble taking off a jacket at times. He notes LBP with walking along the waist line. He notes sleep disturbances due to the shoulder does persist intermittently.   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)  Treatments  Previous treatment: physical therapy      Objective     Active Range of Motion     Right Shoulder   Flexion: 134 degrees with pain  Extension: 60 degrees with pain  Abduction: 114 degrees with pain  External rotation 0°: 62 degrees with pain    Lumbar   Flexion:  with pain Restriction level: minimal  Extension:  with pain Restriction level: moderate  Left lateral flexion:  with pain Restriction level: minimal  Right lateral flexion:  with pain Restriction level: minimal  Left rotation:  with pain Restriction level: minimal  Right rotation:  with pain Restriction level: minimal    Additional Active Range of Motion Details  Tenderness diffusely throughout the lumbar region with palpation to musculature. No point specific pain with palpation.     Passive Range of Motion     Right Shoulder  "  Flexion: 145 degrees   Extension: 60 degrees   Abduction: 120 degrees   External rotation 0°: 60 degrees     Strength/Myotome Testing     Right Shoulder     Planes of Motion   Flexion: 3   Extension: 4   Abduction: 2+   Adduction: 3+   External rotation at 0°: 3-   Internal rotation at 0°: 3+     Left Hip   Planes of Motion   Flexion: 4  Extension: 4  Abduction: 4  Adduction: 4  External rotation: 4  Internal rotation: 4    Right Hip   Planes of Motion   Flexion: 4-  Extension: 4-  Abduction: 4-  Adduction: 4  External rotation: 4  Internal rotation: 4    Left Knee   Flexion: 4+  Extension: 4+    Right Knee   Flexion: 4  Extension: 4    Left Ankle/Foot   Dorsiflexion: 4+  Plantar flexion: 4+    Right Ankle/Foot   Dorsiflexion: 4  Plantar flexion: 4             Precautions: Right RCR 3/13/24 - see protocol     Daily Treatment Diary:      Initial Evaluation Date: 03/04/24  Compliance 4/25 4/30 5/2 5/7    4/11  4/15  4/19  4/23   Visit Number 11 12 13 14    7  8  9  10   Re-Eval     Y            Foto Captured   Y                     4/25 4/30 5/2 5/7    4/11  4/15  4/19  4/23   Manual                 L/S STM        R shoulder 5 min  -  -     HS stretch/ SKTC 30\"x3 ea 30\" x3 ea      30\" x3 30\"x3 30\"x3 B   R shld PROM 10 min 10 min 10 min 10 min    15 min  15 min  15min  15 min   Scap mobilization R                 Ther-Ex                 LTR 5\"x10 5\" x10 5\"x10 5\" x10    5\"x10  5\" x10  5\"x10  5\"x10   Hip IR stretch                 Supine shld flex reach  x10 x10 x10         Sub max RC iso - IR/ER/Ext/add/ abd 5\"X10 ea all dir X20 as tolerated 20x ea Light resistded MRE x20    3\"X10 ea  3\" x10 ea  3\"x10  3\"x10 ea   Pendulums  1 min F/B. S/S 1 min ea 1 min ea    1 min F/B, S/S  1 min F/B, S/S  1 min F/B S/S  1 min  F/B  S/S   Ball roll on table Flx/scap 1 min ea Flex/ scap 1 min ea Flex/ scap 1 min ea Flex/ scap 1 min     fwd 2 min// scap 1 min  fwd 2 min// scap 1 min  fwd 2 min/scap 1 min  fwd 2 min/ scap   1 min   Digi " " dc -      black 2 min  black 2 min  black 2 min  black  2 min   Wrist/elbow AROM dc -     PRE sup/ pro, flex/ ext 2# x20  PRE sup/ pro, flex/ ext 2# x20  PRE sup/pro, flex/ext 2# x20  PRE sup/pro  Flex/ext 2#  x20   pulley 2' ea Flex/ scap 2' ea Flex/ scap 2' ea Flex/ scap 2 min   -->   -->  ---->     Wand- flex/abd/ ER 1# 3-5\" x15 ea 1# x15 ea 1# x15 ea    -->   ---> --->     Sidely ER  2x10 2x10 1# 2x10         Sidely abd  2x8 with assist 2x10 min A 1# 2x10         Fwd/ scap/ abd reach    1# x10         Bicep curl    2# 2x10         UBE             Nu-Step (no arms) L5 10 min   L6 10 min    10 min L2  10 min L4  10 min L4  10 min  L4   Neuro Re-Ed                 Scap retractions 20x 20x 20x 20x    20x  20x  20x  20x   Prone shld- ext/ abd/ flex    -->                                                TA setting -      5\"x20 5\"x20 5\" x20 5\"x20   TA with march 20x 20x 20x 20x   2x10 2x10 2x10 2x10   TA with bridge 2x10 2x10 2x10 2x10   x10 x10 x10 x10   TA with walk out BKFO 10x ea BKFO 10x ea BKFO 10x ea BKFO 10x ea    BKFO x10 BKFO x10 BKFO  X10 B   TA with SLR 2x10 B 2x10 ea 2x10 ea 2x10 ea   x10 x10 x10 X10 B   Ther-Act              pt ed                                            Modalities                 Prone HP with TENs MHP 10 min post MHP to L/S- ice to shld 10 min P R shoulder 10 min post      HP to L/S 10 min HP to L/S   X10 min                                                      "

## 2024-05-08 LAB
ALBUMIN SERPL BCP-MCNC: 4.3 G/DL (ref 3.5–5)
ALP SERPL-CCNC: 105 U/L (ref 34–104)
ALT SERPL W P-5'-P-CCNC: 17 U/L (ref 7–52)
ANION GAP SERPL CALCULATED.3IONS-SCNC: 8 MMOL/L (ref 4–13)
AST SERPL W P-5'-P-CCNC: 19 U/L (ref 13–39)
BILIRUB SERPL-MCNC: 0.62 MG/DL (ref 0.2–1)
BUN SERPL-MCNC: 9 MG/DL (ref 5–25)
CALCIUM SERPL-MCNC: 9.2 MG/DL (ref 8.4–10.2)
CHLORIDE SERPL-SCNC: 106 MMOL/L (ref 96–108)
CO2 SERPL-SCNC: 25 MMOL/L (ref 21–32)
CREAT SERPL-MCNC: 0.9 MG/DL (ref 0.6–1.3)
GFR SERPL CREATININE-BSD FRML MDRD: 96 ML/MIN/1.73SQ M
GLUCOSE P FAST SERPL-MCNC: 104 MG/DL (ref 65–99)
POTASSIUM SERPL-SCNC: 4 MMOL/L (ref 3.5–5.3)
PROT SERPL-MCNC: 7.5 G/DL (ref 6.4–8.4)
SODIUM SERPL-SCNC: 139 MMOL/L (ref 135–147)

## 2024-05-09 ENCOUNTER — OFFICE VISIT (OUTPATIENT)
Dept: PHYSICAL THERAPY | Facility: CLINIC | Age: 54
End: 2024-05-09
Payer: COMMERCIAL

## 2024-05-09 DIAGNOSIS — Z98.890 STATUS POST RIGHT ROTATOR CUFF REPAIR: ICD-10-CM

## 2024-05-09 DIAGNOSIS — M54.16 LUMBAR RADICULOPATHY: ICD-10-CM

## 2024-05-09 DIAGNOSIS — M25.511 ACUTE PAIN OF RIGHT SHOULDER: Primary | ICD-10-CM

## 2024-05-09 DIAGNOSIS — Z98.890 S/P RIGHT ROTATOR CUFF REPAIR: ICD-10-CM

## 2024-05-09 PROCEDURE — 97110 THERAPEUTIC EXERCISES: CPT | Performed by: PHYSICAL THERAPIST

## 2024-05-09 PROCEDURE — 97112 NEUROMUSCULAR REEDUCATION: CPT

## 2024-05-09 PROCEDURE — 97140 MANUAL THERAPY 1/> REGIONS: CPT | Performed by: PHYSICAL THERAPIST

## 2024-05-09 NOTE — PROGRESS NOTES
"Daily Note     Today's date: 2024  Patient name: Christopher Smalls Jr.  : 1970  MRN: [id number]  Referring provider: Uziel Michael PA*  Dx:   Encounter Diagnosis     ICD-10-CM    1. Acute pain of right shoulder  M25.511       2. S/P right rotator cuff repair  Z98.890       3. Lumbar radiculopathy - Right  M54.16       4. Status post right rotator cuff repair  Z98.890                      Subjective: Patient notes pain at 5/10 in the back possibly due to standing doing the dishes. He notes that the right shoulder is doing well.       Objective: See treatment diary below      Assessment: Tolerated treatment well. Patient would benefit from continued PT. Patient with increased pain in the L/S. She is able to complete right shoulder AROM with minimal fatigue. Mild pain noted with active motion into abduction that resolved with rest.       Plan: Continue per plan of care.      Precautions: Right RCR 3/13/24 - see protocol     Daily Treatment Diary:      Initial Evaluation Date: 24  Compliance 4/25 4/30 5/2 5/7 5/9    4/15  4/19  4/23   Visit Number 11 12 13 14 15    8  9  10   Re-Eval     Y            Foto Captured   Y                     7 5/9    4/15  4/19  4/23   Manual                 L/S STM         -  -     HS stretch/ SKTC 30\"x3 ea 30\" x3 ea      30\" x3 30\"x3 30\"x3 B   R shld PROM 10 min 10 min 10 min 10 min 10 min    15 min  15min  15 min   Scap mobilization R                 Ther-Ex                 LTR 5\"x10 5\" x10 5\"x10 5\" x10 5\" x10    5\" x10  5\"x10  5\"x10   Hip IR stretch                 Supine shld flex reach  x10 x10 x10 x10        Sub max RC iso - IR/ER/Ext/add/ abd 5\"X10 ea all dir X20 as tolerated 20x ea Light resistded MRE x20 Light resisted MRE x20    3\" x10 ea  3\"x10  3\"x10 ea   Pendulums  1 min F/B. S/S 1 min ea 1 min ea 1 min ea    1 min F/B, S/S  1 min F/B S/S  1 min  F/B  S/S   Ball roll on table Flx/scap 1 min ea Flex/ scap 1 min ea Flex/ scap 1 min ea Flex/ " "scap 1 min  Flex/ scap 1 min ea    fwd 2 min// scap 1 min  fwd 2 min/scap 1 min  fwd 2 min/ scap   1 min   Digi  dc -       black 2 min  black 2 min  black  2 min   Wrist/elbow AROM dc -       PRE sup/ pro, flex/ ext 2# x20  PRE sup/pro, flex/ext 2# x20  PRE sup/pro  Flex/ext 2#  x20   pulley 2' ea Flex/ scap 2' ea Flex/ scap 2' ea Flex/ scap 2 min Flex/ scap 2 min    -->  ---->     Wand- flex/abd/ ER 1# 3-5\" x15 ea 1# x15 ea 1# x15 ea      ---> --->     Sidely ER  2x10 2x10 1# 2x10 1# 2x10        Sidely abd  2x8 with assist 2x10 min A 1# 2x10 2x10        Fwd/ scap/ abd reach    1# x10 0# 2x10        Bicep curl    2# 2x10 2# 2x10        UBE             Nu-Step (no arms) L5 10 min   L6 10 min L6 10 min    10 min L4  10 min L4  10 min  L4   Neuro Re-Ed                 Scap retractions 20x 20x 20x 20x     20x  20x  20x   Prone shld- ext/ abd/ flex    -->                                                TA setting -       5\"x20 5\" x20 5\"x20   TA with march 20x 20x 20x 20x 20x   2x10 2x10 2x10   TA with bridge 2x10 2x10 2x10 2x10 2x10   x10 x10 x10   TA with walk out BKFO 10x ea BKFO 10x ea BKFO 10x ea BKFO 10x ea BKFO 10x   BKFO x10 BKFO x10 BKFO  X10 B   TA with SLR 2x10 B 2x10 ea 2x10 ea 2x10 ea 2x10   x10 x10 X10 B   Ther-Act              pt ed                                            Modalities                 Prone HP with TENs MHP 10 min post MHP to L/S- ice to shld 10 min P R shoulder 10 min post      HP to L/S 10 min HP to L/S   X10 min                                                      "

## 2024-05-14 ENCOUNTER — OFFICE VISIT (OUTPATIENT)
Dept: PHYSICAL THERAPY | Facility: CLINIC | Age: 54
End: 2024-05-14
Payer: COMMERCIAL

## 2024-05-14 DIAGNOSIS — Z98.890 STATUS POST RIGHT ROTATOR CUFF REPAIR: ICD-10-CM

## 2024-05-14 DIAGNOSIS — M54.16 LUMBAR RADICULOPATHY: ICD-10-CM

## 2024-05-14 DIAGNOSIS — Z98.890 S/P RIGHT ROTATOR CUFF REPAIR: ICD-10-CM

## 2024-05-14 DIAGNOSIS — M25.511 ACUTE PAIN OF RIGHT SHOULDER: Primary | ICD-10-CM

## 2024-05-14 PROCEDURE — 97140 MANUAL THERAPY 1/> REGIONS: CPT

## 2024-05-14 PROCEDURE — 97112 NEUROMUSCULAR REEDUCATION: CPT

## 2024-05-14 PROCEDURE — 97110 THERAPEUTIC EXERCISES: CPT

## 2024-05-14 NOTE — PROGRESS NOTES
"Daily Note     Today's date: 2024  Patient name: Christopher Smalls Jr.  : 1970  MRN: [id number]  Referring provider: Uziel Michael PA*  Dx:   Encounter Diagnosis     ICD-10-CM    1. Acute pain of right shoulder  M25.511       2. S/P right rotator cuff repair  Z98.890       3. Lumbar radiculopathy - Right  M54.16       4. Status post right rotator cuff repair  Z98.890                      Subjective: Patient reports R shoulder was sore yesterday and he is unsure why as he has not changed his activity.      Objective: See treatment diary below      Assessment: Tolerated treatment well without complaint. Patient required moderate verbal cues to perform exercises with appropriate technique and intensity. Patient would benefit from continued PT to increase R shoulder and core strength for improved function in ADLs.      Plan: Continue per plan of care.      Precautions: Right RCR 3/13/24 - see protocol     Daily Treatment Diary:      Initial Evaluation Date: 24  Compliance 4/25 4/30 5/2 5/7 5/9 5/14   4/15  4/19  4/23   Visit Number 11 12 13 14 15 16   8  9  10   Re-Eval     Y            Foto Captured   Y                     4/25 4/30 5/2 5/7 5/9 5/14   4/15  4/19  4/23   Manual                 L/S STM         -  -     HS stretch/ SKTC 30\"x3 ea 30\" x3 ea      30\" x3 30\"x3 30\"x3 B   R shld PROM 10 min 10 min 10 min 10 min 10 min 10 min   15 min  15min  15 min   Scap mobilization R                 Ther-Ex                 LTR 5\"x10 5\" x10 5\"x10 5\" x10 5\" x10 5\"x10   5\" x10  5\"x10  5\"x10   Hip IR stretch                 Supine shld flex reach  x10 x10 x10 x10 x10       Sub max RC iso - IR/ER/Ext/add/ abd 5\"X10 ea all dir X20 as tolerated 20x ea Light resistded MRE x20 Light resisted MRE x20 light resisted MRE x20   3\" x10 ea  3\"x10  3\"x10 ea   Pendulums  1 min F/B. S/S 1 min ea 1 min ea 1 min ea 20x ea   1 min F/B, S/S  1 min F/B S/S  1 min  F/B  S/S   Ball roll on table Flx/scap 1 min ea Flex/ scap 1 " "min ea Flex/ scap 1 min ea Flex/ scap 1 min  Flex/ scap 1 min ea Flex/ scap 1 min ea   fwd 2 min// scap 1 min  fwd 2 min/scap 1 min  fwd 2 min/ scap   1 min   Digi  dc -       black 2 min  black 2 min  black  2 min   Wrist/elbow AROM dc -       PRE sup/ pro, flex/ ext 2# x20  PRE sup/pro, flex/ext 2# x20  PRE sup/pro  Flex/ext 2#  x20   pulley 2' ea Flex/ scap 2' ea Flex/ scap 2' ea Flex/ scap 2 min Flex/ scap 2 min Flex/ scap 2 min   -->  ---->     Wand- flex/abd/ ER 1# 3-5\" x15 ea 1# x15 ea 1# x15 ea      ---> --->     Sidely ER  2x10 2x10 1# 2x10 1# 2x10 1# 2x10       Sidely abd  2x8 with assist 2x10 min A 1# 2x10 2x10 1# 2x10       Fwd/ scap/ abd reach    1# x10 0# 2x10 0# 2x10       Bicep curl    2# 2x10 2# 2x10  2# 2x10       UBE             Nu-Step (no arms) L5 10 min   L6 10 min L6 10 min np   10 min L4  10 min L4  10 min  L4   Neuro Re-Ed                 Scap retractions 20x 20x 20x 20x     20x  20x  20x   Prone shld- ext/ abd/ flex    -->                                                TA setting -       5\"x20 5\" x20 5\"x20   TA with march 20x 20x 20x 20x 20x 20x  2x10 2x10 2x10   TA with bridge 2x10 2x10 2x10 2x10 2x10 2x10  x10 x10 x10   TA with walk out BKFO 10x ea BKFO 10x ea BKFO 10x ea BKFO 10x ea BKFO 10x BKFO 10x  BKFO x10 BKFO x10 BKFO  X10 B   TA with SLR 2x10 B 2x10 ea 2x10 ea 2x10 ea 2x10 2x10 ea  x10 x10 X10 B   Ther-Act              pt ed                                            Modalities                 Prone HP with TENs MHP 10 min post MHP to L/S- ice to shld 10 min P R shoulder 10 min post      HP to L/S 10 min HP to L/S   X10 min                                                        "

## 2024-05-16 ENCOUNTER — OFFICE VISIT (OUTPATIENT)
Dept: PHYSICAL THERAPY | Facility: CLINIC | Age: 54
End: 2024-05-16
Payer: COMMERCIAL

## 2024-05-16 DIAGNOSIS — Z98.890 STATUS POST RIGHT ROTATOR CUFF REPAIR: ICD-10-CM

## 2024-05-16 DIAGNOSIS — M25.511 ACUTE PAIN OF RIGHT SHOULDER: Primary | ICD-10-CM

## 2024-05-16 DIAGNOSIS — M54.16 LUMBAR RADICULOPATHY: ICD-10-CM

## 2024-05-16 DIAGNOSIS — Z98.890 S/P RIGHT ROTATOR CUFF REPAIR: ICD-10-CM

## 2024-05-16 PROCEDURE — 97110 THERAPEUTIC EXERCISES: CPT

## 2024-05-16 PROCEDURE — 97140 MANUAL THERAPY 1/> REGIONS: CPT

## 2024-05-16 PROCEDURE — 97112 NEUROMUSCULAR REEDUCATION: CPT

## 2024-05-16 NOTE — PROGRESS NOTES
"Daily Note     Today's date: 2024  Patient name: Christopher Smalls Jr.  : 1970  MRN: [id number]  Referring provider: Uziel Michael PA*  Dx:   Encounter Diagnosis     ICD-10-CM    1. Acute pain of right shoulder  M25.511       2. S/P right rotator cuff repair  Z98.890       3. Lumbar radiculopathy - Right  M54.16       4. Status post right rotator cuff repair  Z98.890           Start Time: 0815  Stop Time: 0900  Total time in clinic (min): 45 minutes    Subjective: Patient reports 0/10 pain, but feels like his both legs are tight.      Objective: See treatment diary below      Assessment: Tolerated treatment well.   Patient participated in skilled PT session focused on strengthening, stretching, and ROM.  Patient able to complete exercise program with no issues.  Patient continues to demonstrate decreased R shoulder AROM.  Patient demonstrates improved core stabilization with exercise this session with less v.c.  Patient would continue to benefit from skilled PT interventions to address strengthening, stretching, and ROM. Patient demonstrated fatigue post treatment      Plan: Continue per plan of care.      Precautions: Right RCR 3/13/24 - see protocol     Daily Treatment Diary:      Initial Evaluation Date: 24  Compliance 4/25 4/30 5/2 5/7 5/9 5/14 5/16  4/15  4/19  4/23   Visit Number 11 12 13 14 15 16 17  8  9  10   Re-Eval     Y            Foto Captured   Y                      5/2 5/7 5 516  4/15  4/19  4/23   Manual                 L/S STM         -  -     HS stretch/ SKTC 30\"x3 ea 30\" x3 ea      30\" x3 30\"x3 30\"x3 B   R shld PROM 10 min 10 min 10 min 10 min 10 min 10 min 10 min  15 min  15min  15 min   Scap mobilization R                 Ther-Ex                 LTR 5\"x10 5\" x10 5\"x10 5\" x10 5\" x10 5\"x10 5\" 10x   5\" x10  5\"x10  5\"x10   Hip IR stretch                 Supine shld flex reach  x10 x10 x10 x10 x10 10x      Sub max RC iso - IR/ER/Ext/add/ abd 5\"X10 ea all dir X20 " "as tolerated 20x ea Light resistded MRE x20 Light resisted MRE x20 light resisted MRE x20   3\" x10 ea  3\"x10  3\"x10 ea   Pendulums  1 min F/B. S/S 1 min ea 1 min ea 1 min ea 20x ea 20x ea  1 min F/B, S/S  1 min F/B S/S  1 min  F/B  S/S   Ball roll on table Flx/scap 1 min ea Flex/ scap 1 min ea Flex/ scap 1 min ea Flex/ scap 1 min  Flex/ scap 1 min ea Flex/ scap 1 min ea Flex/scap x 1 min ea  fwd 2 min// scap 1 min  fwd 2 min/scap 1 min  fwd 2 min/ scap   1 min   Digi  dc -       black 2 min  black 2 min  black  2 min   Wrist/elbow AROM dc -       PRE sup/ pro, flex/ ext 2# x20  PRE sup/pro, flex/ext 2# x20  PRE sup/pro  Flex/ext 2#  x20   pulley 2' ea Flex/ scap 2' ea Flex/ scap 2' ea Flex/ scap 2 min Flex/ scap 2 min Flex/ scap 2 min Flex/scap x 2 min ea  -->  ---->     Wand- flex/abd/ ER 1# 3-5\" x15 ea 1# x15 ea 1# x15 ea      ---> --->     Sidely ER  2x10 2x10 1# 2x10 1# 2x10 1# 2x10 1# 2x10      Sidely abd  2x8 with assist 2x10 min A 1# 2x10 2x10 1# 2x10 1# 2x10      Fwd/ scap/ abd reach    1# x10 0# 2x10 0# 2x10 0# 2x10 ea      Bicep curl    2# 2x10 2# 2x10  2# 2x10 2# 2x10      UBE             Nu-Step (no arms) L5 10 min   L6 10 min L6 10 min np L6 x 10 min  10 min L4  10 min L4  10 min  L4   Neuro Re-Ed                 Scap retractions 20x 20x 20x 20x     20x  20x  20x   Prone shld- ext/ abd/ flex    -->                                                TA setting -       5\"x20 5\" x20 5\"x20   TA with march 20x 20x 20x 20x 20x 20x 20x 2x10 2x10 2x10   TA with bridge 2x10 2x10 2x10 2x10 2x10 2x10 2x10  x10 x10 x10   TA with walk out BKFO 10x ea BKFO 10x ea BKFO 10x ea BKFO 10x ea BKFO 10x BKFO 10x BKFO 10x ea BKFO x10 BKFO x10 BKFO  X10 B   TA with SLR 2x10 B 2x10 ea 2x10 ea 2x10 ea 2x10 2x10 ea 2x10 ea x10 x10 X10 B   Ther-Act              pt ed                                            Modalities                 Prone HP with TENs MHP 10 min post MHP to L/S- ice to shld 10 min P R shoulder 10 min post      " HP to L/S 10 min HP to L/S   X10 min

## 2024-05-21 ENCOUNTER — APPOINTMENT (OUTPATIENT)
Dept: PHYSICAL THERAPY | Facility: CLINIC | Age: 54
End: 2024-05-21
Payer: COMMERCIAL

## 2024-05-23 ENCOUNTER — APPOINTMENT (OUTPATIENT)
Dept: PHYSICAL THERAPY | Facility: CLINIC | Age: 54
End: 2024-05-23
Payer: COMMERCIAL

## 2024-05-28 ENCOUNTER — APPOINTMENT (OUTPATIENT)
Dept: PHYSICAL THERAPY | Facility: CLINIC | Age: 54
End: 2024-05-28
Payer: COMMERCIAL

## 2024-05-30 ENCOUNTER — APPOINTMENT (OUTPATIENT)
Dept: PHYSICAL THERAPY | Facility: CLINIC | Age: 54
End: 2024-05-30
Payer: COMMERCIAL

## 2024-06-04 ENCOUNTER — OFFICE VISIT (OUTPATIENT)
Dept: PHYSICAL THERAPY | Facility: CLINIC | Age: 54
End: 2024-06-04
Payer: COMMERCIAL

## 2024-06-04 DIAGNOSIS — M54.16 LUMBAR RADICULOPATHY: ICD-10-CM

## 2024-06-04 DIAGNOSIS — M25.511 ACUTE PAIN OF RIGHT SHOULDER: Primary | ICD-10-CM

## 2024-06-04 DIAGNOSIS — Z98.890 S/P RIGHT ROTATOR CUFF REPAIR: ICD-10-CM

## 2024-06-04 PROCEDURE — 97112 NEUROMUSCULAR REEDUCATION: CPT

## 2024-06-04 PROCEDURE — 97110 THERAPEUTIC EXERCISES: CPT

## 2024-06-04 PROCEDURE — 97140 MANUAL THERAPY 1/> REGIONS: CPT

## 2024-06-04 NOTE — PROGRESS NOTES
"Daily Note     Today's date: 2024  Patient name: Christopher Smalls Jr.  : 1970  MRN: [id number]  Referring provider: Uziel Michael PA*  Dx:   Encounter Diagnosis     ICD-10-CM    1. Acute pain of right shoulder  M25.511       2. S/P right rotator cuff repair  Z98.890       3. Lumbar radiculopathy - Right  M54.16                      Subjective: Patient reports R shoulder has been doing very well since last PT session approx 3 weeks ago. He has continued to perform HEP regularly. He does note his low back has been more sore lately- especially with standing and walking. He says he is unsure why his pain has flared up because he does not recall any changes in activity.      Objective: See treatment diary below      Assessment: Tolerated treatment well. R shoulder PROM assessed to be WNLs for all ranges other than abduction, which still remains functional with only mild limitation. Patient would benefit from continued PT to increase R shoulder strength as well as trunk mobility and core strength for improved function in daily activities.      Plan: Continue per plan of care.      Precautions: Right RCR 3/13/24 - see protocol     Daily Treatment Diary:      Initial Evaluation Date: 24  Compliance      Visit Number 11 12 13 14 15 16 17 18     Re-Eval     Y          Foto Captured   Y                   5/ 5/7  516      Manual              L/S STM             HS stretch/ SKTC 30\"x3 ea 30\" x3 ea       BLE stretching 8 min     R shld PROM 10 min 10 min 10 min 10 min 10 min 10 min 10 min 8 min     Scap mobilization R              Ther-Ex              LTR 5\"x10 5\" x10 5\"x10 5\" x10 5\" x10 5\"x10 5\" 10x  5\"x10     Hip IR stretch              Supine shld flex reach  x10 x10 x10 x10 x10 10x      Sub max RC iso - IR/ER/Ext/add/ abd 5\"X10 ea all dir X20 as tolerated 20x ea Light resistded MRE x20 Light resisted MRE x20 light resisted MRE x20       Pendulums  1 " "min F/B. S/S 1 min ea 1 min ea 1 min ea 20x ea 20x ea      Ball roll on table Flx/scap 1 min ea Flex/ scap 1 min ea Flex/ scap 1 min ea Flex/ scap 1 min  Flex/ scap 1 min ea Flex/ scap 1 min ea Flex/scap x 1 min ea Flex/scap x 1 min ea     Digi  dc -           Wrist/elbow AROM dc -           pulley 2' ea Flex/ scap 2' ea Flex/ scap 2' ea Flex/ scap 2 min Flex/ scap 2 min Flex/ scap 2 min Flex/scap x 2 min ea flex/scap x 2 min ea     Wand- flex/abd/ ER 1# 3-5\" x15 ea 1# x15 ea 1# x15 ea          Sidely ER  2x10 2x10 1# 2x10 1# 2x10 1# 2x10 1# 2x10 1# 2x10     Sidely abd  2x8 with assist 2x10 min A 1# 2x10 2x10 1# 2x10 1# 2x10 1# 2x10     Fwd/ scap/ abd reach    1# x10 0# 2x10 0# 2x10 0# 2x10 ea 2# 2x10 ea     Bicep curl    2# 2x10 2# 2x10  2# 2x10 2# 2x10 2# 2x10     UBE             Nu-Step (no arms) L5 10 min   L6 10 min L6 10 min np L6 x 10 min L6 10 min     Neuro Re-Ed              Rows/low rows 20x 20x 20x 20x    nv     TB ER/IR        nv     Prone shld- ext/ abd/ flex    -->    nv                                            TA setting -            TA with march 20x 20x 20x 20x 20x 20x 20x 20x     TA with bridge 2x10 2x10 2x10 2x10 2x10 2x10 2x10  2x10     TA with walk out BKFO 10x ea BKFO 10x ea BKFO 10x ea BKFO 10x ea BKFO 10x BKFO 10x BKFO 10x ea BKFO 10x ea     TA with SLR 2x10 B 2x10 ea 2x10 ea 2x10 ea 2x10 2x10 ea 2x10 ea 2x10 ea     Ther-Act              pt ed                                         Modalities              Prone HP with TENs MHP 10 min post MHP to L/S- ice to shld 10 min P R shoulder 10 min post                                                                   "

## 2024-06-06 ENCOUNTER — EVALUATION (OUTPATIENT)
Dept: PHYSICAL THERAPY | Facility: CLINIC | Age: 54
End: 2024-06-06
Payer: COMMERCIAL

## 2024-06-06 DIAGNOSIS — M54.16 LUMBAR RADICULOPATHY: ICD-10-CM

## 2024-06-06 DIAGNOSIS — M25.511 ACUTE PAIN OF RIGHT SHOULDER: Primary | ICD-10-CM

## 2024-06-06 DIAGNOSIS — Z98.890 STATUS POST RIGHT ROTATOR CUFF REPAIR: ICD-10-CM

## 2024-06-06 DIAGNOSIS — Z98.890 S/P RIGHT ROTATOR CUFF REPAIR: ICD-10-CM

## 2024-06-06 PROCEDURE — 97112 NEUROMUSCULAR REEDUCATION: CPT | Performed by: PHYSICAL THERAPIST

## 2024-06-06 PROCEDURE — 97140 MANUAL THERAPY 1/> REGIONS: CPT | Performed by: PHYSICAL THERAPIST

## 2024-06-06 PROCEDURE — 97110 THERAPEUTIC EXERCISES: CPT | Performed by: PHYSICAL THERAPIST

## 2024-06-06 NOTE — PROGRESS NOTES
PT Re-Evaluation     Today's date: 2024  Patient name: Christopher Smalls Jr.  : 1970  MRN: [id number]  Referring provider: Uziel Michael PA*  Dx:   Encounter Diagnosis     ICD-10-CM    1. Acute pain of right shoulder  M25.511       2. S/P right rotator cuff repair  Z98.890       3. Lumbar radiculopathy - Right  M54.16       4. Status post right rotator cuff repair  Z98.890                        Assessment  Impairments: abnormal or restricted ROM, activity intolerance, impaired physical strength, lacks appropriate home exercise program and pain with function  Symptom irritability: high    Assessment details: Patient is a 54 y.o. male who presents to PT with a history of LBP with RLE radicular symptoms and right rotator cuff tear. He demonstrates a painful and limited AROM of the L/S. Patient demonstrates atrophy of the right quad and muscular hypertonicity of the L/S erector spinae. The RLE is weaker than the LLE. Patient is antalgic with transfers from sit to stand and sit to supine. Patient demonstrates decreased AROM and PROM of the right shoulder as well as poor strength. He was instruction with pendulum exercise as well as gentle L/S AROM for his HEP. He is expected to respond well to PT intervention.    UPDATE 24  Patient is progressing nicely toward all goals with increasing shoulder AROM/ PROM and decreasing pain complaints. Patient is responding well to PT intervention overall. L/S pain symptoms are intermittent but improved overall. He is able to complete core stabilization exercise appropriately at this time.      UPDATE 24  Patient is recovering well from recent RCR and feels he is ready to return to work duties. He is able to demonstrate AROM WFL and has progressed with strength program as expected. He did have a 3 week hiatus from PT due to urology surgery. Patient continues to work to improve L/S pain symptoms however pain and radicular symptoms do persist. He will call   Erasto's office to seek guidance for further intervention for the L/S pain.     Understanding of Dx/Px/POC: excellent     Prognosis: excellent    Goals  STG - 4 weeks  Patient able to demonstrate increased L/S AROM by 25 %.  Patient to report pain at worst 5/10 in the L/S with daily activity.  Patient to be independent with HEP.  Patient able to complete self dressing tasks of the upper body without assistance following R shld surgery.  Patient able to complete hygiene tasks without assistance.  Patient to have increased right shoulder PROM to 90degrees into flexion.    LTG - 16 weeks  Patient able to demonstrate painfree AROM fo the L/S.  Patient able to demonstrate good posture in sitting and standing.  Patient able to demonstrate proper body mechanics for lifting and carrying items.  Patient to demonstrate core strength at  4+/5 .  Patient able to demonstrate strength of the right shoulder into flexion/ abduction to 4+/5.  Patient to demonstrate increased strength of the RIGHT shoulder into ER to 4+/5.  Patient able to demonstrate RIGHT shoulder AROM to WNL for completion of functional tasks.  Patient to note pain at worst with activity to be 1/10 SPR.  Patient to demonstrate posture in sitting and standing WNL.  Patient to be d/c to final/ revised HEP.     Plan  Patient would benefit from: PT eval and skilled physical therapy  Planned modality interventions: TENS, thermotherapy: hydrocollator packs, cryotherapy and electrical stimulation/Russian stimulation    Planned therapy interventions: manual therapy, neuromuscular re-education, therapeutic activities, therapeutic exercise and home exercise program    Frequency: 2x week  Duration in weeks: 6  Plan of Care beginning date: 6/6/2024  Plan of Care expiration date: 7/18/2024  Treatment plan discussed with: patient  Plan details: Patient's evaluation is completed. Patient was instructed with a HEP this date. Patient has scheduled further PT sessions for  treatment.      Subjective Evaluation    History of Present Illness  Mechanism of injury: Patient notes that he has had pain in the back for about 6 months. He notes that he does have radicular RLE - tingling to the foot constant. He notes that sitting is somewhat worse than standing. Knees are painful at times. He notes that sleeping is challenging due to pain in the back. He is a  - so moves trailer around. He typically work 8-10 hours. Switching from Gabapentin to Lyrica.     Will undergo a R RCR on 3/14/24 with Dr. Blue. He notes he has had pain for over a year. As per- MRI - Complete insertional tear of the supraspinatus, with torn tendon edge retracted to the level of the humeral head apex. Patient is unsure how he Injured the shoulder but attributes it to wear and tear.     UPDATE 24  Patient notes he does have difficulty with reaching OH with the right shoulder. He is able to dress independently but does have trouble taking off a jacket at times. He notes LBP with walking along the waist line. He notes sleep disturbances due to the shoulder does persist intermittently.     UPDATE 24  Patient notes his shoulder is doing well. He has persistent pain in the RLE and L/S region - he plans to contact Dr. Muarer for possible pain intervention strategies.   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)  Treatments  Previous treatment: physical therapy      Objective     Active Range of Motion     Right  Shoulder   Flexion: 165 degrees with pain  Extension: 60 degrees with pain  Abduction: 160 degrees with pain  External rotation 0°: WFL  Internal rotation 0°: WFL    Lumbar   Flexion:  with pain Restriction level: minimal  Extension:  with pain Restriction level: moderate  Left lateral flexion:  with pain Restriction level: minimal  Right lateral flexion:  with pain Restriction level: minimal  Left rotation:  Restriction level: minimal  Right rotation:  with pain Restriction level: minimal    Additional Active Range of Motion Details  Tenderness diffusely throughout the lumbar region with palpation to musculature. No point specific pain with palpation.     UPDATE 6/6/24 - Patient is able to complete left rotation and lateral flexion with pain complaints - movement to the right and into flexion result in increased R LS pain and RLE radic to the mid lateral thigh.     Passive Range of Motion     Right Shoulder   Flexion: 145 degrees   Extension: 60 degrees   Abduction: 120 degrees   External rotation 0°: 60 degrees     Strength/Myotome Testing     Right Shoulder     Planes of Motion   Flexion: 3   Extension: 4   Abduction: 2+   Adduction: 3+   External rotation at 0°: 3-   Internal rotation at 0°: 3+     Left Hip   Planes of Motion   Flexion: 4  Extension: 4  Abduction: 4  Adduction: 4  External rotation: 4  Internal rotation: 4    Right Hip   Planes of Motion   Flexion: 4-  Extension: 4-  Abduction: 4-  Adduction: 4  External rotation: 4  Internal rotation: 4    Left Knee   Flexion: 4+  Extension: 4+    Right Knee   Flexion: 4  Extension: 4    Left Ankle/Foot   Dorsiflexion: 4+  Plantar flexion: 4+    Right Ankle/Foot   Dorsiflexion: 4  Plantar flexion: 4             Precautions: Right RCR 3/13/24 - see protocol     Daily Treatment Diary:      Initial Evaluation Date: 03/04/24  Compliance 4/25 4/30 5/2 5/7 5/9 5/14 5/16 6/4 6/6    Visit Number 11 12 13 14 15 16 17 18 19    Re-Eval     Y      Y    Foto Ronan   Y     "              4/25 4/30 5/2 5/7 5/9 5/14 516 6/4 6/6    Manual              L/S STM             HS stretch/ SKTC 30\"x3 ea 30\" x3 ea       BLE stretching 8 min -->    R shld PROM 10 min 10 min 10 min 10 min 10 min 10 min 10 min 8 min 8 min    Scap mobilization R              Ther-Ex              LTR 5\"x10 5\" x10 5\"x10 5\" x10 5\" x10 5\"x10 5\" 10x  5\"x10 5\" x10    Hip IR stretch              Supine shld flex reach  x10 x10 x10 x10 x10 10x      Sub max RC iso - IR/ER/Ext/add/ abd 5\"X10 ea all dir X20 as tolerated 20x ea Light resistded MRE x20 Light resisted MRE x20 light resisted MRE x20       Pendulums  1 min F/B. S/S 1 min ea 1 min ea 1 min ea 20x ea 20x ea      Ball roll on table Flx/scap 1 min ea Flex/ scap 1 min ea Flex/ scap 1 min ea Flex/ scap 1 min  Flex/ scap 1 min ea Flex/ scap 1 min ea Flex/scap x 1 min ea Flex/scap x 1 min ea Ball roll up wall x10    Digi  dc -       Weighted ball- CW/CCW on wall x10    Wrist/elbow AROM dc -       TB- IR/ER/ D1/D2 green 2x10 ea    pulley 2' ea Flex/ scap 2' ea Flex/ scap 2' ea Flex/ scap 2 min Flex/ scap 2 min Flex/ scap 2 min Flex/scap x 2 min ea flex/scap x 2 min ea D/c    Wand- flex/abd/ ER 1# 3-5\" x15 ea 1# x15 ea 1# x15 ea          Sidely ER  2x10 2x10 1# 2x10 1# 2x10 1# 2x10 1# 2x10 1# 2x10 2# 2x10    Sidely abd  2x8 with assist 2x10 min A 1# 2x10 2x10 1# 2x10 1# 2x10 1# 2x10 2# 2x10    Fwd/ scap/ abd reach    1# x10 0# 2x10 0# 2x10 0# 2x10 ea 2# 2x10 ea 2# 2x10 ea    Bicep curl    2# 2x10 2# 2x10  2# 2x10 2# 2x10 2# 2x10 3# 2x10    UBE         2F/2R    Nu-Step (no arms) L5 10 min   L6 10 min L6 10 min np L6 x 10 min L6 10 min -    Neuro Re-Ed              Rows/low rows 20x 20x 20x 20x    nv     TB ER/IR        nv     Prone shld- ext/ abd/ flex    -->    nv                                            TA setting -            TA with march 20x 20x 20x 20x 20x 20x 20x 20x 20x     TA with bridge 2x10 2x10 2x10 2x10 2x10 2x10 2x10  2x10 2x10    TA with walk out BKFO 10x " ea BKFO 10x ea BKFO 10x ea BKFO 10x ea BKFO 10x BKFO 10x BKFO 10x ea BKFO 10x ea BKFO 10x ea    TA with SLR 2x10 B 2x10 ea 2x10 ea 2x10 ea 2x10 2x10 ea 2x10 ea 2x10 ea 2x10 ea    Ther-Act              pt ed                                         Modalities              Prone HP with TENs MHP 10 min post MHP to L/S- ice to shld 10 min P R shoulder 10 min post

## 2024-06-07 ENCOUNTER — OFFICE VISIT (OUTPATIENT)
Dept: OBGYN CLINIC | Facility: CLINIC | Age: 54
End: 2024-06-07
Payer: COMMERCIAL

## 2024-06-07 VITALS
SYSTOLIC BLOOD PRESSURE: 99 MMHG | HEIGHT: 67 IN | DIASTOLIC BLOOD PRESSURE: 66 MMHG | BODY MASS INDEX: 34.69 KG/M2 | HEART RATE: 83 BPM | WEIGHT: 221 LBS

## 2024-06-07 DIAGNOSIS — Z98.890 STATUS POST RIGHT ROTATOR CUFF REPAIR: Primary | ICD-10-CM

## 2024-06-07 PROCEDURE — 99213 OFFICE O/P EST LOW 20 MIN: CPT | Performed by: ORTHOPAEDIC SURGERY

## 2024-06-07 RX ORDER — AMITRIPTYLINE HYDROCHLORIDE 25 MG/1
50 TABLET, FILM COATED ORAL
COMMUNITY
Start: 2024-04-29

## 2024-06-07 NOTE — LETTER
June 7, 2024     Patient: Christopher Smalls Jr.  YOB: 1970  Date of Visit: 6/7/2024      To Whom it May Concern:    Christopher Smalls is under my professional care. Christopher was seen in my office on 6/7/2024. Christopher may return to work on on 6/7/24 with no restrictions .    If you have any questions or concerns, please don't hesitate to call.         Sincerely,          Randy Blue MD        CC: No Recipients

## 2024-06-07 NOTE — PROGRESS NOTES
Subjective   CHIEF COMPLAINT/REASON FOR VISIT  Christopher Smalls Jr. is a 54 y.o. male who presents for  3 month post op follow-up after REPAIR ROTATOR CUFF  ARTHROSCOPIC, subacromial decompression - Right on 3/13/2024     HISTORY OF PRESENT ILLNESS  Overall, he feels things are going well and progressing appropriately.  Patient has been following with physical therapy which is gone well.  He feels like he has made great strides in his motion.  He is ready to go back to work full-time.  He works as a .  He said he does not lift heavy objects.    Objective   PHYSICAL EXAMINATION  General:   Well-appearing  No acute distress  Appears stated age    right Shoulder  Shoulder abduction 160 / 160  Shoulder forward flexion 160 / 160  Shoulder internal rotation T10 / T10  Supraspinatus/ABD 4/5   Infraspinatus/ER 4/5   Negative Belly Press/SS  Negative Neer  Negative Campos  Negative O'briens  Skin is intact with no erythema, warmth or drainage  Motor strength intact distally  Sensation to light touch is normal in the axillary, radial, ulnar, and median nerve distributions.  Fingers warm and perfused    Assessment & Plan   1. Status Post REPAIR ROTATOR CUFF  ARTHROSCOPIC, subacromial decompression - Right    Patient appears to be doing very well and had a schedule.  We encouraged him to continue to work with physical therapy and do exercises at home to strengthen up the shoulder girdle.  We did create the note today saying he can go back to work with no restrictions for .  We did recommend that he be careful with heavy lifting and let us know if he needs restrictions.    We will plan to see him back at the 6 month post-operative sherman. If any issues, questions, or concerns arise between now and the next appointment, we have encouraged the patient contact our team.    Scribe Attestation      I,:  Uziel Michael PA-C am acting as a scribe while in the presence of the attending physician.:       I,:   Randy Blue MD personally performed the services described in this documentation    as scribed in my presence.:

## 2024-06-11 ENCOUNTER — OFFICE VISIT (OUTPATIENT)
Dept: PHYSICAL THERAPY | Facility: CLINIC | Age: 54
End: 2024-06-11
Payer: COMMERCIAL

## 2024-06-11 DIAGNOSIS — M25.511 ACUTE PAIN OF RIGHT SHOULDER: Primary | ICD-10-CM

## 2024-06-11 DIAGNOSIS — Z98.890 STATUS POST RIGHT ROTATOR CUFF REPAIR: ICD-10-CM

## 2024-06-11 DIAGNOSIS — M54.16 LUMBAR RADICULOPATHY: ICD-10-CM

## 2024-06-11 DIAGNOSIS — Z98.890 S/P RIGHT ROTATOR CUFF REPAIR: ICD-10-CM

## 2024-06-11 PROCEDURE — 97110 THERAPEUTIC EXERCISES: CPT

## 2024-06-11 PROCEDURE — 97112 NEUROMUSCULAR REEDUCATION: CPT

## 2024-06-11 NOTE — PROGRESS NOTES
"Daily Note     Today's date: 2024  Patient name: Christopher Smalls Jr.  : 1970  MRN: [id number]  Referring provider: Uziel Michael PA*  Dx:   Encounter Diagnosis     ICD-10-CM    1. Acute pain of right shoulder  M25.511       2. S/P right rotator cuff repair  Z98.890       3. Lumbar radiculopathy - Right  M54.16       4. Status post right rotator cuff repair  Z98.890                      Subjective: Patient reports his low back is not too bad today. Reports he had a f/u for the R shoulder and PA was very pleased with his progress.      Objective: See treatment diary below      Assessment: Tolerated treatment well without complaint. He was moderately challenged by lateral raise additions today. Patient would benefit from continued PT to increase R shoulder and core strength and trunk mobility for improved function in ADLs.      Plan: Continue per plan of care.      Precautions: Right RCR 3/13/24 - see protocol     Daily Treatment Diary:      Initial Evaluation Date: 24  Compliance    Visit Number 11 12 13 14 15 16 17 18 19 20   Re-Eval     Y      Y    Foto Captured   Y                   516    Manual              L/S STM             HS stretch/ SKTC 30\"x3 ea 30\" x3 ea       BLE stretching 8 min -->    R shld PROM 10 min 10 min 10 min 10 min 10 min 10 min 10 min 8 min 8 min    Scap mobilization R              Ther-Ex              LTR 5\"x10 5\" x10 5\"x10 5\" x10 5\" x10 5\"x10 5\" 10x  5\"x10 5\" x10 5\"x10   Hip IR stretch              Supine shld flex reach  x10 x10 x10 x10 x10 10x      Sub max RC iso - IR/ER/Ext/add/ abd 5\"X10 ea all dir X20 as tolerated 20x ea Light resistded MRE x20 Light resisted MRE x20 light resisted MRE x20       Pendulums  1 min F/B. S/S 1 min ea 1 min ea 1 min ea 20x ea 20x ea      Ball roll on table Flx/scap 1 min ea Flex/ scap 1 min ea Flex/ scap 1 min ea Flex/ scap 1 min  Flex/ scap 1 min ea " "Flex/ scap 1 min ea Flex/scap x 1 min ea Flex/scap x 1 min ea Ball roll up wall x10 Ball roll up wall x10   Digi  dc -       Weighted ball- CW/CCW on wall x10 Weighted ball- CW/CCW on wall 2x10   Wrist/elbow AROM dc -       TB- IR/ER/ D1/D2 green 2x10 ea B- IR/ER/ D1/D2 green 2x10 ea   pulley 2' ea Flex/ scap 2' ea Flex/ scap 2' ea Flex/ scap 2 min Flex/ scap 2 min Flex/ scap 2 min Flex/scap x 2 min ea flex/scap x 2 min ea D/c Fwd/lat raises 2# 2x10   Wand- flex/abd/ ER 1# 3-5\" x15 ea 1# x15 ea 1# x15 ea       Chest press 2# 2x10   Sidely ER  2x10 2x10 1# 2x10 1# 2x10 1# 2x10 1# 2x10 1# 2x10 2# 2x10 3# 2x10   Sidely abd  2x8 with assist 2x10 min A 1# 2x10 2x10 1# 2x10 1# 2x10 1# 2x10 2# 2x10 3# 2x10   Fwd/ scap/ abd reach    1# x10 0# 2x10 0# 2x10 0# 2x10 ea 2# 2x10 ea 2# 2x10 ea 2# 2x10 ea   Bicep curl    2# 2x10 2# 2x10  2# 2x10 2# 2x10 2# 2x10 3# 2x10 4# 2x10   UBE         2F/2R 2'/2'   Nu-Step (no arms) L5 10 min   L6 10 min L6 10 min np L6 x 10 min L6 10 min - L6 5 min   Neuro Re-Ed              TA march          2x10   TA SLR          2x10   Rows/low rows 20x 20x 20x 20x    nv     TB ER/IR        nv     Prone shld- ext/ abd/ flex    -->    nv                                                                         TA setting  -                    TA with march  20x  20x  20x  20x  20x  20x  20x  20x  20x     TA with bridge  2x10  2x10  2x10  2x10  2x10  2x10  2x10   2x10  2x10    TA with walk out  BKFO 10x ea  BKFO 10x ea  BKFO 10x ea  BKFO 10x ea  BKFO 10x  BKFO 10x  BKFO 10x ea  BKFO 10x ea  BKFO 10x ea    TA with SLR  2x10 B  2x10 ea  2x10 ea  2x10 ea  2x10  2x10 ea  2x10 ea  2x10 ea  2x10 ea    Ther-Act                       pt ed                                                                    Modalities                       Prone HP with TENs  MHP 10 min post  MHP to L/S- ice to shld 10 min  P R shoulder 10 min post                                                                                     "

## 2024-06-12 ENCOUNTER — TELEPHONE (OUTPATIENT)
Age: 54
End: 2024-06-12

## 2024-06-12 ENCOUNTER — TELEPHONE (OUTPATIENT)
Dept: FAMILY MEDICINE CLINIC | Facility: CLINIC | Age: 54
End: 2024-06-12

## 2024-06-12 NOTE — TELEPHONE ENCOUNTER
Spoke to patient to let him know that doctor has his paperwork but has not had the chance to fill it out yet. He asked when it's completed can we fax it to [employer] @ [phone]

## 2024-06-12 NOTE — TELEPHONE ENCOUNTER
Yesterday 6/12 pt dropped off ppwk for [facility] to be completed by dr dunlap. Pt has not received a c/b for completed ppwk. I advised pt that I would reach out to see if ppwk could be completed today for him to     Please advise pt [phone]

## 2024-06-13 ENCOUNTER — OFFICE VISIT (OUTPATIENT)
Dept: PHYSICAL THERAPY | Facility: CLINIC | Age: 54
End: 2024-06-13
Payer: COMMERCIAL

## 2024-06-13 DIAGNOSIS — Z98.890 STATUS POST RIGHT ROTATOR CUFF REPAIR: ICD-10-CM

## 2024-06-13 DIAGNOSIS — Z98.890 S/P RIGHT ROTATOR CUFF REPAIR: ICD-10-CM

## 2024-06-13 DIAGNOSIS — M25.511 ACUTE PAIN OF RIGHT SHOULDER: Primary | ICD-10-CM

## 2024-06-13 DIAGNOSIS — M54.16 LUMBAR RADICULOPATHY: ICD-10-CM

## 2024-06-13 PROCEDURE — 97112 NEUROMUSCULAR REEDUCATION: CPT

## 2024-06-13 PROCEDURE — 97110 THERAPEUTIC EXERCISES: CPT

## 2024-06-13 NOTE — PROGRESS NOTES
"Daily Note     Today's date: 2024  Patient name: Christopher Smalls Jr.  : 1970  MRN: [id number]  Referring provider: Uziel Michael PA*  Dx:   Encounter Diagnosis     ICD-10-CM    1. Acute pain of right shoulder  M25.511       2. S/P right rotator cuff repair  Z98.890       3. Lumbar radiculopathy - Right  M54.16       4. Status post right rotator cuff repair  Z98.890                      Subjective: Patient reports R shoulder and low back are doing well today.      Objective: See treatment diary below      Assessment: Tolerated treatment well without complaint. Patient would benefit from continued PT to increase R shoulder and core strength for improved function in ADLs.      Plan: Continue per plan of care.      Precautions: Right RCR 3/13/24 - see protocol     Daily Treatment Diary:      Initial Evaluation Date: 24  Compliance    Visit Number 21    15 16 17 18 19 20   Re-Eval           Y    Foto Captured                      516    Manual              L/S STM             HS stretch/ SKTC         BLE stretching 8 min -->    R shld PROM     10 min 10 min 10 min 8 min 8 min    Scap mobilization R              Ther-Ex              LTR 5\"x10    5\" x10 5\"x10 5\" 10x  5\"x10 5\" x10 5\"x10   Hip IR stretch              Supine shld flex reach     x10 x10 10x      Sub max RC iso - IR/ER/Ext/add/ abd     Light resisted MRE x20 light resisted MRE x20       Pendulums     1 min ea 20x ea 20x ea      Ball roll on table Ball roll up wall x10    Flex/ scap 1 min ea Flex/ scap 1 min ea Flex/scap x 1 min ea Flex/scap x 1 min ea Ball roll up wall x10 Ball roll up wall x10   Digi  Weighted ball- CW/CCW on wall 2x10        Weighted ball- CW/CCW on wall x10 Weighted ball- CW/CCW on wall 2x10   Wrist/elbow AROM TB- IR/ER/ D1/D2 green 2x10 ea        TB- IR/ER/ D1/D2 green 2x10 ea B- IR/ER/ D1/D2 green 2x10 ea   pulley Fwd/lat raises 2# 2x10    Flex/ scap " 2 min Flex/ scap 2 min Flex/scap x 2 min ea flex/scap x 2 min ea D/c Fwd/lat raises 2# 2x10   Wand- flex/abd/ ER Chest press 3# 2x10         Chest press 2# 2x10   Sidely ER 3# 2x10    1# 2x10 1# 2x10 1# 2x10 1# 2x10 2# 2x10 3# 2x10   Sidely abd 3# 2x10    2x10 1# 2x10 1# 2x10 1# 2x10 2# 2x10 3# 2x10   Fwd/ scap/ abd reach 2# 2x10    0# 2x10 0# 2x10 0# 2x10 ea 2# 2x10 ea 2# 2x10 ea 2# 2x10 ea   Bicep curl 4# 2x10    2# 2x10  2# 2x10 2# 2x10 2# 2x10 3# 2x10 4# 2x10   UBE 2'/2'        2F/2R 2'/2'   Nu-Step (no arms) L7 6 min    L6 10 min np L6 x 10 min L6 10 min - L6 5 min   Neuro Re-Ed              TA march 2x10         2x10   TA add/abd 2x10 ea         2x10   bridges 2x10            Rows/low rows        nv     TB ER/IR        nv     Prone shld- ext/ abd/ flex        nv                                                                         TA setting  -                    TA with march  20x  20x  20x  20x  20x  20x  20x  20x  20x     TA with bridge  2x10  2x10  2x10  2x10  2x10  2x10  2x10   2x10  2x10    TA with walk out  BKFO 10x ea  BKFO 10x ea  BKFO 10x ea  BKFO 10x ea  BKFO 10x  BKFO 10x  BKFO 10x ea  BKFO 10x ea  BKFO 10x ea    TA with SLR  2x10 B  2x10 ea  2x10 ea  2x10 ea  2x10  2x10 ea  2x10 ea  2x10 ea  2x10 ea    Ther-Act                       pt ed                                                                    Modalities                       Prone HP with TENs  MHP 10 min post  MHP to L/S- ice to shld 10 min  P R shoulder 10 min post

## 2024-06-17 ENCOUNTER — TELEPHONE (OUTPATIENT)
Age: 54
End: 2024-06-17

## 2024-06-17 NOTE — TELEPHONE ENCOUNTER
Patient called regarding paperwork.    He said he dropped off a form to the [place] office last week, to be completed by cardiologist regarding return to work.    Patient asked if the form has been filled out and faxed as he is anxious to get back to work/

## 2024-06-18 ENCOUNTER — OFFICE VISIT (OUTPATIENT)
Dept: PHYSICAL THERAPY | Facility: CLINIC | Age: 54
End: 2024-06-18
Payer: COMMERCIAL

## 2024-06-18 DIAGNOSIS — M25.511 ACUTE PAIN OF RIGHT SHOULDER: Primary | ICD-10-CM

## 2024-06-18 DIAGNOSIS — Z98.890 STATUS POST RIGHT ROTATOR CUFF REPAIR: ICD-10-CM

## 2024-06-18 DIAGNOSIS — Z98.890 S/P RIGHT ROTATOR CUFF REPAIR: ICD-10-CM

## 2024-06-18 DIAGNOSIS — M54.16 LUMBAR RADICULOPATHY: ICD-10-CM

## 2024-06-18 PROCEDURE — 97110 THERAPEUTIC EXERCISES: CPT

## 2024-06-18 PROCEDURE — 97112 NEUROMUSCULAR REEDUCATION: CPT

## 2024-06-18 NOTE — PROGRESS NOTES
"Daily Note     Today's date: 2024  Patient name: Christopher Smalls Jr.  : 1970  MRN: [id number]  Referring provider: Uziel Michael PA*  Dx:   Encounter Diagnosis     ICD-10-CM    1. Acute pain of right shoulder  M25.511       2. S/P right rotator cuff repair  Z98.890       3. Lumbar radiculopathy - Right  M54.16       4. Status post right rotator cuff repair  Z98.890                      Subjective: Patient offers no complaints at start of session. He notes waiting on cardiologist for clearance to RTW.       Objective: See treatment diary below      Assessment: Tolerated treatment well without complaint. Fwd and lateral raises as well as punches continue to be most challenging at this time. Patient would benefit from continued PT to increase R shoulder and core strength for improved function in daily activities.      Plan: Continue per plan of care.      Precautions: Right RCR 3/13/24 - see protocol     Daily Treatment Diary:      Initial Evaluation Date: 24  Compliance    Visit Number 21 22   15 16 17 18 19 20   Re-Eval           Y    Foto Captured                      516    Manual              L/S STM             HS stretch/ SKTC         BLE stretching 8 min -->    R shld PROM     10 min 10 min 10 min 8 min 8 min    Scap mobilization R              Ther-Ex              LTR 5\"x10 5\"x10   5\" x10 5\"x10 5\" 10x  5\"x10 5\" x10 5\"x10   Ball roll on table Ball roll up wall x10 Ball roll up wall x10   Flex/ scap 1 min ea Flex/ scap 1 min ea Flex/scap x 1 min ea Flex/scap x 1 min ea Ball roll up wall x10 Ball roll up wall x10   Digi  Weighted ball- CW/CCW on wall 2x10 Weighted ball- CW/CCW on wall 2x10       Weighted ball- CW/CCW on wall x10 Weighted ball- CW/CCW on wall 2x10   Wrist/elbow AROM TB- IR/ER/ D1/D2 green 2x10 ea TB- IR/ER/ D1/D2 green 2x10 ea       TB- IR/ER/ D1/D2 green 2x10 ea B- IR/ER/ D1/D2 green 2x10 suzanne frederick" Fwd/lat raises 2# 2x10 fwd/lat raises 2# 2x10   Flex/ scap 2 min Flex/ scap 2 min Flex/scap x 2 min ea flex/scap x 2 min ea D/c Fwd/lat raises 2# 2x10   Wand- flex/abd/ ER Chest press 3# 2x10 Chest press 3# 2x10        Chest press 2# 2x10   Sidely ER 3# 2x10 3# 2x10   1# 2x10 1# 2x10 1# 2x10 1# 2x10 2# 2x10 3# 2x10   Sidely abd 3# 2x10 3# 2x10   2x10 1# 2x10 1# 2x10 1# 2x10 2# 2x10 3# 2x10   Fwd/ scap/ abd reach 2# 2x10 3# 2x10   0# 2x10 0# 2x10 0# 2x10 ea 2# 2x10 ea 2# 2x10 ea 2# 2x10 ea   Bicep curl 4# 2x10 4# 2x10   2# 2x10  2# 2x10 2# 2x10 2# 2x10 3# 2x10 4# 2x10   UBE 2'/2' 2'/2'       2F/2R 2'/2'   Nu-Step (no arms) L7 6 min L7 6 min   L6 10 min np L6 x 10 min L6 10 min - L6 5 min   Neuro Re-Ed              TA march 2x10 2x10        2x10   TA add/abd 2x10 ea 2x10        2x10   bridges 2x10 2x10           Rows/low rows        nv     TB ER/IR        nv     Prone shld- ext/ abd/ flex        nv

## 2024-06-19 ENCOUNTER — TELEPHONE (OUTPATIENT)
Dept: FAMILY MEDICINE CLINIC | Facility: CLINIC | Age: 54
End: 2024-06-19

## 2024-06-19 NOTE — TELEPHONE ENCOUNTER
Spoke with Christopher and scheduled a nurse visit for his Diabetic eye exam for 06/20/2024 @9:15am

## 2024-06-20 ENCOUNTER — OFFICE VISIT (OUTPATIENT)
Dept: PHYSICAL THERAPY | Facility: CLINIC | Age: 54
End: 2024-06-20
Payer: COMMERCIAL

## 2024-06-20 ENCOUNTER — CLINICAL SUPPORT (OUTPATIENT)
Dept: FAMILY MEDICINE CLINIC | Facility: CLINIC | Age: 54
End: 2024-06-20
Payer: COMMERCIAL

## 2024-06-20 DIAGNOSIS — E11.9 TYPE 2 DIABETES MELLITUS WITHOUT COMPLICATION, WITHOUT LONG-TERM CURRENT USE OF INSULIN (HCC): Primary | ICD-10-CM

## 2024-06-20 DIAGNOSIS — Z98.890 STATUS POST RIGHT ROTATOR CUFF REPAIR: ICD-10-CM

## 2024-06-20 DIAGNOSIS — M54.16 LUMBAR RADICULOPATHY: ICD-10-CM

## 2024-06-20 DIAGNOSIS — M25.511 ACUTE PAIN OF RIGHT SHOULDER: Primary | ICD-10-CM

## 2024-06-20 DIAGNOSIS — Z98.890 S/P RIGHT ROTATOR CUFF REPAIR: ICD-10-CM

## 2024-06-20 LAB
LEFT EYE DIABETIC RETINOPATHY: ABNORMAL
LEFT EYE IMAGE QUALITY: ABNORMAL
LEFT EYE MACULAR EDEMA: ABNORMAL
LEFT EYE OTHER RETINOPATHY: ABNORMAL
RIGHT EYE DIABETIC RETINOPATHY: ABNORMAL
RIGHT EYE IMAGE QUALITY: ABNORMAL
RIGHT EYE MACULAR EDEMA: ABNORMAL
RIGHT EYE OTHER RETINOPATHY: ABNORMAL
SEVERITY (EYE EXAM): ABNORMAL

## 2024-06-20 PROCEDURE — 92250 FUNDUS PHOTOGRAPHY W/I&R: CPT

## 2024-06-20 PROCEDURE — 97112 NEUROMUSCULAR REEDUCATION: CPT

## 2024-06-20 PROCEDURE — 97110 THERAPEUTIC EXERCISES: CPT

## 2024-06-20 NOTE — TELEPHONE ENCOUNTER
Form is faxed to [phone]  Also faxed in cardiology [facility]   Patient is aware and apologized to for the Delay, as Doctor had form signed and dated 06/17/2024

## 2024-06-20 NOTE — PROGRESS NOTES
Patient came in for a Diabetic eye exam. Patient was in a dark setting for about 5 minutes and his pupils weren't dilating, tried to take good quality photos of all 4 anatomy of the eye.

## 2024-06-20 NOTE — PROGRESS NOTES
"Daily Note     Today's date: 2024  Patient name: Christopher Smalls Jr.  : 1970  MRN: [id number]  Referring provider: Uziel Michael PA*  Dx:   Encounter Diagnosis     ICD-10-CM    1. Acute pain of right shoulder  M25.511       2. S/P right rotator cuff repair  Z98.890       3. Lumbar radiculopathy - Right  M54.16       4. Status post right rotator cuff repair  Z98.890                      Subjective: Patient reports R shoulder and low back have been doing pretty good overall.      Objective: See treatment diary below      Assessment: Tolerated treatment well. Patient with moderate fatigue following fwd and lateral raises and function reaches. Patient would benefit from continued PT to progress toward goals as well as prepare for soon RTW.      Plan: Continue per plan of care.      Precautions: Right RCR 3/13/24 - see protocol     Daily Treatment Diary:      Initial Evaluation Date: 24  Compliance    Visit Number 21 22 23  15 16 17 18 19 20   Re-Eval           Y    Foto Captured                      516    Manual              L/S STM             HS stretch/ SKTC         BLE stretching 8 min -->    R shld PROM     10 min 10 min 10 min 8 min 8 min    Scap mobilization R              Ther-Ex              LTR 5\"x10 5\"x10 5\"x10  5\" x10 5\"x10 5\" 10x  5\"x10 5\" x10 5\"x10   Ball roll on table Ball roll up wall x10 Ball roll up wall x10 Ball roll up wall x10  Flex/ scap 1 min ea Flex/ scap 1 min ea Flex/scap x 1 min ea Flex/scap x 1 min ea Ball roll up wall x10 Ball roll up wall x10   Digi  Weighted ball- CW/CCW on wall 2x10 Weighted ball- CW/CCW on wall 2x10 Weighted ball- CW/CCW on wall 2x10      Weighted ball- CW/CCW on wall x10 Weighted ball- CW/CCW on wall 2x10   Wrist/elbow AROM TB- IR/ER/ D1/D2 green 2x10 ea TB- IR/ER/ D1/D2 green 2x10 ea TB- IR/ER/D1/D2 black 2x10      TB- IR/ER/ D1/D2 green 2x10 ea B- IR/ER/ D1/D2 " green 2x10 ea   pulley Fwd/lat raises 2# 2x10 fwd/lat raises 2# 2x10 Fwd 3#/lat raises 2# 2x10  Flex/ scap 2 min Flex/ scap 2 min Flex/scap x 2 min ea flex/scap x 2 min ea D/c Fwd/lat raises 2# 2x10   Wand- flex/abd/ ER Chest press 3# 2x10 Chest press 3# 2x10 Chest press 3# 2x10       Chest press 2# 2x10   Sidely ER 3# 2x10 3# 2x10 3# 2x10  1# 2x10 1# 2x10 1# 2x10 1# 2x10 2# 2x10 3# 2x10   Sidely abd 3# 2x10 3# 2x10 3# 2x10  2x10 1# 2x10 1# 2x10 1# 2x10 2# 2x10 3# 2x10   Fwd/ scap/ abd reach 2# 2x10 3# 2x10 3# 2x10  0# 2x10 0# 2x10 0# 2x10 ea 2# 2x10 ea 2# 2x10 ea 2# 2x10 ea   Bicep curl 4# 2x10 4# 2x10 4# 2x10  2# 2x10  2# 2x10 2# 2x10 2# 2x10 3# 2x10 4# 2x10   UBE 2'/2' 2'/2' 2'/2'      2F/2R 2'/2'   Nu-Step (no arms) L7 6 min L7 6 min L7 6 min  L6 10 min np L6 x 10 min L6 10 min - L6 5 min   Neuro Re-Ed              TA march 2x10 2x10 2x10       2x10   TA add/abd 2x10 ea 2x10 2x10       2x10   bridges 2x10 2x10 2x10          Rows/low rows        nv     TB ER/IR        nv     Prone shld- ext/ abd/ flex        nv

## 2024-06-25 ENCOUNTER — OFFICE VISIT (OUTPATIENT)
Dept: PHYSICAL THERAPY | Facility: CLINIC | Age: 54
End: 2024-06-25
Payer: COMMERCIAL

## 2024-06-25 DIAGNOSIS — M25.511 ACUTE PAIN OF RIGHT SHOULDER: Primary | ICD-10-CM

## 2024-06-25 PROCEDURE — 97110 THERAPEUTIC EXERCISES: CPT

## 2024-06-25 PROCEDURE — 97112 NEUROMUSCULAR REEDUCATION: CPT

## 2024-06-25 NOTE — PROGRESS NOTES
"Daily Note     Today's date: 2024  Patient name: Christopher Smalls Jr.  : 1970  MRN: [id number]  Referring provider: Uziel Michael PA*  Dx:   Encounter Diagnosis     ICD-10-CM    1. Acute pain of right shoulder  M25.511                      Subjective: Patient reports feeling good this am. He denies issue or pain in R shoulder or low back.      Objective: See treatment diary below      Assessment: Tolerated treatment well without complaint. Patient would benefit from continued PT to establish appropriate HEP to keep up with once he RTW to maintain function.       Plan: Continue per plan of care.  Potential discharge next visit.     Precautions: Right RCR 3/13/24 - see protocol     Daily Treatment Diary:      Initial Evaluation Date: 24  Compliance    Visit Number 21 22 23 24   17 18 19 20   Re-Eval           Y    Foto Captured                        516    Manual              L/S STM             HS stretch/ SKTC         BLE stretching 8 min -->    R shld PROM       10 min 8 min 8 min    Scap mobilization R              Ther-Ex              LTR 5\"x10 5\"x10 5\"x10 5\"x10   5\" 10x  5\"x10 5\" x10 5\"x10   Ball roll on table Ball roll up wall x10 Ball roll up wall x10 Ball roll up wall x10 Ball roll up wall x10   Flex/scap x 1 min ea Flex/scap x 1 min ea Ball roll up wall x10 Ball roll up wall x10   Digi  Weighted ball- CW/CCW on wall 2x10 Weighted ball- CW/CCW on wall 2x10 Weighted ball- CW/CCW on wall 2x10 Soccer cw/ccw 30x ea// dribbles 20\"x2 ea     Weighted ball- CW/CCW on wall x10 Weighted ball- CW/CCW on wall 2x10   Wrist/elbow AROM TB- IR/ER/ D1/D2 green 2x10 ea TB- IR/ER/ D1/D2 green 2x10 ea TB- IR/ER/D1/D2 black 2x10 TB- IR/ER/D1/D2 black 2x10     TB- IR/ER/ D1/D2 green 2x10 ea B- IR/ER/ D1/D2 green 2x10 ea   pulley Fwd/lat raises 2# 2x10 fwd/lat raises 2# 2x10 Fwd 3#/lat raises 2# 2x10 fwd/lat raises 3# 2x10   Flex/scap " x 2 min ea flex/scap x 2 min ea D/c Fwd/lat raises 2# 2x10   Wand- flex/abd/ ER Chest press 3# 2x10 Chest press 3# 2x10 Chest press 3# 2x10 Chest press 4# 2x10      Chest press 2# 2x10   Sidely ER 3# 2x10 3# 2x10 3# 2x10 3# 2x10   1# 2x10 1# 2x10 2# 2x10 3# 2x10   Sidely abd 3# 2x10 3# 2x10 3# 2x10 3# 2x10   1# 2x10 1# 2x10 2# 2x10 3# 2x10   Fwd/ scap/ abd reach 2# 2x10 3# 2x10 3# 2x10 3# 2x10   0# 2x10 ea 2# 2x10 ea 2# 2x10 ea 2# 2x10 ea   Bicep curl 4# 2x10 4# 2x10 4# 2x10 5# 2x10   2# 2x10 2# 2x10 3# 2x10 4# 2x10   UBE 2'/2' 2'/2' 2'/2' 2'/2'     2F/2R 2'/2'   Nu-Step (no arms) L7 6 min L7 6 min L7 6 min L7 6 min   L6 x 10 min L6 10 min - L6 5 min   Neuro Re-Ed              TA march 2x10 2x10 2x10 2x10      2x10   TA add/abd 2x10 ea 2x10 2x10 2x10      2x10   bridges 2x10 2x10 2x10 2x10         Rows/low rows        nv     TB ER/IR        nv     Prone shld- ext/ abd/ flex        nv

## 2024-09-10 ENCOUNTER — TELEPHONE (OUTPATIENT)
Dept: CARDIOLOGY CLINIC | Facility: CLINIC | Age: 54
End: 2024-09-10

## 2024-09-10 NOTE — TELEPHONE ENCOUNTER
Called pt cell phone [phone], message came on from [employer] number has been disconnected, changed or no longer in service.  Removed number from patient chart.  Called and left  message on wife cell phone number for patient to call back and resched dos 10/18/24.

## 2024-09-14 DIAGNOSIS — M54.16 LUMBAR RADICULOPATHY: ICD-10-CM

## 2024-09-16 RX ORDER — PREGABALIN 75 MG/1
75 CAPSULE ORAL 3 TIMES DAILY
Qty: 90 CAPSULE | Refills: 0 | Status: SHIPPED | OUTPATIENT
Start: 2024-09-16

## 2024-09-27 ENCOUNTER — OFFICE VISIT (OUTPATIENT)
Dept: FAMILY MEDICINE CLINIC | Facility: CLINIC | Age: 54
End: 2024-09-27
Payer: COMMERCIAL

## 2024-09-27 VITALS
HEART RATE: 97 BPM | OXYGEN SATURATION: 98 % | BODY MASS INDEX: 35.18 KG/M2 | SYSTOLIC BLOOD PRESSURE: 138 MMHG | DIASTOLIC BLOOD PRESSURE: 62 MMHG | TEMPERATURE: 97.6 F | WEIGHT: 224.65 LBS

## 2024-09-27 DIAGNOSIS — C61 PROSTATE CA (HCC): ICD-10-CM

## 2024-09-27 DIAGNOSIS — Z23 ENCOUNTER FOR IMMUNIZATION: ICD-10-CM

## 2024-09-27 DIAGNOSIS — M54.16 LUMBAR RADICULOPATHY: ICD-10-CM

## 2024-09-27 DIAGNOSIS — F32.9 REACTIVE DEPRESSION: ICD-10-CM

## 2024-09-27 DIAGNOSIS — E11.9 TYPE 2 DIABETES MELLITUS WITHOUT COMPLICATION, WITHOUT LONG-TERM CURRENT USE OF INSULIN (HCC): ICD-10-CM

## 2024-09-27 DIAGNOSIS — E78.2 MIXED HYPERLIPIDEMIA: ICD-10-CM

## 2024-09-27 DIAGNOSIS — G45.9 TIA (TRANSIENT ISCHEMIC ATTACK): ICD-10-CM

## 2024-09-27 DIAGNOSIS — I10 ESSENTIAL HYPERTENSION: ICD-10-CM

## 2024-09-27 DIAGNOSIS — G43.109 MIGRAINE WITH AURA AND WITHOUT STATUS MIGRAINOSUS, NOT INTRACTABLE: ICD-10-CM

## 2024-09-27 DIAGNOSIS — K21.9 GASTROESOPHAGEAL REFLUX DISEASE, UNSPECIFIED WHETHER ESOPHAGITIS PRESENT: ICD-10-CM

## 2024-09-27 DIAGNOSIS — Z87.891 HISTORY OF TOBACCO USE DISORDER: ICD-10-CM

## 2024-09-27 DIAGNOSIS — M79.672 LEFT FOOT PAIN: ICD-10-CM

## 2024-09-27 DIAGNOSIS — I25.10 CORONARY ARTERY DISEASE INVOLVING NATIVE CORONARY ARTERY OF NATIVE HEART WITHOUT ANGINA PECTORIS: Primary | ICD-10-CM

## 2024-09-27 PROCEDURE — 99214 OFFICE O/P EST MOD 30 MIN: CPT | Performed by: FAMILY MEDICINE

## 2024-09-27 PROCEDURE — 90673 RIV3 VACCINE NO PRESERV IM: CPT | Performed by: FAMILY MEDICINE

## 2024-09-27 PROCEDURE — 90471 IMMUNIZATION ADMIN: CPT | Performed by: FAMILY MEDICINE

## 2024-09-27 PROCEDURE — 90472 IMMUNIZATION ADMIN EACH ADD: CPT | Performed by: FAMILY MEDICINE

## 2024-09-27 PROCEDURE — 90677 PCV20 VACCINE IM: CPT | Performed by: FAMILY MEDICINE

## 2024-09-27 NOTE — ASSESSMENT & PLAN NOTE
Chronic with low back pain. Used to follow with spine and pain mgmt in [place] and needs to re-est here as the injections he was getting were helpful. Previously referred. Continue Lyrica

## 2024-09-27 NOTE — ASSESSMENT & PLAN NOTE
Chronic  Current Blood Pressure: 138/62 -just restarted meds a week ago  Continue Metoprolol, amlodipine 5mg daily  F/u 4 mo

## 2024-09-27 NOTE — ASSESSMENT & PLAN NOTE
Lab Results   Component Value Date    CHOLESTEROL 116 05/01/2024    TRIG 137 05/01/2024    HDL 34 (L) 05/01/2024    LDLCALC 55 05/01/2024     Will continue on atorvastatin 80 mg daily, Zetia 10 mg daily.

## 2024-09-27 NOTE — ASSESSMENT & PLAN NOTE
Echo 3/2024: EF 56%, mild LV wall thickness, mod AR, mild TR, mild MR  Now est with [facility] cardiology  Continue asa, atorvastatin, metoprolol, zetia, amlodipine  Mgmt of new onset T2DM as below   F/u 4 mo    Orders:    CBC and differential; Future    Lipid panel; Future    Hemoglobin A1C; Future

## 2024-09-27 NOTE — PROGRESS NOTES
Ambulatory Visit  Name: Christopher Smalls Jr.      : 1970      MRN: [id number]  Encounter Provider: Edwina Davies DO  Encounter Date: 2024   Encounter department: [facility]    Assessment & Plan  Coronary artery disease involving native coronary artery of native heart without angina pectoris  Echo 3/2024: EF 56%, mild LV wall thickness, mod AR, mild TR, mild MR  Now est with [facility] cardiology  Continue asa, atorvastatin, metoprolol, zetia, amlodipine  Mgmt of new onset T2DM as below   F/u 4 mo    Orders:    CBC and differential; Future    Lipid panel; Future    Hemoglobin A1C; Future    Essential hypertension  Chronic  Current Blood Pressure: 138/62 -just restarted meds a week ago  Continue Metoprolol, amlodipine 5mg daily  F/u 4 mo            Migraine with aura and without status migrainosus, not intractable  Chronic, follows with neurology  Continue Amitriptyline 50mg daily + prn sumatriptan         Gastroesophageal reflux disease, unspecified whether esophagitis present  Chronic, well controlled on Prilosec, continue omeprazole 20 mg daily  Continue to monitor         Lumbar radiculopathy  Chronic with low back pain. Used to follow with spine and pain mgmt in [place] and needs to re-est here as the injections he was getting were helpful. Previously referred. Continue Lyrica         Prostate CA (HCC)  S/p resection  Cont mgmt per urology         Reactive depression  Stable, continue Cymbalta 60mg daily         History of tobacco use disorder  Quit smoking 2023  Prior heavy smoker, 3 packs a day 40 years  Continue to monitor  Continue annual CT lung screenings in April         Mixed hyperlipidemia  Lab Results   Component Value Date    CHOLESTEROL 116 2024    TRIG 137 2024    HDL 34 (L) 2024    LDLCALC 55 2024     Will continue on atorvastatin 80 mg daily, Zetia 10 mg daily.           Type 2 diabetes mellitus without  complication, without long-term current use of insulin (HCC)  Lab Results   Component Value Date    HGBA1C 6.5 (H) 05/01/2024     New onset as of 2024  Continue Metformin ER 500mg daily  On statin  Needs DM eye exam - previously referred  DM foot exam completed and normal  Check labs and follow up 4 mo         TIA (transient ischemic attack)         Encounter for immunization    Orders:    Pneumococcal Conjugate Vaccine 20-valent (Pcv20)    influenza vaccine, recombinant, PF, 0.5 mL IM (Flublok)    Left foot pain  Left foot with lump, feel bony - possible prior injury - causes pain with certain shoes.   Refer to podiatry  Orders:    Ambulatory Referral to Podiatry; Future       Return in about 4 months (around 1/27/2025) for Recheck Chronic Conditions.    History of Present Illness     Chief Complaint   Patient presents with    Follow-up     HPI  Patient presents for follow up of chronic conditions and need for paper work to be completed for his insurance. He reports no acute concerns. During DM foot exam, I noticed a lump on the left foot. He admits this has been there a long time and odes occasionally cause him pain. He cannot recall injury to the left foot but the bump feels bony and is not mobile.     History obtained from : patient  Review of Systems   Constitutional:  Negative for appetite change, chills and fever.   Eyes:  Negative for visual disturbance.   Respiratory:  Negative for shortness of breath.    Cardiovascular:  Negative for chest pain and leg swelling.   Neurological:  Negative for dizziness, light-headedness and headaches.     Current Outpatient Medications on File Prior to Visit   Medication Sig Dispense Refill    acetaminophen (TYLENOL) 325 mg tablet Take 1 tablet (325 mg total) by mouth every 6 (six) hours as needed for mild pain 30 tablet 0    amitriptyline (ELAVIL) 25 mg tablet 50 mg      amLODIPine (NORVASC) 5 mg tablet Take 1 tablet by mouth once daily 90 tablet 1    atorvastatin  (LIPITOR) 80 mg tablet Take 1 tablet by mouth once daily 90 tablet 1    DULoxetine (CYMBALTA) 60 mg delayed release capsule Take 1 capsule by mouth once daily 90 capsule 1    ezetimibe (ZETIA) 10 mg tablet Take 1 tablet by mouth once daily 90 tablet 1    metFORMIN (GLUCOPHAGE-XR) 500 mg 24 hr tablet Take 1 tablet (500 mg total) by mouth daily with breakfast 90 tablet 0    metoprolol succinate (TOPROL-XL) 25 mg 24 hr tablet Take 1 tablet by mouth once daily 90 tablet 1    omeprazole (PriLOSEC) 20 mg delayed release capsule Take 1 capsule (20 mg total) by mouth daily 90 capsule 1    pregabalin (LYRICA) 75 mg capsule TAKE 1 CAPSULE BY MOUTH THREE TIMES DAILY 90 capsule 0    SUMAtriptan (IMITREX) 100 mg tablet Take 1 tablet (100 mg total) by mouth as needed for migraine May repeat in 2 hours. Limit of 3/week or 9/month 16 tablet 3    [DISCONTINUED] amitriptyline (ELAVIL) 25 mg tablet Take 1 tablet (25mg) by mouth at bedtime for 1 week.  May increase to 2 tablets (50mg) at bedtime thereafter if tolerating well. 60 tablet 3    [DISCONTINUED] naproxen (Naprosyn) 500 mg tablet Take 1 tablet (500 mg total) by mouth 2 (two) times a day with meals 60 tablet 0    aspirin 81 mg chewable tablet Chew 1 tablet (81 mg total) daily 30 tablet 3     No current facility-administered medications on file prior to visit.      Social History     Tobacco Use    Smoking status: Former     Current packs/day: 0.00     Average packs/day: 3.0 packs/day for 49.0 years (147.0 ttl pk-yrs)     Types: Cigarettes     Quit date: 2023     Years since quittin.7     Passive exposure: Past    Smokeless tobacco: Former     Quit date: 2023   Vaping Use    Vaping status: Never Used   Substance and Sexual Activity    Alcohol use: Not Currently    Drug use: Never    Sexual activity: Not Currently     Partners: Female     Birth control/protection: None         Objective     /62 (BP Location: Right arm, Patient Position: Sitting, Cuff Size:  Large)   Pulse 97   Temp 97.6 °F (36.4 °C) (Tympanic)   Wt 102 kg (224 lb 10.4 oz)   SpO2 98%   BMI 35.18 kg/m²     Physical Exam  Vitals reviewed.   Constitutional:       General: He is not in acute distress.     Appearance: He is obese. He is not ill-appearing.   HENT:      Head: Normocephalic and atraumatic.      Right Ear: External ear normal.      Left Ear: External ear normal.      Nose: Nose normal.   Eyes:      Extraocular Movements: Extraocular movements intact.      Conjunctiva/sclera: Conjunctivae normal.   Cardiovascular:      Rate and Rhythm: Normal rate.      Pulses: no weak pulses.           Dorsalis pedis pulses are 2+ on the right side and 2+ on the left side.        Posterior tibial pulses are 2+ on the right side and 2+ on the left side.   Pulmonary:      Effort: Pulmonary effort is normal.   Musculoskeletal:      Cervical back: Neck supple.      Right lower leg: No edema.      Left lower leg: No edema.   Feet:      Right foot:      Skin integrity: No ulcer, skin breakdown, erythema, warmth, callus or dry skin.      Left foot:      Skin integrity: No ulcer, skin breakdown, erythema, warmth, callus or dry skin.   Neurological:      General: No focal deficit present.      Mental Status: He is alert.   Psychiatric:         Mood and Affect: Mood normal.         Behavior: Behavior normal.       Patient's shoes and socks removed.    Right Foot/Ankle   Right Foot Inspection  Skin Exam: skin normal and skin intact. No dry skin, no warmth, no callus, no erythema, no maceration, no abnormal color, no pre-ulcer, no ulcer and no callus.     Toe Exam: ROM and strength within normal limits.     Sensory   Proprioception: intact  Monofilament testing: intact    Vascular  Capillary refills: < 3 seconds  The right DP pulse is 2+. The right PT pulse is 2+.     Left Foot/Ankle  Left Foot Inspection  Skin Exam: skin normal and skin intact. No dry skin, no warmth, no erythema, no maceration, normal color, no  pre-ulcer, no ulcer and no callus.     Toe Exam: ROM and strength within normal limits.     Sensory   Proprioception: intact  Monofilament testing: intact    Vascular  Capillary refills: < 3 seconds  The left DP pulse is 2+. The left PT pulse is 2+.     Assign Risk Category  No deformity present  No loss of protective sensation  No weak pulses  Risk: 0

## 2024-09-27 NOTE — ASSESSMENT & PLAN NOTE
Lab Results   Component Value Date    HGBA1C 6.5 (H) 05/01/2024     New onset as of 2024  Continue Metformin ER 500mg daily  On statin  Needs DM eye exam - previously referred  DM foot exam completed and normal  Check labs and follow up 4 mo

## 2024-09-27 NOTE — ASSESSMENT & PLAN NOTE
Quit smoking December 2023  Prior heavy smoker, 3 packs a day 40 years  Continue to monitor  Continue annual CT lung screenings in April

## 2024-10-01 ENCOUNTER — PATIENT MESSAGE (OUTPATIENT)
Dept: FAMILY MEDICINE CLINIC | Facility: CLINIC | Age: 54
End: 2024-10-01

## 2024-10-01 DIAGNOSIS — E78.2 MIXED HYPERLIPIDEMIA: ICD-10-CM

## 2024-10-01 DIAGNOSIS — I10 ESSENTIAL HYPERTENSION: ICD-10-CM

## 2024-10-01 DIAGNOSIS — E11.9 TYPE 2 DIABETES MELLITUS WITHOUT COMPLICATION, WITHOUT LONG-TERM CURRENT USE OF INSULIN (HCC): Primary | ICD-10-CM

## 2024-10-01 DIAGNOSIS — I25.10 CORONARY ARTERY DISEASE INVOLVING NATIVE CORONARY ARTERY OF NATIVE HEART WITHOUT ANGINA PECTORIS: ICD-10-CM

## 2024-10-07 ENCOUNTER — OFFICE VISIT (OUTPATIENT)
Dept: PODIATRY | Facility: CLINIC | Age: 54
End: 2024-10-07
Payer: COMMERCIAL

## 2024-10-07 VITALS
SYSTOLIC BLOOD PRESSURE: 126 MMHG | DIASTOLIC BLOOD PRESSURE: 82 MMHG | WEIGHT: 224 LBS | HEIGHT: 67 IN | RESPIRATION RATE: 16 BRPM | BODY MASS INDEX: 35.16 KG/M2 | HEART RATE: 91 BPM | OXYGEN SATURATION: 99 % | TEMPERATURE: 98.5 F

## 2024-10-07 DIAGNOSIS — M89.8X7 EXOSTOSIS OF BONE OF FOOT: Primary | ICD-10-CM

## 2024-10-07 DIAGNOSIS — M79.672 LEFT FOOT PAIN: ICD-10-CM

## 2024-10-07 PROCEDURE — 99202 OFFICE O/P NEW SF 15 MIN: CPT | Performed by: PODIATRIST

## 2024-10-07 NOTE — PROGRESS NOTES
Ambulatory Visit  Name: Christopher Smalls Jr.      : 1970      MRN: [id number]  Encounter Provider: Jimbo Hearn DPM  Encounter Date: 10/7/2024   Encounter department: [facility] [place]    Assessment & Plan  Left foot pain    Orders:    Ambulatory Referral to Podiatry    Exostosis of bone of foot       Explained to the patient that the only way to permanently get rid of the bone spur is with the surgery and briefly explored explained surgery to the patient.    For the surgical consult he was referred to Dr. Fischer.  Explained to him that the only other treatment is to pad the area to take pressure off of when the shoes are pressing down on it so he was dispensed some quarter inch felt with a cut out.  An example of how to place it on his foot was done and it was recommended that he rotate the positioning of the pads daily as he is changing them to allow the skin to heal under the adhesive and not become sensitive.    Return if symptoms worsen or fail to improve.     History of Present Illness     Christopher Smalls Jr. is a 54 y.o. male who presents with chief complaint of pain on the top of his left foot.  He states he also has some pain on his right but not as severe.  He relates has been problematic now for past couple months and it makes wearing sneakers or shoes difficult.    History obtained from : patient  Review of Systems  Medical History Reviewed by provider this encounter:       Current Outpatient Medications on File Prior to Visit   Medication Sig Dispense Refill    acetaminophen (TYLENOL) 325 mg tablet Take 1 tablet (325 mg total) by mouth every 6 (six) hours as needed for mild pain 30 tablet 0    amitriptyline (ELAVIL) 25 mg tablet 50 mg      amLODIPine (NORVASC) 5 mg tablet Take 1 tablet by mouth once daily 90 tablet 1    atorvastatin (LIPITOR) 80 mg tablet Take 1 tablet by mouth once daily 90 tablet 1    DULoxetine (CYMBALTA) 60 mg delayed release capsule Take 1 capsule by mouth  "once daily 90 capsule 1    ezetimibe (ZETIA) 10 mg tablet Take 1 tablet by mouth once daily 90 tablet 1    metFORMIN (GLUCOPHAGE-XR) 500 mg 24 hr tablet Take 1 tablet (500 mg total) by mouth daily with breakfast 90 tablet 0    metoprolol succinate (TOPROL-XL) 25 mg 24 hr tablet Take 1 tablet by mouth once daily 90 tablet 1    omeprazole (PriLOSEC) 20 mg delayed release capsule Take 1 capsule (20 mg total) by mouth daily 90 capsule 1    pregabalin (LYRICA) 75 mg capsule TAKE 1 CAPSULE BY MOUTH THREE TIMES DAILY 90 capsule 0    semaglutide, 0.25 or 0.5 mg/dose, (Ozempic, 0.25 or 0.5 MG/DOSE,) 2 mg/3 mL injection pen Inject 0.375 mL (0.25 mg total) under the skin every 7 days for 28 days, THEN 0.75 mL (0.5 mg total) every 7 days for 28 days. 0.25 mg under the skin every 7 days for 4 doses (28 days), THEN 0.5 mg under the skin every 7 days. 9 mL 0    SUMAtriptan (IMITREX) 100 mg tablet Take 1 tablet (100 mg total) by mouth as needed for migraine May repeat in 2 hours. Limit of 3/week or 9/month 16 tablet 3    aspirin 81 mg chewable tablet Chew 1 tablet (81 mg total) daily 30 tablet 3     No current facility-administered medications on file prior to visit.      Social History     Tobacco Use    Smoking status: Former     Current packs/day: 0.00     Average packs/day: 3.0 packs/day for 49.0 years (147.0 ttl pk-yrs)     Types: Cigarettes     Quit date: 2023     Years since quittin.8     Passive exposure: Past    Smokeless tobacco: Former     Quit date: 2023   Vaping Use    Vaping status: Never Used   Substance and Sexual Activity    Alcohol use: Not Currently    Drug use: Never    Sexual activity: Not Currently     Partners: Female     Birth control/protection: None         Objective     /82   Pulse 91   Temp 98.5 °F (36.9 °C)   Resp 16   Ht 5' 7\" (1.702 m)   Wt 102 kg (224 lb)   SpO2 99%   BMI 35.08 kg/m²     Physical Exam  Vascular status is 2/4 DP PT negative digital hair normal distal " cooling immediate capillary refill left extremity     Derm there is dry flaky tissue present on the plantar aspect of both feet especially in the heel area no signs of any fissures or dried blood present.    Ortho there is an exostosis present in the area of the cuneiforms which can be palpated through the skin.  It lies under vein.  Feels like it measures approximately half a centimeter by half a centimeter.    Neuro is intact on the left foot.    Administrative Statements   I have spent a total time of 15 minutes in caring for this patient on the day of the visit/encounter including Risks and benefits of tx options, Instructions for management, Patient and family education, Importance of tx compliance, Counseling / Coordination of care, Documenting in the medical record, Reviewing / ordering tests, medicine, procedures  , and Obtaining or reviewing history  .

## 2024-10-09 DIAGNOSIS — E11.9 TYPE 2 DIABETES MELLITUS WITHOUT COMPLICATION, WITHOUT LONG-TERM CURRENT USE OF INSULIN (HCC): ICD-10-CM

## 2024-10-10 RX ORDER — METFORMIN HCL 500 MG
500 TABLET, EXTENDED RELEASE 24 HR ORAL
Qty: 90 TABLET | Refills: 1 | Status: SHIPPED | OUTPATIENT
Start: 2024-10-10

## 2024-10-21 ENCOUNTER — APPOINTMENT (OUTPATIENT)
Dept: LAB | Facility: HOSPITAL | Age: 54
End: 2024-10-21

## 2024-10-21 DIAGNOSIS — Z00.6 ENCOUNTER FOR EXAMINATION FOR NORMAL COMPARISON OR CONTROL IN CLINICAL RESEARCH PROGRAM: ICD-10-CM

## 2024-10-21 PROCEDURE — 36415 COLL VENOUS BLD VENIPUNCTURE: CPT

## 2024-10-22 DIAGNOSIS — M54.16 LUMBAR RADICULOPATHY: ICD-10-CM

## 2024-10-22 DIAGNOSIS — G43.109 MIGRAINE WITH AURA AND WITHOUT STATUS MIGRAINOSUS, NOT INTRACTABLE: Primary | ICD-10-CM

## 2024-10-22 DIAGNOSIS — G43.109 MIGRAINE WITH AURA AND WITHOUT STATUS MIGRAINOSUS, NOT INTRACTABLE: ICD-10-CM

## 2024-10-22 RX ORDER — AMITRIPTYLINE HYDROCHLORIDE 50 MG/1
50 TABLET ORAL
Qty: 90 TABLET | Refills: 1 | Status: SHIPPED | OUTPATIENT
Start: 2024-10-22

## 2024-10-22 RX ORDER — PREGABALIN 75 MG/1
75 CAPSULE ORAL 3 TIMES DAILY
Qty: 90 CAPSULE | Refills: 0 | Status: SHIPPED | OUTPATIENT
Start: 2024-10-22

## 2024-10-22 RX ORDER — SUMATRIPTAN 100 MG/1
100 TABLET, FILM COATED ORAL AS NEEDED
Qty: 16 TABLET | Refills: 0 | Status: SHIPPED | OUTPATIENT
Start: 2024-10-22

## 2024-10-22 NOTE — TELEPHONE ENCOUNTER
09/16/2024 09/16/2024 Pregabalin (Capsule) 90.0 30 75 MG NA [facility]  06/03/2024 02/26/2024 Pregabalin (Capsule) 90.0 30 75 MG NA [facility]

## 2024-11-01 ENCOUNTER — OFFICE VISIT (OUTPATIENT)
Dept: CARDIOLOGY CLINIC | Facility: CLINIC | Age: 54
End: 2024-11-01
Payer: COMMERCIAL

## 2024-11-01 VITALS
HEART RATE: 77 BPM | TEMPERATURE: 98 F | WEIGHT: 226 LBS | OXYGEN SATURATION: 95 % | HEIGHT: 67 IN | DIASTOLIC BLOOD PRESSURE: 80 MMHG | SYSTOLIC BLOOD PRESSURE: 132 MMHG | BODY MASS INDEX: 35.47 KG/M2

## 2024-11-01 DIAGNOSIS — I35.1 NONRHEUMATIC AORTIC VALVE INSUFFICIENCY: ICD-10-CM

## 2024-11-01 DIAGNOSIS — I10 ESSENTIAL HYPERTENSION: ICD-10-CM

## 2024-11-01 DIAGNOSIS — I25.10 CORONARY ARTERY DISEASE INVOLVING NATIVE CORONARY ARTERY OF NATIVE HEART WITHOUT ANGINA PECTORIS: Primary | ICD-10-CM

## 2024-11-01 DIAGNOSIS — E11.9 TYPE 2 DIABETES MELLITUS WITHOUT COMPLICATION, WITHOUT LONG-TERM CURRENT USE OF INSULIN (HCC): ICD-10-CM

## 2024-11-01 DIAGNOSIS — I34.0 NONRHEUMATIC MITRAL VALVE REGURGITATION: ICD-10-CM

## 2024-11-01 LAB
APOB+LDLR+PCSK9 GENE MUT ANL BLD/T: NOT DETECTED
BRCA1+BRCA2 DEL+DUP + FULL MUT ANL BLD/T: NOT DETECTED
MLH1+MSH2+MSH6+PMS2 GN DEL+DUP+FUL M: NOT DETECTED

## 2024-11-01 PROCEDURE — 99214 OFFICE O/P EST MOD 30 MIN: CPT | Performed by: INTERNAL MEDICINE

## 2024-11-01 NOTE — ASSESSMENT & PLAN NOTE
Stable CAD with no angina.  Treadmill stress-MPI in March 2024 showed no evidence of ischemia or infarction with normal left ventricular systolic function.  However patient was only able to achieve 80% of maximum predicted heart rate.  Will continue to monitor clinically as long as he does not have any angina symptoms.

## 2024-11-01 NOTE — ASSESSMENT & PLAN NOTE
Lab Results   Component Value Date    HGBA1C 6.5 (H) 05/01/2024   Glycemic control is fair but with further activity and weight loss, it can be improved even more.

## 2024-11-01 NOTE — ASSESSMENT & PLAN NOTE
Echocardiogram in March 2024 showed mild to moderate aortic regurgitation with mild mitral regurgitation.  We will reimage in 1 to 2 years.

## 2024-11-01 NOTE — PROGRESS NOTES
[facility] [place]  [address]  [address]  [facility] [id number] [phone]    Patient Name: Christopher Smalls Jr.  YOB: 1970 ;male  MR No: [id number]        Diagnosis ICD-10-CM Associated Orders   1. Coronary artery disease involving native coronary artery of native heart without angina pectoris  I25.10       2. Nonrheumatic aortic valve insufficiency  I35.1       3. Essential hypertension  I10       4. Nonrheumatic mitral valve regurgitation  I34.0       5. Type 2 diabetes mellitus without complication, without long-term current use of insulin (HCC)  E11.9            Assessment and recommendations:    1. Coronary artery disease involving native coronary artery of native heart without angina pectoris  Assessment & Plan:  Stable CAD with no angina.  Treadmill stress-MPI in March 2024 showed no evidence of ischemia or infarction with normal left ventricular systolic function.  However patient was only able to achieve 80% of maximum predicted heart rate.  Will continue to monitor clinically as long as he does not have any angina symptoms.  2. Nonrheumatic aortic valve insufficiency  Assessment & Plan:  Echocardiogram in March 2024 showed mild to moderate aortic regurgitation with mild mitral regurgitation.  We will reimage in 1 to 2 years.  3. Essential hypertension  Assessment & Plan:  Blood pressure remains fairly well-controlled on current medications.  4. Nonrheumatic mitral valve regurgitation  5. Type 2 diabetes mellitus without complication, without long-term current use of insulin (HCC)  Assessment & Plan:    Lab Results   Component Value Date    HGBA1C 6.5 (H) 05/01/2024   Glycemic control is fair but with further activity and weight loss, it can be improved even more.        CHIEF COMPLAINT:          HPI:   54-year-old male (poor historian) with past medical history significant for coronary artery disease status post diagnostic coronary  angiography in October 2019 showing significant disease in small branch of first marginal which was stented as well as significant disease in a septal/accessory LAD which was stented as well at [facility].  Right coronary artery had 40% stenosis at that time.  Chronic migraines, hypertension, pre-diabetes, TIA  (in 2022) and hyperlipidemia who presents for follow-up visit.    He is doing well and denies any current  angina, palpitations or syncope.  He does admit to mild chronic dyspnea on exertion.    Patient reports that he had chest pain in 2019 which led to the coronary angiography and intervention with resolution of his symptoms.  He gets occasional atypical chest discomfort.  He has been active and complained of mild CP and that lead to a stress test in 3-2024 which showed no obvious ischemia.    Past Medical History:   Diagnosis Date    Abnormal brain MRI     Aortic regurgitation     Aortic valve mass     Arthritis     Arthritis     Back pain     Bowel obstruction (HCC)     CAD in native artery     Coronary arteriosclerosis     Coronary artery disease     Diabetes (HCC)     ED (erectile dysfunction)     Elevated PSA     GE reflux     Hand tingling     Headache     Headache, tension-type     Heart attack (HCC)     Heart murmur     High cholesterol     Hypertension     Hypertension     Left leg pain     Low back pain     Lumbar radiculopathy     Migraine     Mitral regurgitation     Mood disorder (HCC)     Myocardial infarct (HCC)     Osteoarthritis of wrist     Osteoporosis     Prediabetes     Prostate cancer (HCC)     Psychiatric disorder     Right shoulder pain     Sciatica     Spinal osteoarthritis     Sprain rotator cuff     Sprain, lumbar     Stress incontinence     TIA (transient ischemic attack)     Trigger ring finger of right hand     Urinary leakage     Weight disorder           CURRENT  MEDICATIONS:      Current Outpatient Medications:     acetaminophen (TYLENOL) 325 mg tablet, Take 1 tablet (325 mg  total) by mouth every 6 (six) hours as needed for mild pain, Disp: 30 tablet, Rfl: 0    amitriptyline (ELAVIL) 50 mg tablet, Take 1 tablet (50 mg total) by mouth daily at bedtime, Disp: 90 tablet, Rfl: 1    amLODIPine (NORVASC) 5 mg tablet, Take 1 tablet by mouth once daily, Disp: 90 tablet, Rfl: 1    aspirin 81 mg chewable tablet, Chew 1 tablet (81 mg total) daily, Disp: 30 tablet, Rfl: 3    atorvastatin (LIPITOR) 80 mg tablet, Take 1 tablet by mouth once daily, Disp: 90 tablet, Rfl: 1    DULoxetine (CYMBALTA) 60 mg delayed release capsule, Take 1 capsule by mouth once daily, Disp: 90 capsule, Rfl: 1    ezetimibe (ZETIA) 10 mg tablet, Take 1 tablet by mouth once daily, Disp: 90 tablet, Rfl: 1    metFORMIN (GLUCOPHAGE-XR) 500 mg 24 hr tablet, Take 1 tablet by mouth once daily with breakfast, Disp: 90 tablet, Rfl: 1    metoprolol succinate (TOPROL-XL) 25 mg 24 hr tablet, Take 1 tablet by mouth once daily, Disp: 90 tablet, Rfl: 1    omeprazole (PriLOSEC) 20 mg delayed release capsule, Take 1 capsule (20 mg total) by mouth daily, Disp: 90 capsule, Rfl: 1    pregabalin (LYRICA) 75 mg capsule, Take 1 capsule (75 mg total) by mouth 3 (three) times a day, Disp: 90 capsule, Rfl: 0    semaglutide, 0.25 or 0.5 mg/dose, (Ozempic, 0.25 or 0.5 MG/DOSE,) 2 mg/3 mL injection pen, Inject 0.375 mL (0.25 mg total) under the skin every 7 days for 28 days, THEN 0.75 mL (0.5 mg total) every 7 days for 28 days. 0.25 mg under the skin every 7 days for 4 doses (28 days), THEN 0.5 mg under the skin every 7 days., Disp: 9 mL, Rfl: 0    SUMAtriptan (IMITREX) 100 mg tablet, Take 1 tablet (100 mg total) by mouth as needed for migraine May repeat in 2 hours. Limit of 3/week or 9/month, Disp: 16 tablet, Rfl: 0    ALLERGIES  No Known Allergies    Lab Results   Component Value Date    LDLCALC 55 05/01/2024    LDLCALC 49 03/05/2024    HDL 34 (L) 05/01/2024    HDL 38 (L) 03/05/2024    CHOLESTEROL 116 05/01/2024    CHOLESTEROL 110 03/05/2024    TRIG  137 05/01/2024    TRIG 114 03/05/2024    CREATININE 0.90 05/01/2024    CREATININE 0.78 03/05/2024    K 4.0 05/01/2024    K 3.9 03/05/2024    SODIUM 139 05/01/2024    SODIUM 140 03/05/2024         REVIEW OF SYSTEMS   Positive for: Osteoarthritis, shoulder arthritis, atypical chest pain  Negative for: All remaining as reviewed below and in HPI.    SYSTEM SYMPTOMS REVIEWED:  General--weight change, fever, night sweats  Respiratory--cough, wheezing, shortness of breath, sputum production  Cardiovascular--chest pain, syncope, dyspnea on exertion, edema, decline in exercise tolerance, claudication   Gastrointestinal--persistent vomiting, diarrhea, abdominal distention, blood in stool   Muscular or skeletal--joint pain or swelling   Neurologic--headaches, syncope, abnormal movement  Hematologic--history of easy bruising and bleeding   Endocrine--thyroid enlargement, heat or cold intolerance, polyuria   Psychiatric--anxiety, depression     General physical examination:    General appearance: Alert, no acute distress, appears stated age, moderate obesity.  HEENT: Mucous membranes are moist.  No obvious abnormality noted.  Neck: Supple with no lymphadenopathy.  No JVD.  Carotid pulses are intact.  No carotid bruit.  Cardiovascular system: Regular rhythm.  Normal S1 and S2.  Soft 1/6 systolic murmur at the base..  No rubs or gallops. Extremities: No edema. No cyanosis.  Pulmonary: Respirations unlabored.  Good air entry bilaterally.  Clear to auscultation bilaterally.  Gastrointestinal: Abdomen is soft and nontender.  Bowel sounds are positive.  Musculoskeletal: Upper Extremities: Normal upper motor strength. Lower Extremity: Normal motor strength. Gait: Normal.   Skin: Skin is warm. No rashes or lesions.  Neurological: Patient is alert and oriented with no gross motor deficits.  Psychiatric: Mood is normal.  Behavior is normal.    Vitals:    11/01/24 1353   BP: 132/80   Pulse: 77   Temp: 98 °F (36.7 °C)   SpO2: 95%      Body  "mass index is 35.4 kg/m².  Wt Readings from Last 3 Encounters:   11/01/24 103 kg (226 lb)   10/07/24 102 kg (224 lb)   09/27/24 102 kg (224 lb 10.4 oz)             Blaze Farooq MD, FACC, [place]    Portions of the record  have been created with voice recognition software.  Occasional grammatical mistakes or wrong word or \"sound alike\" substitutions may have occurred due to the inherent limitations of voice recognition software. Please reach out to me directly for any clarifications.   "

## 2024-11-11 ENCOUNTER — OFFICE VISIT (OUTPATIENT)
Dept: PAIN MEDICINE | Facility: CLINIC | Age: 54
End: 2024-11-11
Payer: COMMERCIAL

## 2024-11-11 ENCOUNTER — PREP FOR PROCEDURE (OUTPATIENT)
Dept: PAIN MEDICINE | Facility: CLINIC | Age: 54
End: 2024-11-11

## 2024-11-11 VITALS
TEMPERATURE: 97.6 F | WEIGHT: 226 LBS | DIASTOLIC BLOOD PRESSURE: 84 MMHG | OXYGEN SATURATION: 98 % | SYSTOLIC BLOOD PRESSURE: 128 MMHG | BODY MASS INDEX: 35.47 KG/M2 | RESPIRATION RATE: 18 BRPM | HEART RATE: 86 BPM | HEIGHT: 67 IN

## 2024-11-11 DIAGNOSIS — M16.11 PRIMARY OSTEOARTHRITIS OF RIGHT HIP: ICD-10-CM

## 2024-11-11 DIAGNOSIS — M47.26 OTHER SPONDYLOSIS WITH RADICULOPATHY, LUMBAR REGION: Primary | ICD-10-CM

## 2024-11-11 DIAGNOSIS — M16.11 PRIMARY OSTEOARTHRITIS OF RIGHT HIP: Primary | ICD-10-CM

## 2024-11-11 PROCEDURE — 99214 OFFICE O/P EST MOD 30 MIN: CPT | Performed by: ANESTHESIOLOGY

## 2024-11-11 NOTE — PROGRESS NOTES
Assessment  1. Other spondylosis with radiculopathy, lumbar region  -     MRI lumbar spine wo contrast; Future; Expected date: 11/11/2024  2. Primary osteoarthritis of right hip    Right sided lumbar radicular pain in the L4 dermatomal distribution accompanied by pain limited weakness numbness and paresthesias.  Patient has not yet participated with PT. Chronic pain with decreased participation with IADLs over the past 3 months.  Has been taking OTC ibuprofen and tylenol in addition to gabapentin with modest benefit.  5/5 strength bilaterally, positive SLR right sided. Reflexes 2+.  Additionally there is positive facet loading,  right greater than left Denies any gait instability, saddle anesthesia. Xrays show spondylosis with disc degeneration of at least moderate in nature.  Risks, benefits alternatives epidural steroid injections thoroughly discussed with patient.  Handouts provided questions answered to patient's satisfaction.  Lifestyle modifications extensively discussed including diet, exercise and weight loss in addition to core strengthening.      Right sided hip pain described primarily by arthritic features.  Aching, nagging, indolent, stabbing, throbbing features in left hip radiating to left groin. Pain with internal and external rotation of right hip, ttp over rightGTB. Moderate right hip OA on xray. Risks, benefits and alternatives to left hip intra-articular joint injection thoroughly discussed with patient.  Handouts provided questions answered to patient satisfaction.    Plan  -MRI lumbar spine; will f/u result  -right hip intra-articular joint injection; f/u 2 weeks post procedure  -lyrica 75 mg t.i.d. Ordered for patient; to taper given decreased efficacy; counseled regarding sedative effects of taking this medication and provided up titration calendar.  Counseled not to take medication while driving or operating heavy machinery/using stairs  -has been participant with formal physical therapy  for right-sided lumbar radiculopathy, hip pain; Physician directed home exercise plan as per [employer] demonstrated and handouts provided that patient plans to participate with for 1 hour, twice a week for the next 6 weeks.     There are risks associated with opioid medications, including dependence, addiction and tolerance. The patient understands and agrees to use these medications only as prescribed. Potential side effects of the medications include, but are not limited to, constipation, drowsiness, addiction, impaired judgment and risk of fatal overdose if not taken as prescribed. The patient was warned against driving while taking sedation medications or operating heavy machinery. The patient voiced understanding. Sharing medications is a felony. At this point in time, the patient is showing no signs of addiction, abuse, diversion or suicidal ideation.     [place] Prescription Drug Monitoring Program report was reviewed and was appropriate      Complete risks and benefits including bleeding, infection, tissue reaction, nerve injury and allergic reaction were discussed. The approach was demonstrated using models and literature was provided. Verbal and written consent was obtained.     My impressions and treatment recommendations were discussed in detail with the patient who verbalized understanding and had no further questions.  Discharge instructions were provided. I personally saw and examined the patient and I agree with the above discussed plan of care.    No orders of the defined types were placed in this encounter.      History of Present Illness    Christopher Smalls JrMaicol is a 54 y.o. male with pmhx of HTN, XOL, migraines presenting with chronic lumbar radicular pain in the L4 dermatomal distributions. Debilitating pain limited weakness numbness and paresthesias accompany the pain. The patient rates the pain at a 8/10 accompanied by electric shock-like shooting features and crampy burning pain in the  aforementioned dermatomal distributions.  The pain is worse in the mornings as well as the end of the day; exertion such as walking for long periods of time seems to exacerbate the pain. He tries to maintain an active lifestyle and finds that the current degree of pain seems to compromises his efforts.  The pain significantly impacts independent activities of daily living and contributes to significant disability.  He has attempted PT this year with modest relief.  He has taken naproxen, tylenol as well as gabapentin with limited relief of the pain as well.  He has never tried epidural steroid injections in the past. He denies any bowel or bladder dysfunction/incontinence, saddle anesthesia or gait instability.    Right hip pain described primarily as arthritic in nature. 8/10 right hip pain that is worse in the mornings and worse at the end of the day.  The pain is characterized by achy, nagging, indolent, crampy, stabbing pain in right hip.  The patient describes that the pain is worse with standing for long periods of time on hard surfaces as well as with walking.  The patient is a very active individual and feels as though this pain compromises his participation with independent activities of daily living. The pain can be debilitating at times and contribute to significant disability, compromising overall activity and independent activities of daily living.     I have personally reviewed and/or updated the patient's past medical history, past surgical history, family history, social history, current medications, allergies, and vital signs today.     Review of Systems   Constitutional:  Positive for activity change.   HENT: Negative.     Eyes: Negative.    Respiratory: Negative.     Cardiovascular: Negative.    Gastrointestinal: Negative.    Endocrine: Negative.    Genitourinary: Negative.    Musculoskeletal:  Positive for arthralgias, back pain, gait problem and myalgias.   Skin: Negative.     Allergic/Immunologic: Negative.    Neurological:  Positive for weakness and numbness.   Hematological: Negative.    Psychiatric/Behavioral: Negative.     All other systems reviewed and are negative.      Patient Active Problem List   Diagnosis    Abnormal brain MRI    Aortic regurgitation    Aortic valve sclerosis    Mitral regurgitation    Aortic valve mass    Coronary artery disease involving native coronary artery of native heart without angina pectoris    Essential hypertension    GE reflux    Mixed hyperlipidemia    History of tobacco use disorder    TIA (transient ischemic attack)    Lumbar radiculopathy    Prostate CA (HCC)    Type 2 diabetes mellitus without complication, without long-term current use of insulin (HCC)    Depression    Chronic pain of both shoulders    History of prostate cancer    Bilateral hip pain    History of cardiomyopathy    Migraine with aura and without status migrainosus, not intractable       Past Medical History:   Diagnosis Date    Abnormal brain MRI     Aortic regurgitation     Aortic valve mass     Arthritis     Arthritis     Back pain     Bowel obstruction (HCC)     CAD in native artery     Coronary arteriosclerosis     Coronary artery disease     Diabetes (HCC)     ED (erectile dysfunction)     Elevated PSA     GE reflux     Hand tingling     Headache     Headache, tension-type     Heart attack (HCC)     Heart murmur     High cholesterol     Hypertension     Hypertension     Left leg pain     Low back pain     Lumbar radiculopathy     Migraine     Mitral regurgitation     Mood disorder (HCC)     Myocardial infarct (HCC)     Osteoarthritis of wrist     Osteoporosis     Prediabetes     Prostate cancer (HCC)     Psychiatric disorder     Right shoulder pain     Sciatica     Spinal osteoarthritis     Sprain rotator cuff     Sprain, lumbar     Stress incontinence     TIA (transient ischemic attack)     Trigger ring finger of right hand     Urinary leakage     Weight disorder         Past Surgical History:   Procedure Laterality Date    APPENDECTOMY      CARDIAC CATHETERIZATION      CARDIAC SURGERY      CARDIOVASCULAR STRESS TEST      CHOLECYSTECTOMY      COLECTOMY      partial for bowel obstruction    HERNIA REPAIR      OTHER SURGICAL HISTORY      Stent placement    VT SURGICAL ARTHROSCOPY SHOULDER W/ROTATOR CUFF RPR Right 3/13/2024    Procedure: REPAIR ROTATOR CUFF  ARTHROSCOPIC, subacromial decompression;  Surgeon: Randy Blue MD;  Location: [facility];  Service: Orthopedics    PROSTATE SURGERY      Radical prostatectomy       Family History   Problem Relation Age of Onset    Breast cancer Mother     Hypertension Mother     Heart disease Father     Hypertension Father     Prostate cancer Father     Rheum arthritis Father     Stroke Father     Heart disease Sister     Hypertension Sister     Other Sister         Resp. disease       Social History     Occupational History    Not on file   Tobacco Use    Smoking status: Every Day     Current packs/day: 1.00     Average packs/day: 3.0 packs/day for 49.6 years (147.6 ttl pk-yrs)     Types: Cigarettes     Start date: 4/15/2024     Last attempt to quit: 12/20/2023     Passive exposure: Past    Smokeless tobacco: Never   Vaping Use    Vaping status: Every Day    Start date: 9/20/2024    Substances: Nicotine   Substance and Sexual Activity    Alcohol use: Not Currently    Drug use: Not Currently     Types: Marijuana, Cocaine    Sexual activity: Not Currently     Partners: Female     Birth control/protection: None       Current Outpatient Medications on File Prior to Visit   Medication Sig    acetaminophen (TYLENOL) 325 mg tablet Take 1 tablet (325 mg total) by mouth every 6 (six) hours as needed for mild pain (Patient taking differently: Take 325 mg by mouth every 6 (six) hours as needed for mild pain As needed)    amitriptyline (ELAVIL) 50 mg tablet Take 1 tablet (50 mg total) by mouth daily at bedtime    amLODIPine (NORVASC) 5 mg tablet  "Take 1 tablet by mouth once daily    atorvastatin (LIPITOR) 80 mg tablet Take 1 tablet by mouth once daily    DULoxetine (CYMBALTA) 60 mg delayed release capsule Take 1 capsule by mouth once daily    ezetimibe (ZETIA) 10 mg tablet Take 1 tablet by mouth once daily    metFORMIN (GLUCOPHAGE-XR) 500 mg 24 hr tablet Take 1 tablet by mouth once daily with breakfast    metoprolol succinate (TOPROL-XL) 25 mg 24 hr tablet Take 1 tablet by mouth once daily    omeprazole (PriLOSEC) 20 mg delayed release capsule Take 1 capsule (20 mg total) by mouth daily    pregabalin (LYRICA) 75 mg capsule Take 1 capsule (75 mg total) by mouth 3 (three) times a day    semaglutide, 0.25 or 0.5 mg/dose, (Ozempic, 0.25 or 0.5 MG/DOSE,) 2 mg/3 mL injection pen Inject 0.375 mL (0.25 mg total) under the skin every 7 days for 28 days, THEN 0.75 mL (0.5 mg total) every 7 days for 28 days. 0.25 mg under the skin every 7 days for 4 doses (28 days), THEN 0.5 mg under the skin every 7 days.    SUMAtriptan (IMITREX) 100 mg tablet Take 1 tablet (100 mg total) by mouth as needed for migraine May repeat in 2 hours. Limit of 3/week or 9/month    [DISCONTINUED] aspirin 81 mg chewable tablet Chew 1 tablet (81 mg total) daily (Patient not taking: Reported on 11/11/2024)     No current facility-administered medications on file prior to visit.       No Known Allergies      Physical Exam    /84 (BP Location: Left arm, Patient Position: Sitting, Cuff Size: Adult)   Pulse 86   Temp 97.6 °F (36.4 °C)   Resp 18   Ht 5' 7\" (1.702 m)   Wt 103 kg (226 lb)   SpO2 98%   BMI 35.40 kg/m²     Constitutional: normal, well developed, well nourished, alert, in no distress and non-toxic and no overt pain behavior. and obese  Eyes: anicteric  HEENT: grossly intact  Neck: supple, symmetric, trachea midline and no masses   Pulmonary:even and unlabored  Cardiovascular:No edema or pitting edema present  Skin:Normal without rashes or lesions and well " hydrated  Psychiatric:Mood and affect appropriate  Neurologic:Cranial Nerves II-XII grossly intact Sensation grossly intact; no clonus negative amezcua's. Reflexes 2+ and brisk. SLR negative bilaterally.   Musculoskeletal:normal gait. 5/5 strength bilaterally with AROM in lower extremities. Dififculty with normal heel toe and tip toe walking. Significant pain with lumbar facet loading bilaterally and with lateral spine rotation. ttp over lumbar paraspinal muscles. Negative emili's test, negative gaenslen's negative SIJ loading bilaterally. Ttp over right gtb, pain with internal and external rotation of right hip    Imaging    No pertinent imaging available to review at this time.

## 2024-11-11 NOTE — PATIENT INSTRUCTIONS
Patient Education     Hip Bursitis Exercises   About this topic   A bursa is a small, fluid-filled sac. It acts as a cushion between your bone and tendon. A tendon is a thick band that attaches your muscle to the bone. Bursae help the tendons glide and let your joints move easier. In the hip, there are three sets of bursae. There is one around the outer alejandro part of your hip joint. It is called the greater trochanteric bursae. Another set of bursae is in front of your hip near the groin area. These are called iliopsoas bursae. The last bursae is the ischial bursae. It covers the bones in your pelvis that you sit on. These bursae can get swollen and hurt. This problem is called hip bursitis. Exercises can help make this problem better.  General   Before starting with a program, ask your doctor if you are healthy enough to do these exercises. Your doctor may have you work with a  or physical therapist to make a safe exercise program to meet your needs.  Stretching Exercises   Stretching exercises keep your muscles flexible. They also stop them from getting tight. Start by doing each of these stretches 2 to 3 times. In order for your body to make changes, you will need to hold these stretches for 20 to 30 seconds. Try to do the stretches 2 to 3 times each day. Do all exercises slowly.  Single knee to chest stretches ? Lie on your back. Pull one knee towards your chest until you feel a stretch in your lower back and buttock area. Repeat with the other knee. If you have knee problems, pull your knee up by grabbing the back of your thigh instead of the front of your knee. You can also do this exercise by grabbing both knees at the same time.  Deep hip stretches lying down ? Lie on your back and bend one knee, keeping that foot flat on the floor. Cross the other leg over your knee. Pull the bottom leg towards your chest until you feel a stretch in the other buttock. Repeat using the opposite leg as the bottom  leg.  Hamstring stretches on back ? Lie on your back with both knees bent and feet flat on the floor. Grab the back of your left thigh. Straighten your knee until you feel a stretch at the back of your thigh. Now, pull your toes down towards your head. Repeat on the other leg.  Iliotibial band stretches:  To stretch the left IT band: Stand up with your right leg crossed over the left one. Try to point the toes of the feet towards each other. Your toes should almost be touching. This is a very awkward position. Lean your upper body towards the right while bending your right knee. You should feel a stretch near the left hip. Hold and repeat.  To stretch the right IT band: Stand up with your left leg crossed over the right one. Try to point the toes of the feet towards each other. Your toes should almost be touching. This is a very awkward position. Lean your upper body towards the left while bending your left knee. You should feel a stretch near the right hip. Hold and repeat.  Strengthening Exercises   Strengthening exercises keep your muscles firm and strong. Start by repeating each exercise 2 to 3 times. Work up to doing each exercise 10 times. Try to do the exercises 2 to 3 times each day. Do all exercises slowly.  Side leg lifts ? Lie on your side. Have your legs straight and lined up with your back. Lift the top leg up while keeping the knee straight. Do not let your leg go forward. Then, do this exercise on your other side.  Bent knee side leg lifts ? Lie on your side with your painful hip on top. Bend your knees up slightly and have your knees and feet together. Lift your top leg up while keeping your feet together. Lower your leg back down and repeat.               What will the results be?   Less pain and swelling  Better range of motion  Increased strength  Easier to walk and do other activities  Helpful tips   Stay active and work out to keep your muscles strong and flexible.  Keep a healthy weight to  avoid putting too much stress on your spine. Eat a healthy diet to keep your muscles healthy.  Be sure you do not hold your breath when exercising. This can raise your blood pressure. If you tend to hold your breath, try counting out loud when exercising. If any exercise bothers you, stop right away.  Always warm up before stretching. Heated muscles stretch much easier than cool muscles. Stretching cool muscles can lead to injury.  Try walking or cycling at an easy pace for a few minutes to warm up your muscles. Do this again after exercising.  Never bounce when doing stretches.  Doing exercises before a meal may be a good way to get into a routine.  After exercising, it is a good idea to use ice. Place an ice pack or a bag of frozen peas wrapped in a towel over the painful part. Never put ice right on the skin. Do not leave the ice on more than 10 to 15 minutes at a time. Ice after activity may help decrease pain and swelling. Never ice before stretching.  Exercise may be slightly uncomfortable, but you should not have sharp pains. If you do get sharp pains, stop what you are doing. If the sharp pains continue, call your doctor.  Avoid sitting on hard surfaces and use a cushion.  Last Reviewed Date   2024-05-09  Consumer Information Use and Disclaimer   This generalized information is a limited summary of diagnosis, treatment, and/or medication information. It is not meant to be comprehensive and should be used as a tool to help the user understand and/or assess potential diagnostic and treatment options. It does NOT include all information about conditions, treatments, medications, side effects, or risks that may apply to a specific patient. It is not intended to be medical advice or a substitute for the medical advice, diagnosis, or treatment of a health care provider based on the health care provider's examination and assessment of a patient’s specific and unique circumstances. Patients must speak with a health  care provider for complete information about their health, medical questions, and treatment options, including any risks or benefits regarding use of medications. This information does not endorse any treatments or medications as safe, effective, or approved for treating a specific patient. [employer] and its affiliates disclaim any warranty or liability relating to this information or the use thereof. The use of this information is governed by the Terms of Use, available at https://www.[employer].com/en/know/clinical-effectiveness-terms   Copyright   Copyright © 2024 [employer] and its affiliates and/or licensors. All rights reserved.  Patient Education     Core Strengthening Exercises on Back or on Hands and Knees   About this topic   Your core muscles are in your chest, back, buttock, and stomach area. They are your abdominal, back, and pelvis muscles. These muscles help keep your body stable when using your arms or legs. They also help with balance and posture. There are many exercises you can do to keep these muscles strong.  If you have back problems like a compression fracture or a ruptured disc, doing some of these exercises could make your problem worse. Some of these exercises may cause lower back pain.  General   Before starting with a program, ask your doctor if you are healthy enough to do these exercises. Your doctor may have you work with a , chiropractor, or physical therapist to make a safe exercise program to meet your needs.  Strengthening Exercises   Strengthening exercises keep your muscles firm and strong. Start by repeating each exercise 2 to 3 times. Work up to doing each exercise 10 times. Try to do the exercises 2 to 3 times each day. Hold each exercise for 3 to 5 seconds. Do all exercises slowly.  Hip lifts ? Lie on your back with your knees bent and feet flat on the floor. Tighten your stomach muscles and push your heels into the floor to lift your buttocks off the  floor. Relax.  Pelvic tilts ? Lie on your back with your knees bent and feet flat on the floor. Tighten your stomach muscles and press your lower back down to the floor. Relax.  Straight leg raises lying down ? Lie on your back with one leg straight. Bend your other knee so the foot is flat on the bed. Keeping your leg straight, lift the leg up to the level of your other knee. Lower it back down. Repeat with the other leg.  Knee flex lying down ? Lie on your back with both knees bent and your feet flat on the floor. Tighten your belly muscles. Raise one leg up and back down as if you are marching in slow motion. Keep belly muscles tight while you move your leg. Switch legs. To make this exercise harder, raise both arms straight up in the air. Tighten your belly muscles. When you raise one leg up, reach the opposite arm over your head. Switch, moving the opposite arm and leg until you have done 10 repetitions on each side.  Abdominal crunches ? Lie on your back with both knees bent. Keep your feet flat on the floor. Place your hands in one of these positions. Try starting with the first position since it is the easiest. As you get better, use the other positions to make it harder.  Crunches with arms at sides.  Crunches with arms across chest.  Crunches with arms behind head. Be careful not to interlock your fingers behind your neck or head while doing crunches. This may add tension to your neck and cause strain.  Look at the ceiling. Tighten your belly muscles and lift your shoulders and upper back off the floor. Breathe out while you are doing this. Lower your shoulders to the floor. Breathe in while you are doing this. Relax your belly muscles all the way before starting another crunch.  Arm and leg lifts on hands and knees ? Start on your hands and knees. With all of these exercises, keep your back as level as possible. If you are having trouble with this, you may want to put a small object on your back such as a  book. If it falls off, you are not keeping your back level enough during the exercise.  Lift one arm up to shoulder level and hold. Lower it back down. Now, lift up the other arm and hold.  Lift one leg up and kick it straight out until it is in line with your back and hold. Lower it back down. Now, lift up the other leg and hold.  Lift one arm and the OPPOSITE leg up at the same time and hold. Lower them down. Now, repeat using the other arm and leg. This is a very hard exercise. It may take time to be able to do this.               What will the results be?   Stronger core  Better balance  More toned belly and back muscles  Easier to do daily activities  Better sports performance  Less low back pain  Helpful tips   Stay active and work out to keep your muscles strong and flexible.  Keep a healthy weight to avoid putting too much stress on your spine. Eat a healthy diet to keep your muscles healthy.  Be sure you do not hold your breath when exercising. This can raise your blood pressure. If you tend to hold your breath, try counting out loud when exercising. If any exercise bothers you, stop right away.  Try walking or cycling at an easy pace for a few minutes to warm up your muscles. Do this again after exercising.  Exercise may be slightly uncomfortable, but you should not have sharp pains. If you do get sharp pains, stop what you are doing. If the sharp pains continue, call your doctor.  Last Reviewed Date   2021-03-18  Consumer Information Use and Disclaimer   This generalized information is a limited summary of diagnosis, treatment, and/or medication information. It is not meant to be comprehensive and should be used as a tool to help the user understand and/or assess potential diagnostic and treatment options. It does NOT include all information about conditions, treatments, medications, side effects, or risks that may apply to a specific patient. It is not intended to be medical advice or a substitute for  the medical advice, diagnosis, or treatment of a health care provider based on the health care provider's examination and assessment of a patient’s specific and unique circumstances. Patients must speak with a health care provider for complete information about their health, medical questions, and treatment options, including any risks or benefits regarding use of medications. This information does not endorse any treatments or medications as safe, effective, or approved for treating a specific patient. [employer] and its affiliates disclaim any warranty or liability relating to this information or the use thereof. The use of this information is governed by the Terms of Use, available at https://www.[employer].com/en/know/clinical-effectiveness-terms   Copyright   Copyright © 2024 [employer] and its affiliates and/or licensors. All rights reserved.

## 2024-11-11 NOTE — H&P (VIEW-ONLY)
Assessment  1. Other spondylosis with radiculopathy, lumbar region  -     MRI lumbar spine wo contrast; Future; Expected date: 11/11/2024  2. Primary osteoarthritis of right hip    Right sided lumbar radicular pain in the L4 dermatomal distribution accompanied by pain limited weakness numbness and paresthesias.  Patient has not yet participated with PT. Chronic pain with decreased participation with IADLs over the past 3 months.  Has been taking OTC ibuprofen and tylenol in addition to gabapentin with modest benefit.  5/5 strength bilaterally, positive SLR right sided. Reflexes 2+.  Additionally there is positive facet loading,  right greater than left Denies any gait instability, saddle anesthesia. Xrays show spondylosis with disc degeneration of at least moderate in nature.  Risks, benefits alternatives epidural steroid injections thoroughly discussed with patient.  Handouts provided questions answered to patient's satisfaction.  Lifestyle modifications extensively discussed including diet, exercise and weight loss in addition to core strengthening.      Right sided hip pain described primarily by arthritic features.  Aching, nagging, indolent, stabbing, throbbing features in left hip radiating to left groin. Pain with internal and external rotation of right hip, ttp over rightGTB. Moderate right hip OA on xray. Risks, benefits and alternatives to left hip intra-articular joint injection thoroughly discussed with patient.  Handouts provided questions answered to patient satisfaction.    Plan  -MRI lumbar spine; will f/u result  -right hip intra-articular joint injection; f/u 2 weeks post procedure  -lyrica 75 mg t.i.d. Ordered for patient; to taper given decreased efficacy; counseled regarding sedative effects of taking this medication and provided up titration calendar.  Counseled not to take medication while driving or operating heavy machinery/using stairs  -has been participant with formal physical therapy  for right-sided lumbar radiculopathy, hip pain; Physician directed home exercise plan as per [employer] demonstrated and handouts provided that patient plans to participate with for 1 hour, twice a week for the next 6 weeks.     There are risks associated with opioid medications, including dependence, addiction and tolerance. The patient understands and agrees to use these medications only as prescribed. Potential side effects of the medications include, but are not limited to, constipation, drowsiness, addiction, impaired judgment and risk of fatal overdose if not taken as prescribed. The patient was warned against driving while taking sedation medications or operating heavy machinery. The patient voiced understanding. Sharing medications is a felony. At this point in time, the patient is showing no signs of addiction, abuse, diversion or suicidal ideation.     [place] Prescription Drug Monitoring Program report was reviewed and was appropriate      Complete risks and benefits including bleeding, infection, tissue reaction, nerve injury and allergic reaction were discussed. The approach was demonstrated using models and literature was provided. Verbal and written consent was obtained.     My impressions and treatment recommendations were discussed in detail with the patient who verbalized understanding and had no further questions.  Discharge instructions were provided. I personally saw and examined the patient and I agree with the above discussed plan of care.    No orders of the defined types were placed in this encounter.      History of Present Illness    Christopher Smalls JrMaicol is a 54 y.o. male with pmhx of HTN, XOL, migraines presenting with chronic lumbar radicular pain in the L4 dermatomal distributions. Debilitating pain limited weakness numbness and paresthesias accompany the pain. The patient rates the pain at a 8/10 accompanied by electric shock-like shooting features and crampy burning pain in the  aforementioned dermatomal distributions.  The pain is worse in the mornings as well as the end of the day; exertion such as walking for long periods of time seems to exacerbate the pain. He tries to maintain an active lifestyle and finds that the current degree of pain seems to compromises his efforts.  The pain significantly impacts independent activities of daily living and contributes to significant disability.  He has attempted PT this year with modest relief.  He has taken naproxen, tylenol as well as gabapentin with limited relief of the pain as well.  He has never tried epidural steroid injections in the past. He denies any bowel or bladder dysfunction/incontinence, saddle anesthesia or gait instability.    Right hip pain described primarily as arthritic in nature. 8/10 right hip pain that is worse in the mornings and worse at the end of the day.  The pain is characterized by achy, nagging, indolent, crampy, stabbing pain in right hip.  The patient describes that the pain is worse with standing for long periods of time on hard surfaces as well as with walking.  The patient is a very active individual and feels as though this pain compromises his participation with independent activities of daily living. The pain can be debilitating at times and contribute to significant disability, compromising overall activity and independent activities of daily living.     I have personally reviewed and/or updated the patient's past medical history, past surgical history, family history, social history, current medications, allergies, and vital signs today.     Review of Systems   Constitutional:  Positive for activity change.   HENT: Negative.     Eyes: Negative.    Respiratory: Negative.     Cardiovascular: Negative.    Gastrointestinal: Negative.    Endocrine: Negative.    Genitourinary: Negative.    Musculoskeletal:  Positive for arthralgias, back pain, gait problem and myalgias.   Skin: Negative.     Allergic/Immunologic: Negative.    Neurological:  Positive for weakness and numbness.   Hematological: Negative.    Psychiatric/Behavioral: Negative.     All other systems reviewed and are negative.      Patient Active Problem List   Diagnosis    Abnormal brain MRI    Aortic regurgitation    Aortic valve sclerosis    Mitral regurgitation    Aortic valve mass    Coronary artery disease involving native coronary artery of native heart without angina pectoris    Essential hypertension    GE reflux    Mixed hyperlipidemia    History of tobacco use disorder    TIA (transient ischemic attack)    Lumbar radiculopathy    Prostate CA (HCC)    Type 2 diabetes mellitus without complication, without long-term current use of insulin (HCC)    Depression    Chronic pain of both shoulders    History of prostate cancer    Bilateral hip pain    History of cardiomyopathy    Migraine with aura and without status migrainosus, not intractable       Past Medical History:   Diagnosis Date    Abnormal brain MRI     Aortic regurgitation     Aortic valve mass     Arthritis     Arthritis     Back pain     Bowel obstruction (HCC)     CAD in native artery     Coronary arteriosclerosis     Coronary artery disease     Diabetes (HCC)     ED (erectile dysfunction)     Elevated PSA     GE reflux     Hand tingling     Headache     Headache, tension-type     Heart attack (HCC)     Heart murmur     High cholesterol     Hypertension     Hypertension     Left leg pain     Low back pain     Lumbar radiculopathy     Migraine     Mitral regurgitation     Mood disorder (HCC)     Myocardial infarct (HCC)     Osteoarthritis of wrist     Osteoporosis     Prediabetes     Prostate cancer (HCC)     Psychiatric disorder     Right shoulder pain     Sciatica     Spinal osteoarthritis     Sprain rotator cuff     Sprain, lumbar     Stress incontinence     TIA (transient ischemic attack)     Trigger ring finger of right hand     Urinary leakage     Weight disorder         Past Surgical History:   Procedure Laterality Date    APPENDECTOMY      CARDIAC CATHETERIZATION      CARDIAC SURGERY      CARDIOVASCULAR STRESS TEST      CHOLECYSTECTOMY      COLECTOMY      partial for bowel obstruction    HERNIA REPAIR      OTHER SURGICAL HISTORY      Stent placement    GA SURGICAL ARTHROSCOPY SHOULDER W/ROTATOR CUFF RPR Right 3/13/2024    Procedure: REPAIR ROTATOR CUFF  ARTHROSCOPIC, subacromial decompression;  Surgeon: Randy Blue MD;  Location: [facility];  Service: Orthopedics    PROSTATE SURGERY      Radical prostatectomy       Family History   Problem Relation Age of Onset    Breast cancer Mother     Hypertension Mother     Heart disease Father     Hypertension Father     Prostate cancer Father     Rheum arthritis Father     Stroke Father     Heart disease Sister     Hypertension Sister     Other Sister         Resp. disease       Social History     Occupational History    Not on file   Tobacco Use    Smoking status: Every Day     Current packs/day: 1.00     Average packs/day: 3.0 packs/day for 49.6 years (147.6 ttl pk-yrs)     Types: Cigarettes     Start date: 4/15/2024     Last attempt to quit: 12/20/2023     Passive exposure: Past    Smokeless tobacco: Never   Vaping Use    Vaping status: Every Day    Start date: 9/20/2024    Substances: Nicotine   Substance and Sexual Activity    Alcohol use: Not Currently    Drug use: Not Currently     Types: Marijuana, Cocaine    Sexual activity: Not Currently     Partners: Female     Birth control/protection: None       Current Outpatient Medications on File Prior to Visit   Medication Sig    acetaminophen (TYLENOL) 325 mg tablet Take 1 tablet (325 mg total) by mouth every 6 (six) hours as needed for mild pain (Patient taking differently: Take 325 mg by mouth every 6 (six) hours as needed for mild pain As needed)    amitriptyline (ELAVIL) 50 mg tablet Take 1 tablet (50 mg total) by mouth daily at bedtime    amLODIPine (NORVASC) 5 mg tablet  "Take 1 tablet by mouth once daily    atorvastatin (LIPITOR) 80 mg tablet Take 1 tablet by mouth once daily    DULoxetine (CYMBALTA) 60 mg delayed release capsule Take 1 capsule by mouth once daily    ezetimibe (ZETIA) 10 mg tablet Take 1 tablet by mouth once daily    metFORMIN (GLUCOPHAGE-XR) 500 mg 24 hr tablet Take 1 tablet by mouth once daily with breakfast    metoprolol succinate (TOPROL-XL) 25 mg 24 hr tablet Take 1 tablet by mouth once daily    omeprazole (PriLOSEC) 20 mg delayed release capsule Take 1 capsule (20 mg total) by mouth daily    pregabalin (LYRICA) 75 mg capsule Take 1 capsule (75 mg total) by mouth 3 (three) times a day    semaglutide, 0.25 or 0.5 mg/dose, (Ozempic, 0.25 or 0.5 MG/DOSE,) 2 mg/3 mL injection pen Inject 0.375 mL (0.25 mg total) under the skin every 7 days for 28 days, THEN 0.75 mL (0.5 mg total) every 7 days for 28 days. 0.25 mg under the skin every 7 days for 4 doses (28 days), THEN 0.5 mg under the skin every 7 days.    SUMAtriptan (IMITREX) 100 mg tablet Take 1 tablet (100 mg total) by mouth as needed for migraine May repeat in 2 hours. Limit of 3/week or 9/month    [DISCONTINUED] aspirin 81 mg chewable tablet Chew 1 tablet (81 mg total) daily (Patient not taking: Reported on 11/11/2024)     No current facility-administered medications on file prior to visit.       No Known Allergies      Physical Exam    /84 (BP Location: Left arm, Patient Position: Sitting, Cuff Size: Adult)   Pulse 86   Temp 97.6 °F (36.4 °C)   Resp 18   Ht 5' 7\" (1.702 m)   Wt 103 kg (226 lb)   SpO2 98%   BMI 35.40 kg/m²     Constitutional: normal, well developed, well nourished, alert, in no distress and non-toxic and no overt pain behavior. and obese  Eyes: anicteric  HEENT: grossly intact  Neck: supple, symmetric, trachea midline and no masses   Pulmonary:even and unlabored  Cardiovascular:No edema or pitting edema present  Skin:Normal without rashes or lesions and well " hydrated  Psychiatric:Mood and affect appropriate  Neurologic:Cranial Nerves II-XII grossly intact Sensation grossly intact; no clonus negative amezcua's. Reflexes 2+ and brisk. SLR negative bilaterally.   Musculoskeletal:normal gait. 5/5 strength bilaterally with AROM in lower extremities. Dififculty with normal heel toe and tip toe walking. Significant pain with lumbar facet loading bilaterally and with lateral spine rotation. ttp over lumbar paraspinal muscles. Negative emili's test, negative gaenslen's negative SIJ loading bilaterally. Ttp over right gtb, pain with internal and external rotation of right hip    Imaging    No pertinent imaging available to review at this time.

## 2024-11-14 ENCOUNTER — HOSPITAL ENCOUNTER (OUTPATIENT)
Dept: GASTROENTEROLOGY | Facility: HOSPITAL | Age: 54
Setting detail: OUTPATIENT SURGERY
End: 2024-11-14
Attending: ANESTHESIOLOGY
Payer: COMMERCIAL

## 2024-11-14 VITALS
TEMPERATURE: 98.1 F | OXYGEN SATURATION: 95 % | HEART RATE: 68 BPM | DIASTOLIC BLOOD PRESSURE: 80 MMHG | RESPIRATION RATE: 20 BRPM | SYSTOLIC BLOOD PRESSURE: 132 MMHG

## 2024-11-14 DIAGNOSIS — M16.11 PRIMARY OSTEOARTHRITIS OF RIGHT HIP: ICD-10-CM

## 2024-11-14 LAB — GLUCOSE SERPL-MCNC: 126 MG/DL (ref 65–140)

## 2024-11-14 PROCEDURE — 82948 REAGENT STRIP/BLOOD GLUCOSE: CPT

## 2024-11-14 PROCEDURE — 77002 NEEDLE LOCALIZATION BY XRAY: CPT

## 2024-11-14 PROCEDURE — 77002 NEEDLE LOCALIZATION BY XRAY: CPT | Performed by: ANESTHESIOLOGY

## 2024-11-14 PROCEDURE — 20610 DRAIN/INJ JOINT/BURSA W/O US: CPT | Performed by: ANESTHESIOLOGY

## 2024-11-14 RX ORDER — BUPIVACAINE HYDROCHLORIDE 2.5 MG/ML
INJECTION, SOLUTION EPIDURAL; INFILTRATION; INTRACAUDAL AS NEEDED
Status: COMPLETED | OUTPATIENT
Start: 2024-11-14 | End: 2024-11-14

## 2024-11-14 RX ORDER — METHYLPREDNISOLONE ACETATE 80 MG/ML
INJECTION, SUSPENSION INTRA-ARTICULAR; INTRALESIONAL; INTRAMUSCULAR; SOFT TISSUE AS NEEDED
Status: COMPLETED | OUTPATIENT
Start: 2024-11-14 | End: 2024-11-14

## 2024-11-14 RX ORDER — LIDOCAINE HYDROCHLORIDE 10 MG/ML
INJECTION, SOLUTION EPIDURAL; INFILTRATION; INTRACAUDAL; PERINEURAL AS NEEDED
Status: COMPLETED | OUTPATIENT
Start: 2024-11-14 | End: 2024-11-14

## 2024-11-14 RX ADMIN — LIDOCAINE HYDROCHLORIDE 5 ML: 10 INJECTION, SOLUTION EPIDURAL; INFILTRATION; INTRACAUDAL; PERINEURAL at 08:33

## 2024-11-14 RX ADMIN — BUPIVACAINE HYDROCHLORIDE 4 ML: 2.5 INJECTION, SOLUTION EPIDURAL; INFILTRATION; INTRACAUDAL; PERINEURAL at 08:34

## 2024-11-14 RX ADMIN — METHYLPREDNISOLONE ACETATE 80 MG: 80 INJECTION, SUSPENSION INTRA-ARTICULAR; INTRALESIONAL; INTRAMUSCULAR; SOFT TISSUE at 08:34

## 2024-11-14 RX ADMIN — IOHEXOL 1 ML: 240 INJECTION, SOLUTION INTRATHECAL; INTRAVASCULAR; INTRAVENOUS; ORAL at 08:33

## 2024-11-14 NOTE — DISCHARGE INSTR - AVS FIRST PAGE
YOUR 2 WEEK FOLLOW UP HAS BEEN SCHEDULED; IF YOU WISH TO CHANGE THE FOLLOW UP, PLEASE CALL THE [facility] AT [place]: [phone]      Steroid Joint Injection   WHAT YOU NEED TO KNOW:   A steroid joint injection is a procedure to inject steroid medicine into a joint. Steroid medicine decreases pain and inflammation. The injection may also contain an anesthetic (numbing medicine) to decrease pain. It may be done to treat conditions such as arthritis, gout, or carpal tunnel syndrome. The injections may be given in your knee, ankle, shoulder, elbow, or wrist. Injections may also be given in your hip, toe, thumb, or finger.  DISCHARGE INSTRUCTIONS:   Contact your healthcare provider if:   You have fever or chills.     You have redness or swelling at the injection site.     You have more pain than usual in your joint for more than 72 hours.     You have questions or concerns about your condition or care.    Medicines:   Pain medicine  may be given. Ask how to take this medicine safely.     Take your medicine as directed.  Contact your healthcare provider if you think your medicine is not helping or if you have side effects. Tell your provider if you are allergic to any medicine. Keep a list of the medicines, vitamins, and herbs you take. Include the amounts, and when and why you take them. Bring the list or the pill bottles to follow-up visits. Carry your medicine list with you in case of an emergency.    Self-care:   Leave the bandage on for 8 to 12 hours.  Care for your wound as directed.    Rest the area  as directed. You may need to decrease weight on certain joints, such as the knee, for a period of time. Ask when you can return to your daily activities.     Elevate  your limb where the steroid injection was given. Elevate the limb above the level of your heart as often as you can. This will help decrease swelling and pain. Prop your limb on pillows or blankets to keep it elevated comfortably.          Apply ice  on your joint for 15 to 20 minutes every hour or as directed. Use an ice pack, or put crushed ice in a plastic bag. Cover it with a towel. Ice helps prevent tissue damage and decreases swelling and pain.    © Copyright [employer] 2023 Information is for End User's use only and may not be sold, redistributed or otherwise used for commercial purposes.  The above information is an  only. It is not intended as medical advice for individual conditions or treatments. Talk to your doctor, nurse or pharmacist before following any medical regimen to see if it is safe and effective for you.

## 2024-11-14 NOTE — INTERVAL H&P NOTE
H&P reviewed. After examining the patient I find no changes in the patients condition since the H&P had been written.    Vitals:    11/14/24 0724   BP: 139/87   Pulse: 80   Resp: 20   Temp: (!) 97.3 °F (36.3 °C)   SpO2: 97%

## 2024-11-19 ENCOUNTER — HOSPITAL ENCOUNTER (EMERGENCY)
Facility: HOSPITAL | Age: 54
Discharge: HOME | End: 2024-11-19
Attending: EMERGENCY MEDICINE
Payer: COMMERCIAL

## 2024-11-19 ENCOUNTER — TELEPHONE (OUTPATIENT)
Dept: PAIN MEDICINE | Facility: CLINIC | Age: 54
End: 2024-11-19

## 2024-11-19 VITALS
DIASTOLIC BLOOD PRESSURE: 89 MMHG | WEIGHT: 226 LBS | BODY MASS INDEX: 35.4 KG/M2 | TEMPERATURE: 98.5 F | SYSTOLIC BLOOD PRESSURE: 160 MMHG | OXYGEN SATURATION: 100 % | RESPIRATION RATE: 17 BRPM | HEART RATE: 87 BPM

## 2024-11-19 DIAGNOSIS — G89.29 ACUTE EXACERBATION OF CHRONIC LOW BACK PAIN: Primary | ICD-10-CM

## 2024-11-19 DIAGNOSIS — M54.50 ACUTE EXACERBATION OF CHRONIC LOW BACK PAIN: Primary | ICD-10-CM

## 2024-11-19 PROCEDURE — 96375 TX/PRO/DX INJ NEW DRUG ADDON: CPT

## 2024-11-19 PROCEDURE — 99283 EMERGENCY DEPT VISIT LOW MDM: CPT

## 2024-11-19 PROCEDURE — 96374 THER/PROPH/DIAG INJ IV PUSH: CPT

## 2024-11-19 PROCEDURE — 99285 EMERGENCY DEPT VISIT HI MDM: CPT | Performed by: EMERGENCY MEDICINE

## 2024-11-19 RX ORDER — NAPROXEN 500 MG/1
500 TABLET ORAL 2 TIMES DAILY PRN
Qty: 30 TABLET | Refills: 0 | Status: SHIPPED | OUTPATIENT
Start: 2024-11-19

## 2024-11-19 RX ORDER — DEXAMETHASONE SODIUM PHOSPHATE 4 MG/ML
4 INJECTION, SOLUTION INTRA-ARTICULAR; INTRALESIONAL; INTRAMUSCULAR; INTRAVENOUS; SOFT TISSUE ONCE
Status: COMPLETED | OUTPATIENT
Start: 2024-11-19 | End: 2024-11-19

## 2024-11-19 RX ORDER — KETOROLAC TROMETHAMINE 30 MG/ML
15 INJECTION, SOLUTION INTRAMUSCULAR; INTRAVENOUS ONCE
Status: COMPLETED | OUTPATIENT
Start: 2024-11-19 | End: 2024-11-19

## 2024-11-19 RX ORDER — PREDNISONE 20 MG/1
20 TABLET ORAL DAILY
Qty: 5 TABLET | Refills: 0 | Status: SHIPPED | OUTPATIENT
Start: 2024-11-20 | End: 2024-11-25

## 2024-11-19 RX ORDER — DIAZEPAM 10 MG/2ML
2.5 INJECTION, SOLUTION INTRAMUSCULAR; INTRAVENOUS ONCE
Status: COMPLETED | OUTPATIENT
Start: 2024-11-19 | End: 2024-11-19

## 2024-11-19 RX ADMIN — KETOROLAC TROMETHAMINE 15 MG: 30 INJECTION, SOLUTION INTRAMUSCULAR; INTRAVENOUS at 19:15

## 2024-11-19 RX ADMIN — DIAZEPAM 2.5 MG: 5 INJECTION INTRAMUSCULAR; INTRAVENOUS at 19:15

## 2024-11-19 RX ADMIN — MORPHINE SULFATE 2 MG: 2 INJECTION, SOLUTION INTRAMUSCULAR; INTRAVENOUS at 19:15

## 2024-11-19 RX ADMIN — DEXAMETHASONE SODIUM PHOSPHATE 4 MG: 4 INJECTION INTRA-ARTICULAR; INTRALESIONAL; INTRAMUSCULAR; INTRAVENOUS; SOFT TISSUE at 19:14

## 2024-11-19 NOTE — TELEPHONE ENCOUNTER
Called and left voice message for patient about scheduling a sooner appointment in office with Dr. Maurer. Left office number for patient to call back on to schedule.     [phone]

## 2024-11-19 NOTE — ED PROVIDER NOTES
Time reflects when diagnosis was documented in both MDM as applicable and the Disposition within this note       Time User Action Codes Description Comment    11/19/2024  6:56 PM Billy Rahman Add [M54.50,  G89.29] Acute exacerbation of chronic low back pain           ED Disposition       ED Disposition   Discharge    Condition   Stable    Date/Time   Tue Nov 19, 2024  8:01 PM    Comment   Christopher Smalls Jr. discharge to home/self care.                   Assessment & Plan       Medical Decision Making  Patient presented to the emergency department and a MSE was performed. The patient was evaluated for complaint related to acute back pain. Patient is potentially at risk for, but not limited to, musculoskeletal pain, sciatica, degenerative disc disease, traumatic injury, occult fracture, discitis, osteomyelitis, spinal cord abscess  or other infectious etiology, spinal cord hemorrhage, conus medullaris, or cauda equina syndrome.  Several of these diagnoses have been evaluated and ruled out by history and physical.  As needed, patient will be further evaluated with laboratory and imaging studies.  Higher level diagnostics, such as CT imaging or ultrasound, may also be required.  Please see work-up portion of the note for further evaluation of patient's risk.  Socioeconomic factors were also considered as part of the decision-making process.  Unless otherwise stated in the chart or patient is admitted as elsewhere documented, any previously prescribed medications will be maintained.            Problems Addressed:  Acute exacerbation of chronic low back pain: chronic illness or injury with exacerbation, progression, or side effects of treatment    Risk  Prescription drug management.        ED Course as of 11/19/24 2002 Tue Nov 19, 2024 2000 Pain improved stable for discharge.  Patient to follow-up with his pain management specialist tomorrow.       Medications   diazepam (VALIUM) injection 2.5 mg (2.5 mg Intravenous  Given 11/19/24 1915)   ketorolac (TORADOL) injection 15 mg (15 mg Intravenous Given 11/19/24 1915)   dexamethasone (DECADRON) injection 4 mg (4 mg Intravenous Given 11/19/24 1914)   morphine injection 2 mg (2 mg Intravenous Given 11/19/24 1915)       ED Risk Strat Scores                           SBIRT 20yo+      Flowsheet Row Most Recent Value   Initial Alcohol Screen: US AUDIT-C     1. How often do you have a drink containing alcohol? 0 Filed at: 11/19/2024 1714   2. How many drinks containing alcohol do you have on a typical day you are drinking?  0 Filed at: 11/19/2024 1714   3a. Male UNDER 65: How often do you have five or more drinks on one occasion? 0 Filed at: 11/19/2024 1714   Audit-C Score 0 Filed at: 11/19/2024 1714   FRANCHESCA: How many times in the past year have you...    Used an illegal drug or used a prescription medication for non-medical reasons? Never Filed at: 11/19/2024 1714                            History of Present Illness       Chief Complaint   Patient presents with    Back Pain     Had hip injection Thursday, reports Saturday began with R lower back pain, has been unable to get out of bed. Taking lyrica.   Denies loss of b/b denies trauma         Past Medical History:   Diagnosis Date    Abnormal brain MRI     Aortic regurgitation     Aortic valve mass     Arthritis     Arthritis     Back pain     Bowel obstruction (HCC)     CAD in native artery     Coronary arteriosclerosis     Coronary artery disease     Diabetes (HCC)     ED (erectile dysfunction)     Elevated PSA     GE reflux     Hand tingling     Headache     Headache, tension-type     Heart attack (HCC)     Heart murmur     High cholesterol     Hypertension     Hypertension     Left leg pain     Low back pain     Lumbar radiculopathy     Migraine     Mitral regurgitation     Mood disorder (HCC)     Myocardial infarct (HCC)     Osteoarthritis of wrist     Osteoporosis     Prediabetes     Prostate cancer (HCC)     Psychiatric disorder      Right shoulder pain     Sciatica     Spinal osteoarthritis     Sprain rotator cuff     Sprain, lumbar     Stress incontinence     TIA (transient ischemic attack)     Trigger ring finger of right hand     Urinary leakage     Weight disorder       Past Surgical History:   Procedure Laterality Date    APPENDECTOMY      CARDIAC CATHETERIZATION      CARDIAC SURGERY      CARDIOVASCULAR STRESS TEST      CHOLECYSTECTOMY      COLECTOMY      partial for bowel obstruction    HERNIA REPAIR      [facility]  11/14/2024    OTHER SURGICAL HISTORY      Stent placement    [facility]ICAL ARTHROSCOPY SHOULDER W/ROTATOR CUFF RPR Right 3/13/2024    Procedure: REPAIR ROTATOR CUFF  ARTHROSCOPIC, subacromial decompression;  Surgeon: Randy Blue MD;  Location: [facility];  Service: Orthopedics    PROSTATE SURGERY      Radical prostatectomy      Family History   Problem Relation Age of Onset    Breast cancer Mother     Hypertension Mother     Heart disease Father     Hypertension Father     Prostate cancer Father     Rheum arthritis Father     Stroke Father     Heart disease Sister     Hypertension Sister     Other Sister         Resp. disease      Social History     Tobacco Use    Smoking status: Every Day     Current packs/day: 1.00     Average packs/day: 3.0 packs/day for 49.6 years (147.6 ttl pk-yrs)     Types: Cigarettes     Start date: 4/15/2024     Last attempt to quit: 12/20/2023     Passive exposure: Past    Smokeless tobacco: Never   Vaping Use    Vaping status: Every Day    Start date: 9/20/2024    Substances: Nicotine   Substance Use Topics    Alcohol use: Not Currently    Drug use: Not Currently     Types: Marijuana, Cocaine      E-Cigarette/Vaping    E-Cigarette Use Current Every Day User     Start Date 9/20/24       E-Cigarette/Vaping Substances    Nicotine Yes     THC No     CBD No     Flavoring No     Other No     Unknown No       I have reviewed and agree with the history as documented.     54-year-old  male followed by pain management with chronic pain related issues and lumbar radiculopathy as well as hip pain who received a steroid injection into his right hip on the 14th of this month.  Patient now complaining of pain in the right sided back pain.  Similar to previous episodes of back pain.  Pain radiates into his right leg.      Patient has no history of cancer.  Is on no immunosuppressive therapy, chronic steroid use.  No recent fevers or chills.  No abdominal pain.  No hematuria or other urinary complaints.  No anticoagulation therapy.  No anti-platelet therapy.  No recent trauma.  No use of intravenous drugs or medications.  No recent back surgery.  No bowel or bladder incontinence.        Back Pain  Associated symptoms: no abdominal pain, no dysuria, no fever and no weakness        Review of Systems   Constitutional:  Negative for fever.   Gastrointestinal:  Negative for abdominal pain, blood in stool, diarrhea, nausea and vomiting.   Genitourinary:  Negative for difficulty urinating, dysuria and flank pain.   Musculoskeletal:  Positive for back pain. Negative for gait problem and joint swelling.   Skin:  Negative for color change.   Neurological:  Negative for tremors, speech difficulty, weakness and light-headedness.           Objective       ED Triage Vitals [11/19/24 1713]   Temperature Pulse Blood Pressure Respirations SpO2 Patient Position - Orthostatic VS   98.5 °F (36.9 °C) 87 160/89 17 100 % Sitting      Temp Source Heart Rate Source BP Location FiO2 (%) Pain Score    Temporal Monitor Left arm -- 9      Vitals      Date and Time Temp Pulse SpO2 Resp BP Pain Score FACES Pain Rating User   11/19/24 1915 -- -- -- -- -- 9 -- AS   11/19/24 1713 98.5 °F (36.9 °C) 87 100 % 17 160/89 9 -- KK            Physical Exam  Vitals (BMI greater than 32) and nursing note reviewed.   Constitutional:       General: He is in acute distress.      Appearance: He is not ill-appearing or toxic-appearing.   HENT:       Head: Normocephalic and atraumatic.      Right Ear: External ear normal.      Left Ear: External ear normal.      Nose: Nose normal.      Mouth/Throat:      Mouth: Mucous membranes are moist.   Pulmonary:      Effort: Pulmonary effort is normal. No respiratory distress.      Breath sounds: No wheezing or rales.   Abdominal:      General: Abdomen is flat.   Musculoskeletal:         General: Tenderness present. No signs of injury.      Thoracic back: Normal.      Lumbar back: Tenderness present. No spasms or bony tenderness. Normal range of motion. No scoliosis.      Right lower leg: No edema.      Left lower leg: No edema.   Skin:     Coloration: Skin is not pale.   Neurological:      General: No focal deficit present.      Mental Status: He is alert.         Results Reviewed       None            No orders to display       Procedures    ED Medication and Procedure Management   Prior to Admission Medications   Prescriptions Last Dose Informant Patient Reported? Taking?   DULoxetine (CYMBALTA) 60 mg delayed release capsule  Self No No   Sig: Take 1 capsule by mouth once daily   SUMAtriptan (IMITREX) 100 mg tablet   No No   Sig: Take 1 tablet (100 mg total) by mouth as needed for migraine May repeat in 2 hours. Limit of 3/week or 9/month   acetaminophen (TYLENOL) 325 mg tablet  Self No No   Sig: Take 1 tablet (325 mg total) by mouth every 6 (six) hours as needed for mild pain   Patient taking differently: Take 325 mg by mouth every 6 (six) hours as needed for mild pain As needed   amLODIPine (NORVASC) 5 mg tablet  Self No No   Sig: Take 1 tablet by mouth once daily   amitriptyline (ELAVIL) 50 mg tablet   No No   Sig: Take 1 tablet (50 mg total) by mouth daily at bedtime   atorvastatin (LIPITOR) 80 mg tablet  Self No No   Sig: Take 1 tablet by mouth once daily   ezetimibe (ZETIA) 10 mg tablet  Self No No   Sig: Take 1 tablet by mouth once daily   metFORMIN (GLUCOPHAGE-XR) 500 mg 24 hr tablet   No No   Sig: Take 1 tablet  by mouth once daily with breakfast   metoprolol succinate (TOPROL-XL) 25 mg 24 hr tablet  Self No No   Sig: Take 1 tablet by mouth once daily   omeprazole (PriLOSEC) 20 mg delayed release capsule  Self No No   Sig: Take 1 capsule (20 mg total) by mouth daily   pregabalin (LYRICA) 75 mg capsule   No No   Sig: Take 1 capsule (75 mg total) by mouth 3 (three) times a day   semaglutide, 1 mg/dose, (Ozempic) 4 mg/3 mL injection pen   No No   Sig: Inject 0.75 mL (1 mg total) under the skin once a week      Facility-Administered Medications: None     Patient's Medications   Discharge Prescriptions    NAPROXEN (NAPROSYN) 500 MG TABLET    Take 1 tablet (500 mg total) by mouth 2 (two) times a day as needed for mild pain or moderate pain       Start Date: 11/19/2024End Date: --       Order Dose: 500 mg       Quantity: 30 tablet    Refills: 0    PREDNISONE 20 MG TABLET    Take 1 tablet (20 mg total) by mouth daily for 5 days Do not start before November 20, 2024.       Start Date: 11/20/2024End Date: 11/25/2024       Order Dose: 20 mg       Quantity: 5 tablet    Refills: 0     No discharge procedures on file.  ED SEPSIS DOCUMENTATION   Time reflects when diagnosis was documented in both MDM as applicable and the Disposition within this note       Time User Action Codes Description Comment    11/19/2024  6:56 PM Billy Rahman Add [M54.50,  G89.29] Acute exacerbation of chronic low back pain                  Billy Rahman,   11/19/24 2002

## 2024-11-20 NOTE — DISCHARGE INSTRUCTIONS
You will need to contact your pain management specialist tomorrow regarding any further use or prescriptions of pain medications.

## 2024-11-29 ENCOUNTER — TELEPHONE (OUTPATIENT)
Dept: PAIN MEDICINE | Facility: CLINIC | Age: 54
End: 2024-11-29

## 2024-12-03 ENCOUNTER — HOSPITAL ENCOUNTER (OUTPATIENT)
Dept: MRI IMAGING | Facility: HOSPITAL | Age: 54
Discharge: HOME | End: 2024-12-03
Attending: ANESTHESIOLOGY
Payer: COMMERCIAL

## 2024-12-03 DIAGNOSIS — I25.118 CORONARY ARTERY DISEASE OF NATIVE ARTERY OF NATIVE HEART WITH STABLE ANGINA PECTORIS (HCC): ICD-10-CM

## 2024-12-03 DIAGNOSIS — F32.A DEPRESSION: ICD-10-CM

## 2024-12-03 DIAGNOSIS — E78.2 MIXED HYPERLIPIDEMIA: ICD-10-CM

## 2024-12-03 DIAGNOSIS — K21.9 GERD (GASTROESOPHAGEAL REFLUX DISEASE): ICD-10-CM

## 2024-12-03 DIAGNOSIS — M47.26 OTHER SPONDYLOSIS WITH RADICULOPATHY, LUMBAR REGION: ICD-10-CM

## 2024-12-03 DIAGNOSIS — I10 ESSENTIAL HYPERTENSION: ICD-10-CM

## 2024-12-03 PROCEDURE — 72148 MRI LUMBAR SPINE W/O DYE: CPT

## 2024-12-04 ENCOUNTER — TELEPHONE (OUTPATIENT)
Age: 54
End: 2024-12-04

## 2024-12-04 RX ORDER — METOPROLOL SUCCINATE 25 MG/1
25 TABLET, EXTENDED RELEASE ORAL DAILY
Qty: 90 TABLET | Refills: 1 | Status: SHIPPED | OUTPATIENT
Start: 2024-12-04

## 2024-12-04 RX ORDER — DULOXETIN HYDROCHLORIDE 60 MG/1
60 CAPSULE, DELAYED RELEASE ORAL DAILY
Qty: 90 CAPSULE | Refills: 1 | Status: SHIPPED | OUTPATIENT
Start: 2024-12-04

## 2024-12-04 RX ORDER — ATORVASTATIN CALCIUM 80 MG/1
80 TABLET, FILM COATED ORAL DAILY
Qty: 90 TABLET | Refills: 0 | Status: SHIPPED | OUTPATIENT
Start: 2024-12-04

## 2024-12-04 RX ORDER — AMLODIPINE BESYLATE 5 MG/1
5 TABLET ORAL DAILY
Qty: 90 TABLET | Refills: 1 | Status: SHIPPED | OUTPATIENT
Start: 2024-12-04

## 2024-12-04 RX ORDER — EZETIMIBE 10 MG/1
10 TABLET ORAL DAILY
Qty: 90 TABLET | Refills: 1 | Status: SHIPPED | OUTPATIENT
Start: 2024-12-04

## 2024-12-04 NOTE — TELEPHONE ENCOUNTER
Doctor: Erasto  Caller Name: Jackson  RADHA#: [phone] opt 3    [facility] called to speak to clinical team regarding MRI ordered by provider. Significant findings.     *No call back necessary unless provider has questions.*

## 2024-12-05 ENCOUNTER — RESULTS FOLLOW-UP (OUTPATIENT)
Dept: PAIN MEDICINE | Facility: CLINIC | Age: 54
End: 2024-12-05

## 2024-12-05 NOTE — TELEPHONE ENCOUNTER
L4-L5: Disc bulge with superimposed central disc herniation that is inferiorly directed. Narrowing of the subarticular recess on both sides with associated mass effect. Moderate bilateral neuroforaminal narrowing. Moderate spinal canal stenosis.     L5-S1: Disc bulge with superimposed central disc herniation and annular fissure. Abutment of the right greater than left traversing bilateral S1 nerve roots. Moderate-marked left with mild-moderate right neuroforaminal narrowing. Moderate spinal canal   stenosis.       Above MRI findings discussed with patient via phone call; will plan for ILESI at L5-S1 to target radicular pain; informed consent to be obtained day of procedure.

## 2024-12-06 ENCOUNTER — PREP FOR PROCEDURE (OUTPATIENT)
Dept: PAIN MEDICINE | Facility: CLINIC | Age: 54
End: 2024-12-06

## 2024-12-06 DIAGNOSIS — M54.16 LUMBAR RADICULOPATHY: Primary | ICD-10-CM

## 2024-12-12 ENCOUNTER — HOSPITAL ENCOUNTER (OUTPATIENT)
Dept: GASTROENTEROLOGY | Facility: HOSPITAL | Age: 54
Setting detail: OUTPATIENT SURGERY
End: 2024-12-12
Attending: ANESTHESIOLOGY
Payer: COMMERCIAL

## 2024-12-12 VITALS
HEIGHT: 67 IN | WEIGHT: 226 LBS | BODY MASS INDEX: 35.47 KG/M2 | SYSTOLIC BLOOD PRESSURE: 133 MMHG | DIASTOLIC BLOOD PRESSURE: 84 MMHG | TEMPERATURE: 97.8 F | OXYGEN SATURATION: 96 % | RESPIRATION RATE: 20 BRPM | HEART RATE: 85 BPM

## 2024-12-12 DIAGNOSIS — M54.16 LUMBAR RADICULOPATHY: ICD-10-CM

## 2024-12-12 LAB — GLUCOSE SERPL-MCNC: 119 MG/DL (ref 65–140)

## 2024-12-12 PROCEDURE — 82948 REAGENT STRIP/BLOOD GLUCOSE: CPT

## 2024-12-12 PROCEDURE — 62323 NJX INTERLAMINAR LMBR/SAC: CPT | Performed by: ANESTHESIOLOGY

## 2024-12-12 RX ORDER — SODIUM CHLORIDE 9 MG/ML
INJECTION INTRAVENOUS AS NEEDED
Status: COMPLETED | OUTPATIENT
Start: 2024-12-12 | End: 2024-12-12

## 2024-12-12 RX ORDER — METHYLPREDNISOLONE ACETATE 80 MG/ML
INJECTION, SUSPENSION INTRA-ARTICULAR; INTRALESIONAL; INTRAMUSCULAR; SOFT TISSUE AS NEEDED
Status: COMPLETED | OUTPATIENT
Start: 2024-12-12 | End: 2024-12-12

## 2024-12-12 RX ORDER — LIDOCAINE HYDROCHLORIDE 10 MG/ML
INJECTION, SOLUTION EPIDURAL; INFILTRATION; INTRACAUDAL; PERINEURAL AS NEEDED
Status: COMPLETED | OUTPATIENT
Start: 2024-12-12 | End: 2024-12-12

## 2024-12-12 RX ADMIN — LIDOCAINE HYDROCHLORIDE 5 ML: 10 INJECTION, SOLUTION EPIDURAL; INFILTRATION; INTRACAUDAL; PERINEURAL at 11:05

## 2024-12-12 RX ADMIN — METHYLPREDNISOLONE ACETATE 80 MG: 80 INJECTION, SUSPENSION INTRA-ARTICULAR; INTRALESIONAL; INTRAMUSCULAR; SOFT TISSUE at 11:05

## 2024-12-12 RX ADMIN — IOHEXOL 1 ML: 240 INJECTION, SOLUTION INTRATHECAL; INTRAVASCULAR; INTRAVENOUS; ORAL at 11:05

## 2024-12-12 RX ADMIN — SODIUM CHLORIDE 3 ML: 9 INJECTION INTRAMUSCULAR; INTRAVENOUS; SUBCUTANEOUS at 11:05

## 2024-12-12 NOTE — DISCHARGE INSTR - AVS FIRST PAGE
YOUR 2 WEEK FOLLOW UP HAS BEEN SCHEDULED; IF YOU WISH TO CHANGE THE FOLLOW UP, PLEASE CALL THE [facility] AT [place]: [phone]    EPIDURAL STEROID INJECTION DISCHARGE INSTRUCTIONS  WHAT YOU NEED TO KNOW:   An epidural steroid injection (JASPAL) is a procedure to inject steroid medicine into the epidural space. The epidural space is between your spinal cord and vertebrae. Steroids reduce inflammation and fluid buildup in your spine that may be causing pain. You may be given pain medicine along with the steroids.        DISCHARGE INSTRUCTIONS:   Call your local emergency number (911 in the [place]) if:   You have a seizure.    You have trouble moving your legs.    Seek care immediately if:   Blood soaks through your bandage.    You have a fever or chills, severe back pain, and the procedure area is sensitive to the touch.    You cannot control when you urinate or have a bowel movement.    Call your doctor if:   You have weakness or numbness in your legs.    Your wound is red, swollen, or draining pus.    You have nausea or are vomiting.    Your face or neck is red and you feel warm.    You have more pain than you had before the procedure.    You have swelling in your hands or feet.    You have questions or concerns about your condition or care.    Care for your wound as directed:  You may remove the bandage before you go to bed the day of your procedure. You may take a shower, but do not take a bath for at least 24 hours.   Self-care:   Do not drive,  use machines, or do strenuous activity for 24 hours after your procedure or as directed.     Continue other treatments  as directed. Steroid injections alone will not control your pain. The injections are meant to be used with other treatments, such as physical therapy.    Follow up with your doctor as directed:  Write down your questions so you remember to ask them during your visits.           ACTIVITY  Do not drive or operate machinery today.  No strenuous  activity today - bending, lifting, etc.   You may resume normal activities starting tomorrow - start slowly and as tolerated.  You may shower today, but not tub baths or hot tubs.  You may have numbness for several hours from the local anesthetics. Please use caution and common sense, especially with weight-bearing activities.    CARE OF THE INJECTION SITE  If you have soreness or pain apply ice to the area today (20 minutes on and 20 minutes off).  Starting tomorrow, you may resume normal activities  Notify the [facility] if you have any of the following: redness, drainage, swelling or fever above 100°F.      MEDICATIONS  Continue to take all routine medications.  Our office may have instructed you to hold some medications. You may restart medications, including blood thinners.

## 2024-12-12 NOTE — H&P
Assessment  1. Lumbar radiculopathy  -     IR spine and pain procedure; Standing  -     IR spine and pain procedure    Right sided lumbar radicular pain in the L4 dermatomal distribution accompanied by pain limited weakness numbness and paresthesias.  Patient has not yet participated with PT. Chronic pain with decreased participation with IADLs over the past 3 months.  Has been taking OTC ibuprofen and tylenol in addition to gabapentin with modest benefit.  5/5 strength bilaterally, positive SLR right sided. Reflexes 2+.  Additionally there is positive facet loading,  right greater than left Denies any gait instability, saddle anesthesia. Xrays show spondylosis with disc degeneration of at least moderate in nature.  Risks, benefits alternatives epidural steroid injections thoroughly discussed with patient.  Handouts provided questions answered to patient's satisfaction.  Lifestyle modifications extensively discussed including diet, exercise and weight loss in addition to core strengthening.      Right sided hip pain described primarily by arthritic features.  Aching, nagging, indolent, stabbing, throbbing features in left hip radiating to left groin. Pain with internal and external rotation of right hip, ttp over rightGTB. Moderate right hip OA on xray. Risks, benefits and alternatives to left hip intra-articular joint injection thoroughly discussed with patient.  Handouts provided questions answered to patient satisfaction.    Plan  -ILESI L5-S1; f/u 2 weeks post procedure  -right hip intra-articular joint injection; f/u 2 weeks post procedure  -lyrica 75 mg t.i.d. Ordered for patient; to taper given decreased efficacy; counseled regarding sedative effects of taking this medication and provided up titration calendar.  Counseled not to take medication while driving or operating heavy machinery/using stairs  -has been participant with formal physical therapy for right-sided lumbar radiculopathy, hip pain;  Physician directed home exercise plan as per [employer] demonstrated and handouts provided that patient plans to participate with for 1 hour, twice a week for the next 6 weeks.     There are risks associated with opioid medications, including dependence, addiction and tolerance. The patient understands and agrees to use these medications only as prescribed. Potential side effects of the medications include, but are not limited to, constipation, drowsiness, addiction, impaired judgment and risk of fatal overdose if not taken as prescribed. The patient was warned against driving while taking sedation medications or operating heavy machinery. The patient voiced understanding. Sharing medications is a felony. At this point in time, the patient is showing no signs of addiction, abuse, diversion or suicidal ideation.     [place] Prescription Drug Monitoring Program report was reviewed and was appropriate      Complete risks and benefits including bleeding, infection, tissue reaction, nerve injury and allergic reaction were discussed. The approach was demonstrated using models and literature was provided. Verbal and written consent was obtained.     My impressions and treatment recommendations were discussed in detail with the patient who verbalized understanding and had no further questions.  Discharge instructions were provided. I personally saw and examined the patient and I agree with the above discussed plan of care.    No orders of the defined types were placed in this encounter.      History of Present Illness    Christopher Smalls Jr. is a 54 y.o. male with pmhx of HTN, XOL, migraines presenting with chronic lumbar radicular pain in the L4 dermatomal distributions. Debilitating pain limited weakness numbness and paresthesias accompany the pain. The patient rates the pain at a 8/10 accompanied by electric shock-like shooting features and crampy burning pain in the aforementioned dermatomal distributions.  The pain is  worse in the mornings as well as the end of the day; exertion such as walking for long periods of time seems to exacerbate the pain. He tries to maintain an active lifestyle and finds that the current degree of pain seems to compromises his efforts.  The pain significantly impacts independent activities of daily living and contributes to significant disability.  He has attempted PT this year with modest relief.  He has taken naproxen, tylenol as well as gabapentin with limited relief of the pain as well.  He has never tried epidural steroid injections in the past. He denies any bowel or bladder dysfunction/incontinence, saddle anesthesia or gait instability.    Right hip pain described primarily as arthritic in nature. 8/10 right hip pain that is worse in the mornings and worse at the end of the day.  The pain is characterized by achy, nagging, indolent, crampy, stabbing pain in right hip.  The patient describes that the pain is worse with standing for long periods of time on hard surfaces as well as with walking.  The patient is a very active individual and feels as though this pain compromises his participation with independent activities of daily living. The pain can be debilitating at times and contribute to significant disability, compromising overall activity and independent activities of daily living.     I have personally reviewed and/or updated the patient's past medical history, past surgical history, family history, social history, current medications, allergies, and vital signs today.     Review of Systems   Constitutional:  Positive for activity change.   HENT: Negative.     Eyes: Negative.    Respiratory: Negative.     Cardiovascular: Negative.    Gastrointestinal: Negative.    Endocrine: Negative.    Genitourinary: Negative.    Musculoskeletal:  Positive for arthralgias, back pain, gait problem and myalgias.   Skin: Negative.    Allergic/Immunologic: Negative.    Neurological:  Positive for weakness  and numbness.   Hematological: Negative.    Psychiatric/Behavioral: Negative.     All other systems reviewed and are negative.      Patient Active Problem List   Diagnosis    Abnormal brain MRI    Aortic regurgitation    Aortic valve sclerosis    Mitral regurgitation    Aortic valve mass    Coronary artery disease involving native coronary artery of native heart without angina pectoris    Essential hypertension    GE reflux    Mixed hyperlipidemia    History of tobacco use disorder    TIA (transient ischemic attack)    Lumbar radiculopathy    Prostate CA (HCC)    Type 2 diabetes mellitus without complication, without long-term current use of insulin (HCC)    Depression    Chronic pain of both shoulders    History of prostate cancer    Bilateral hip pain    History of cardiomyopathy    Migraine with aura and without status migrainosus, not intractable    Other spondylosis with radiculopathy, lumbar region    Primary osteoarthritis of right hip       Past Medical History:   Diagnosis Date    Abnormal brain MRI     Aortic regurgitation     Aortic valve mass     Arthritis     Arthritis     Back pain     Bowel obstruction (HCC)     CAD in native artery     Coronary arteriosclerosis     Coronary artery disease     Diabetes (HCC)     Diabetes mellitus (HCC)     ED (erectile dysfunction)     Elevated PSA     GE reflux     Hand tingling     Headache     Headache, tension-type     Heart attack (HCC)     Heart murmur     High cholesterol     Hypertension     Hypertension     Left leg pain     Low back pain     Lumbar radiculopathy     Migraine     Mitral regurgitation     Mood disorder (HCC)     Myocardial infarct (HCC)     Osteoarthritis of wrist     Osteoporosis     Prediabetes     Prostate cancer (HCC)     Psychiatric disorder     Right shoulder pain     Sciatica     Spinal osteoarthritis     Sprain rotator cuff     Sprain, lumbar     Stress incontinence     TIA (transient ischemic attack)     Trigger ring finger of right  hand     Urinary leakage     Weight disorder        Past Surgical History:   Procedure Laterality Date    APPENDECTOMY      CARDIAC CATHETERIZATION      CARDIAC SURGERY      CARDIOVASCULAR STRESS TEST      CHOLECYSTECTOMY      COLECTOMY      partial for bowel obstruction    HERNIA REPAIR      IR SPINE AND PAIN PROCEDURE  11/14/2024    OTHER SURGICAL HISTORY      Stent placement    MD SURGICAL ARTHROSCOPY SHOULDER W/ROTATOR CUFF RPR Right 3/13/2024    Procedure: REPAIR ROTATOR CUFF  ARTHROSCOPIC, subacromial decompression;  Surgeon: Randy Blue MD;  Location: [facility];  Service: Orthopedics    PROSTATE SURGERY      Radical prostatectomy       Family History   Problem Relation Age of Onset    Breast cancer Mother     Hypertension Mother     Heart disease Father     Hypertension Father     Prostate cancer Father     Rheum arthritis Father     Stroke Father     Heart disease Sister     Hypertension Sister     Other Sister         Resp. disease       Social History     Occupational History    Not on file   Tobacco Use    Smoking status: Every Day     Current packs/day: 0.25     Average packs/day: 3.0 packs/day for 49.7 years (147.6 ttl pk-yrs)     Types: Cigarettes     Start date: 4/15/2024     Last attempt to quit: 12/20/2023     Passive exposure: Past    Smokeless tobacco: Never   Vaping Use    Vaping status: Every Day    Start date: 9/20/2024    Substances: Nicotine   Substance and Sexual Activity    Alcohol use: Not Currently    Drug use: Not Currently     Types: Marijuana, Cocaine    Sexual activity: Not Currently     Partners: Female     Birth control/protection: None       Current Outpatient Medications on File Prior to Encounter   Medication Sig    amitriptyline (ELAVIL) 50 mg tablet Take 1 tablet (50 mg total) by mouth daily at bedtime    amLODIPine (NORVASC) 5 mg tablet Take 1 tablet by mouth once daily    atorvastatin (LIPITOR) 80 mg tablet Take 1 tablet by mouth once daily    DULoxetine (CYMBALTA) 60 mg  delayed release capsule Take 1 capsule by mouth once daily    ezetimibe (ZETIA) 10 mg tablet Take 1 tablet by mouth once daily    metFORMIN (GLUCOPHAGE-XR) 500 mg 24 hr tablet Take 1 tablet by mouth once daily with breakfast    metoprolol succinate (TOPROL-XL) 25 mg 24 hr tablet Take 1 tablet by mouth once daily    naproxen (NAPROSYN) 500 mg tablet Take 1 tablet (500 mg total) by mouth 2 (two) times a day as needed for mild pain or moderate pain    omeprazole (PriLOSEC) 20 mg delayed release capsule Take 1 capsule by mouth once daily    semaglutide, 1 mg/dose, (Ozempic) 4 mg/3 mL injection pen Inject 0.75 mL (1 mg total) under the skin once a week    acetaminophen (TYLENOL) 325 mg tablet Take 1 tablet (325 mg total) by mouth every 6 (six) hours as needed for mild pain (Patient taking differently: Take 325 mg by mouth every 6 (six) hours as needed for mild pain As needed)    pregabalin (LYRICA) 75 mg capsule Take 1 capsule (75 mg total) by mouth 3 (three) times a day    SUMAtriptan (IMITREX) 100 mg tablet Take 1 tablet (100 mg total) by mouth as needed for migraine May repeat in 2 hours. Limit of 3/week or 9/month     No current facility-administered medications on file prior to encounter.       No Known Allergies      Physical Exam    There were no vitals taken for this visit.    Constitutional: normal, well developed, well nourished, alert, in no distress and non-toxic and no overt pain behavior. and obese  Eyes: anicteric  HEENT: grossly intact  Neck: supple, symmetric, trachea midline and no masses   Pulmonary:even and unlabored  Cardiovascular:No edema or pitting edema present  Skin:Normal without rashes or lesions and well hydrated  Psychiatric:Mood and affect appropriate  Neurologic:Cranial Nerves II-XII grossly intact Sensation grossly intact; no clonus negative amezcua's. Reflexes 2+ and brisk. SLR negative bilaterally.   Musculoskeletal:normal gait. 5/5 strength bilaterally with AROM in lower extremities.  Dififculty with normal heel toe and tip toe walking. Significant pain with lumbar facet loading bilaterally and with lateral spine rotation. ttp over lumbar paraspinal muscles. Negative emili's test, negative gaenslen's negative SIJ loading bilaterally. Ttp over right gtb, pain with internal and external rotation of right hip    Imaging    No pertinent imaging available to review at this time.

## 2024-12-26 ENCOUNTER — TELEPHONE (OUTPATIENT)
Dept: PAIN MEDICINE | Facility: CLINIC | Age: 54
End: 2024-12-26

## 2024-12-26 DIAGNOSIS — M47.26 OTHER SPONDYLOSIS WITH RADICULOPATHY, LUMBAR REGION: Primary | ICD-10-CM

## 2024-12-26 NOTE — TELEPHONE ENCOUNTER
----- Message -----   From: Christopher Smalls Jr.   Sent: 12/24/2024   8:55 AM EST   To: [facility]   Subject: Medication Renewal Request                        Good morning, I just wanted to let Dr. Maurer that I am still experiencing a lot of pain lower back / right leg very painful is there anything else he can do or what is the next step to get major relief of my pain please let me know

## 2024-12-27 ENCOUNTER — PREP FOR PROCEDURE (OUTPATIENT)
Dept: PAIN MEDICINE | Facility: CLINIC | Age: 54
End: 2024-12-27

## 2024-12-27 DIAGNOSIS — M54.16 LUMBAR RADICULOPATHY: Primary | ICD-10-CM

## 2024-12-30 ENCOUNTER — OFFICE VISIT (OUTPATIENT)
Dept: PAIN MEDICINE | Facility: CLINIC | Age: 54
End: 2024-12-30
Payer: COMMERCIAL

## 2024-12-30 ENCOUNTER — TELEPHONE (OUTPATIENT)
Age: 54
End: 2024-12-30

## 2024-12-30 DIAGNOSIS — Z79.891 LONG TERM PRESCRIPTION OPIATE USE: ICD-10-CM

## 2024-12-30 DIAGNOSIS — M47.26 OTHER SPONDYLOSIS WITH RADICULOPATHY, LUMBAR REGION: Primary | ICD-10-CM

## 2024-12-30 DIAGNOSIS — G89.4 CHRONIC PAIN SYNDROME: ICD-10-CM

## 2024-12-30 PROBLEM — M54.16 LUMBAR RADICULOPATHY: Status: RESOLVED | Noted: 2024-01-15 | Resolved: 2024-12-30

## 2024-12-30 PROCEDURE — 99214 OFFICE O/P EST MOD 30 MIN: CPT | Performed by: ANESTHESIOLOGY

## 2024-12-30 RX ORDER — HYDROCODONE BITARTRATE AND ACETAMINOPHEN 5; 325 MG/1; MG/1
1 TABLET ORAL EVERY 8 HOURS PRN
Qty: 90 TABLET | Refills: 0 | Status: SHIPPED | OUTPATIENT
Start: 2024-12-30 | End: 2025-01-03

## 2024-12-30 NOTE — PATIENT INSTRUCTIONS
Patient Education     Core Strengthening Exercises on Back or on Hands and Knees   About this topic   Your core muscles are in your chest, back, buttock, and stomach area. They are your abdominal, back, and pelvis muscles. These muscles help keep your body stable when using your arms or legs. They also help with balance and posture. There are many exercises you can do to keep these muscles strong.  If you have back problems like a compression fracture or a ruptured disc, doing some of these exercises could make your problem worse. Some of these exercises may cause lower back pain.  General   Before starting with a program, ask your doctor if you are healthy enough to do these exercises. Your doctor may have you work with a , chiropractor, or physical therapist to make a safe exercise program to meet your needs.  Strengthening Exercises   Strengthening exercises keep your muscles firm and strong. Start by repeating each exercise 2 to 3 times. Work up to doing each exercise 10 times. Try to do the exercises 2 to 3 times each day. Hold each exercise for 3 to 5 seconds. Do all exercises slowly.  Hip lifts ? Lie on your back with your knees bent and feet flat on the floor. Tighten your stomach muscles and push your heels into the floor to lift your buttocks off the floor. Relax.  Pelvic tilts ? Lie on your back with your knees bent and feet flat on the floor. Tighten your stomach muscles and press your lower back down to the floor. Relax.  Straight leg raises lying down ? Lie on your back with one leg straight. Bend your other knee so the foot is flat on the bed. Keeping your leg straight, lift the leg up to the level of your other knee. Lower it back down. Repeat with the other leg.  Knee flex lying down ? Lie on your back with both knees bent and your feet flat on the floor. Tighten your belly muscles. Raise one leg up and back down as if you are marching in slow motion. Keep belly muscles tight while you move  your leg. Switch legs. To make this exercise harder, raise both arms straight up in the air. Tighten your belly muscles. When you raise one leg up, reach the opposite arm over your head. Switch, moving the opposite arm and leg until you have done 10 repetitions on each side.  Abdominal crunches ? Lie on your back with both knees bent. Keep your feet flat on the floor. Place your hands in one of these positions. Try starting with the first position since it is the easiest. As you get better, use the other positions to make it harder.  Crunches with arms at sides.  Crunches with arms across chest.  Crunches with arms behind head. Be careful not to interlock your fingers behind your neck or head while doing crunches. This may add tension to your neck and cause strain.  Look at the ceiling. Tighten your belly muscles and lift your shoulders and upper back off the floor. Breathe out while you are doing this. Lower your shoulders to the floor. Breathe in while you are doing this. Relax your belly muscles all the way before starting another crunch.  Arm and leg lifts on hands and knees ? Start on your hands and knees. With all of these exercises, keep your back as level as possible. If you are having trouble with this, you may want to put a small object on your back such as a book. If it falls off, you are not keeping your back level enough during the exercise.  Lift one arm up to shoulder level and hold. Lower it back down. Now, lift up the other arm and hold.  Lift one leg up and kick it straight out until it is in line with your back and hold. Lower it back down. Now, lift up the other leg and hold.  Lift one arm and the OPPOSITE leg up at the same time and hold. Lower them down. Now, repeat using the other arm and leg. This is a very hard exercise. It may take time to be able to do this.               What will the results be?   Stronger core  Better balance  More toned belly and back muscles  Easier to do daily  activities  Better sports performance  Less low back pain  Helpful tips   Stay active and work out to keep your muscles strong and flexible.  Keep a healthy weight to avoid putting too much stress on your spine. Eat a healthy diet to keep your muscles healthy.  Be sure you do not hold your breath when exercising. This can raise your blood pressure. If you tend to hold your breath, try counting out loud when exercising. If any exercise bothers you, stop right away.  Try walking or cycling at an easy pace for a few minutes to warm up your muscles. Do this again after exercising.  Exercise may be slightly uncomfortable, but you should not have sharp pains. If you do get sharp pains, stop what you are doing. If the sharp pains continue, call your doctor.  Last Reviewed Date   2021-03-18  Consumer Information Use and Disclaimer   This generalized information is a limited summary of diagnosis, treatment, and/or medication information. It is not meant to be comprehensive and should be used as a tool to help the user understand and/or assess potential diagnostic and treatment options. It does NOT include all information about conditions, treatments, medications, side effects, or risks that may apply to a specific patient. It is not intended to be medical advice or a substitute for the medical advice, diagnosis, or treatment of a health care provider based on the health care provider's examination and assessment of a patient’s specific and unique circumstances. Patients must speak with a health care provider for complete information about their health, medical questions, and treatment options, including any risks or benefits regarding use of medications. This information does not endorse any treatments or medications as safe, effective, or approved for treating a specific patient. [employer] and its affiliates disclaim any warranty or liability relating to this information or the use thereof. The use of this information  is governed by the Terms of Use, available at https://www.[employer]uwer.com/en/know/clinical-effectiveness-terms   Copyright   Copyright © 2024 [employer] and its affiliates and/or licensors. All rights reserved.

## 2024-12-30 NOTE — TELEPHONE ENCOUNTER
PA for              SUBMITTED to [employer]  HYDROcodone-acetaminophen (NORCO) 5-325   via    []CMM-KEY:   [x]Surescripts-Case ID #   []Availity-Auth ID # NDC #   []Faxed to plan   []Other website   []Phone call Case ID #     [x]PA sent as URGENT    All office notes, labs and other pertaining documents and studies sent. Clinical questions answered. Awaiting determination from insurance company.     Turnaround time for your insurance to make a decision on your Prior Authorization can take 7-21 business days.

## 2024-12-31 ENCOUNTER — TELEPHONE (OUTPATIENT)
Dept: OBGYN CLINIC | Facility: CLINIC | Age: 54
End: 2024-12-31

## 2024-12-31 NOTE — PROGRESS NOTES
Assessment  1. Other spondylosis with radiculopathy, lumbar region  -     HYDROcodone-acetaminophen (NORCO) 5-325 mg per tablet; Take 1 tablet by mouth every 8 (eight) hours as needed for pain Max Daily Amount: 3 tablets  2. Chronic pain syndrome  -     HYDROcodone-acetaminophen (NORCO) 5-325 mg per tablet; Take 1 tablet by mouth every 8 (eight) hours as needed for pain Max Daily Amount: 3 tablets  3. Long term prescription opiate use  -     HYDROcodone-acetaminophen (NORCO) 5-325 mg per tablet; Take 1 tablet by mouth every 8 (eight) hours as needed for pain Max Daily Amount: 3 tablets  -     [facility] All Prescribed Meds and Special Instructions  -     Amphetamines, Methamphetamines  -     Butalbital  -     Phenobarbital  -     Secobarbital  -     Alprazolam  -     Clonazepam  -     Diazepam  -     Lorazepam  -     Gabapentin  -     Pregabalin  -     Cocaine  -     Heroin  -     Buprenorphine  -     Levorphanol  -     Meperidine  -     Naltrexone  -     Fentanyl  -     Methadone  -     Oxycodone  -     Tapentadol  -     THC  -     Tramadol  -     Codeine, Hydrocodone, Hydropmorphone, Morphine  -     Bath Salts  -     Ethyl Glucuronide/Ethyl Sulfate  -     Kratom  -     Spice  -     Methylphenidate  -     Phentermine  -     Validity Oxidant  -     Validity Creatinine  -     Validity pH  -     Validity Specific  -     Xylazine Definitive Test    Right sided lumbar radicular pain in the L3 and L4 dermatomal distribution accompanied by pain limited weakness numbness and paresthesias.  Patient participated with PT without significant relief. Chronic pain with decreased participation with IADLs over the past 3 months.  Has been taking OTC ibuprofen and tylenol in addition to lyrica without significant benefit.  5/5 strength bilaterally, positive SLR right sided. Reflexes 2+.  Additionally there is positive facet loading,  right greater than left Denies any gait instability, saddle anesthesia. MRI shows at  L3-L4:  Disc bulge with a superimposed right foraminal disc herniation. There is narrowing of the subarticular recess on both sides. Marked facet arthropathy. There is moderate-marked compressive right neuroforaminal narrowing with mild-moderate left neuroforaminal narrowing. Mild spinal canal stenosis. At L4-L5: Disc bulge with superimposed central disc herniation that is inferiorly directed. Narrowing of the subarticular recess on both sides with associated mass effect. Moderate bilateral neuroforaminal narrowing. Moderate spinal canal stenosis Risks, benefits alternatives epidural steroid injections thoroughly discussed with patient.  Handouts provided questions answered to patient's satisfaction.  Lifestyle modifications extensively discussed including diet, exercise and weight loss in addition to core strengthening.      Right sided hip pain described primarily by arthritic features.  Aching, nagging, indolent, stabbing, throbbing features in left hip radiating to left groin. Pain with internal and external rotation of right hip, ttp over rightGTB. Moderate right hip OA on xray. Risks, benefits and alternatives to left hip intra-articular joint injection thoroughly discussed with patient.  Handouts provided questions answered to patient satisfaction.    Plan  -Right L3 and L4 TFESI; f/u 2 weeks post procedure  -norco 5-325mg [id number] prn pain rx, risks reviewed below; pain contract reviewed and signed by both parties, Uds obtained, will follow result;   -lyrica 75 mg t.i.d. Ordered for patient; to taper given decreased efficacy; counseled regarding sedative effects of taking this medication and provided up titration calendar.  Counseled not to take medication while driving or operating heavy machinery/using stairs  -has been participant with formal physical therapy for right-sided lumbar radiculopathy, hip pain; Physician directed home exercise plan as per [employer] demonstrated and handouts provided that patient plans to  participate with for 1 hour, twice a week for the next 6 weeks.     There are risks associated with opioid medications, including dependence, addiction and tolerance. The patient understands and agrees to use these medications only as prescribed. Potential side effects of the medications include, but are not limited to, constipation, drowsiness, addiction, impaired judgment and risk of fatal overdose if not taken as prescribed. The patient was warned against driving while taking sedation medications or operating heavy machinery. The patient voiced understanding. Sharing medications is a felony. At this point in time, the patient is showing no signs of addiction, abuse, diversion or suicidal ideation.     [place] Prescription Drug Monitoring Program report was reviewed and was appropriate      Complete risks and benefits including bleeding, infection, tissue reaction, nerve injury and allergic reaction were discussed. The approach was demonstrated using models and literature was provided. Verbal and written consent was obtained.     My impressions and treatment recommendations were discussed in detail with the patient who verbalized understanding and had no further questions.  Discharge instructions were provided. I personally saw and examined the patient and I agree with the above discussed plan of care.    New Medications Ordered This Visit   Medications    HYDROcodone-acetaminophen (NORCO) 5-325 mg per tablet     Sig: Take 1 tablet by mouth every 8 (eight) hours as needed for pain Max Daily Amount: 3 tablets     Dispense:  90 tablet     Refill:  0       History of Present Illness    Christopher Smalls  is a 54 y.o. male with pmhx of HTN, XOL, migraines presenting with subacute lumbar radicular pain in the right L3 and L4 dermatomal distributions. Debilitating pain limited weakness numbness and paresthesias accompany the pain. The patient rates the pain at a 8/10 accompanied by electric shock-like shooting  features and crampy burning pain in the aforementioned dermatomal distributions.  The pain is worse in the mornings as well as the end of the day; exertion such as walking for long periods of time seems to exacerbate the pain. He tries to maintain an active lifestyle and finds that the current degree of pain seems to compromises his efforts.  The pain significantly impacts independent activities of daily living and contributes to significant disability.  He has attempted PT this year with modest relief.  He has taken naproxen, tylenol as well as gabapentin with limited relief of the pain as well.  Prior right hip injection and ILESI unsuccessful in helping to control pain. He denies any bowel or bladder dysfunction/incontinence, saddle anesthesia or gait instability.    Right hip pain described primarily as arthritic in nature. 8/10 right hip pain that is worse in the mornings and worse at the end of the day.  The pain is characterized by achy, nagging, indolent, crampy, stabbing pain in right hip.  The patient describes that the pain is worse with standing for long periods of time on hard surfaces as well as with walking.  The patient is a very active individual and feels as though this pain compromises his participation with independent activities of daily living. The pain can be debilitating at times and contribute to significant disability, compromising overall activity and independent activities of daily living.     I have personally reviewed and/or updated the patient's past medical history, past surgical history, family history, social history, current medications, allergies, and vital signs today.     Review of Systems   Constitutional:  Positive for activity change.   HENT: Negative.     Eyes: Negative.    Respiratory: Negative.     Cardiovascular: Negative.    Gastrointestinal: Negative.    Endocrine: Negative.    Genitourinary: Negative.    Musculoskeletal:  Positive for arthralgias, back pain, gait  problem and myalgias.   Skin: Negative.    Allergic/Immunologic: Negative.    Neurological:  Positive for weakness and numbness.   Hematological: Negative.    Psychiatric/Behavioral: Negative.     All other systems reviewed and are negative.      Patient Active Problem List   Diagnosis    Abnormal brain MRI    Aortic regurgitation    Aortic valve sclerosis    Mitral regurgitation    Aortic valve mass    Coronary artery disease involving native coronary artery of native heart without angina pectoris    Essential hypertension    GE reflux    Mixed hyperlipidemia    History of tobacco use disorder    TIA (transient ischemic attack)    Prostate CA (HCC)    Type 2 diabetes mellitus without complication, without long-term current use of insulin (HCC)    Depression    Chronic pain of both shoulders    History of prostate cancer    Bilateral hip pain    History of cardiomyopathy    Migraine with aura and without status migrainosus, not intractable    Other spondylosis with radiculopathy, lumbar region    Primary osteoarthritis of right hip    Chronic pain syndrome    Long term prescription opiate use       Past Medical History:   Diagnosis Date    Abnormal brain MRI     Aortic regurgitation     Aortic valve mass     Arthritis     Arthritis     Back pain     Bowel obstruction (HCC)     CAD in native artery     Coronary arteriosclerosis     Coronary artery disease     Diabetes (HCC)     Diabetes mellitus (HCC)     ED (erectile dysfunction)     Elevated PSA     GE reflux     Hand tingling     Headache     Headache, tension-type     Heart attack (HCC)     Heart murmur     High cholesterol     Hypertension     Hypertension     Left leg pain     Low back pain     Lumbar radiculopathy     Migraine     Mitral regurgitation     Mood disorder (HCC)     Myocardial infarct (HCC)     Osteoarthritis of wrist     Osteoporosis     Prediabetes     Prostate cancer (HCC)     Psychiatric disorder     Right shoulder pain     Sciatica     Spinal  osteoarthritis     Sprain rotator cuff     Sprain, lumbar     Stress incontinence     TIA (transient ischemic attack)     Trigger ring finger of right hand     Urinary leakage     Weight disorder        Past Surgical History:   Procedure Laterality Date    APPENDECTOMY      CARDIAC CATHETERIZATION      CARDIAC SURGERY      CARDIOVASCULAR STRESS TEST      CHOLECYSTECTOMY      COLECTOMY      partial for bowel obstruction    HERNIA REPAIR      IR SPINE AND PAIN PROCEDURE  11/14/2024    IR SPINE AND PAIN PROCEDURE  12/12/2024    OTHER SURGICAL HISTORY      Stent placement    NM SURGICAL ARTHROSCOPY SHOULDER W/ROTATOR CUFF RPR Right 3/13/2024    Procedure: REPAIR ROTATOR CUFF  ARTHROSCOPIC, subacromial decompression;  Surgeon: Randy Blue MD;  Location: [facility];  Service: Orthopedics    PROSTATE SURGERY      Radical prostatectomy       Family History   Problem Relation Age of Onset    Breast cancer Mother     Hypertension Mother     Heart disease Father     Hypertension Father     Prostate cancer Father     Rheum arthritis Father     Stroke Father     Heart disease Sister     Hypertension Sister     Other Sister         Resp. disease       Social History     Occupational History    Not on file   Tobacco Use    Smoking status: Every Day     Current packs/day: 0.25     Average packs/day: 3.0 packs/day for 49.7 years (147.7 ttl pk-yrs)     Types: Cigarettes     Start date: 4/15/2024     Last attempt to quit: 12/20/2023     Passive exposure: Past    Smokeless tobacco: Never   Vaping Use    Vaping status: Every Day    Start date: 9/20/2024    Substances: Nicotine   Substance and Sexual Activity    Alcohol use: Not Currently    Drug use: Not Currently     Types: Marijuana, Cocaine    Sexual activity: Not Currently     Partners: Female     Birth control/protection: None       Current Outpatient Medications on File Prior to Visit   Medication Sig    acetaminophen (TYLENOL) 325 mg tablet Take 1 tablet (325 mg total) by  mouth every 6 (six) hours as needed for mild pain    amitriptyline (ELAVIL) 50 mg tablet Take 1 tablet (50 mg total) by mouth daily at bedtime    amLODIPine (NORVASC) 5 mg tablet Take 1 tablet by mouth once daily    atorvastatin (LIPITOR) 80 mg tablet Take 1 tablet by mouth once daily    DULoxetine (CYMBALTA) 60 mg delayed release capsule Take 1 capsule by mouth once daily    ezetimibe (ZETIA) 10 mg tablet Take 1 tablet by mouth once daily    metFORMIN (GLUCOPHAGE-XR) 500 mg 24 hr tablet Take 1 tablet by mouth once daily with breakfast    metoprolol succinate (TOPROL-XL) 25 mg 24 hr tablet Take 1 tablet by mouth once daily    naproxen (NAPROSYN) 500 mg tablet Take 1 tablet (500 mg total) by mouth 2 (two) times a day as needed for mild pain or moderate pain    omeprazole (PriLOSEC) 20 mg delayed release capsule Take 1 capsule by mouth once daily    semaglutide, 1 mg/dose, (Ozempic) 4 mg/3 mL injection pen Inject 0.75 mL (1 mg total) under the skin once a week    SUMAtriptan (IMITREX) 100 mg tablet Take 1 tablet (100 mg total) by mouth as needed for migraine May repeat in 2 hours. Limit of 3/week or 9/month    [DISCONTINUED] pregabalin (LYRICA) 75 mg capsule Take 1 capsule (75 mg total) by mouth 3 (three) times a day (Patient not taking: Reported on 12/30/2024)     No current facility-administered medications on file prior to visit.       No Known Allergies      Physical Exam    There were no vitals taken for this visit.    Constitutional: normal, well developed, well nourished, alert, in no distress and non-toxic and no overt pain behavior. and obese  Eyes: anicteric  HEENT: grossly intact  Neck: supple, symmetric, trachea midline and no masses   Pulmonary:even and unlabored  Cardiovascular:No edema or pitting edema present  Skin:Normal without rashes or lesions and well hydrated  Psychiatric:Mood and affect appropriate  Neurologic:Cranial Nerves II-XII grossly intact Sensation grossly intact; no clonus negative  amezcua's. Reflexes 2+ and brisk. SLR negative bilaterally.   Musculoskeletal:normal gait. 5/5 strength bilaterally with AROM in lower extremities. Dififculty with normal heel toe and tip toe walking. Significant pain with lumbar facet loading bilaterally and with lateral spine rotation, ttp over lumbar paraspinal muscles, right greater than left. Negative emili's test, negative gaenslen's negative SIJ loading bilaterally. Ttp over right gtb, pain with internal and external rotation of right hip    Imaging    MRI LUMBAR SPINE WITHOUT CONTRAST     INDICATION: M47.26: Other spondylosis with radiculopathy, lumbar region.     COMPARISON: Lumbar spine radiographs 2/26/2024.     TECHNIQUE:  Multiplanar, multisequence imaging of the lumbar spine was performed. .        IMAGE QUALITY:  Diagnostic     FINDINGS:     VERTEBRAL BODIES: Mild, grade 1 anterolisthesis of L3 on L4. No acute fracture.     SACRUM: No acute abnormality.     DISTAL CORD AND CONUS:  Normal size and signal within the distal cord and conus.     PARASPINAL SOFT TISSUES: No acute abnormality.     LOWER THORACIC DISC SPACES: Spondylotic changes without acute critical central canal stenosis.     LUMBAR DISC SPACES: Multilevel mild degenerative disc disease.     L1-L2: Disc bulge. Moderate-marked facet arthropathy. No significant neuroforaminal narrowing or spinal canal stenosis.     L2-L3: Disc bulge. Moderate-marked facet arthropathy, right greater than left. No significant neuroforaminal narrowing. No significant spinal canal stenosis.     L3-L4: Disc bulge with a superimposed right foraminal disc herniation. There is narrowing of the subarticular recess on both sides. Marked facet arthropathy.     There is moderate-marked compressive right neuroforaminal narrowing with mild-moderate left neuroforaminal narrowing. Mild spinal canal stenosis.     L4-L5: Disc bulge with superimposed central disc herniation that is inferiorly directed. Narrowing of the  subarticular recess on both sides with associated mass effect. Moderate bilateral neuroforaminal narrowing. Moderate spinal canal stenosis.     L5-S1: Disc bulge with superimposed central disc herniation and annular fissure. Abutment of the right greater than left traversing bilateral S1 nerve roots. Moderate-marked left with mild-moderate right neuroforaminal narrowing. Moderate spinal canal   stenosis.     IMPRESSION:     Multilevel advanced compressive spondylotic changes of the lumbar spine. See narrative above for full details.

## 2024-12-31 NOTE — H&P (VIEW-ONLY)
Assessment  1. Other spondylosis with radiculopathy, lumbar region  -     HYDROcodone-acetaminophen (NORCO) 5-325 mg per tablet; Take 1 tablet by mouth every 8 (eight) hours as needed for pain Max Daily Amount: 3 tablets  2. Chronic pain syndrome  -     HYDROcodone-acetaminophen (NORCO) 5-325 mg per tablet; Take 1 tablet by mouth every 8 (eight) hours as needed for pain Max Daily Amount: 3 tablets  3. Long term prescription opiate use  -     HYDROcodone-acetaminophen (NORCO) 5-325 mg per tablet; Take 1 tablet by mouth every 8 (eight) hours as needed for pain Max Daily Amount: 3 tablets  -     [facility] All Prescribed Meds and Special Instructions  -     Amphetamines, Methamphetamines  -     Butalbital  -     Phenobarbital  -     Secobarbital  -     Alprazolam  -     Clonazepam  -     Diazepam  -     Lorazepam  -     Gabapentin  -     Pregabalin  -     Cocaine  -     Heroin  -     Buprenorphine  -     Levorphanol  -     Meperidine  -     Naltrexone  -     Fentanyl  -     Methadone  -     Oxycodone  -     Tapentadol  -     THC  -     Tramadol  -     Codeine, Hydrocodone, Hydropmorphone, Morphine  -     Bath Salts  -     Ethyl Glucuronide/Ethyl Sulfate  -     Kratom  -     Spice  -     Methylphenidate  -     Phentermine  -     Validity Oxidant  -     Validity Creatinine  -     Validity pH  -     Validity Specific  -     Xylazine Definitive Test    Right sided lumbar radicular pain in the L3 and L4 dermatomal distribution accompanied by pain limited weakness numbness and paresthesias.  Patient participated with PT without significant relief. Chronic pain with decreased participation with IADLs over the past 3 months.  Has been taking OTC ibuprofen and tylenol in addition to lyrica without significant benefit.  5/5 strength bilaterally, positive SLR right sided. Reflexes 2+.  Additionally there is positive facet loading,  right greater than left Denies any gait instability, saddle anesthesia. MRI shows at  L3-L4:  Disc bulge with a superimposed right foraminal disc herniation. There is narrowing of the subarticular recess on both sides. Marked facet arthropathy. There is moderate-marked compressive right neuroforaminal narrowing with mild-moderate left neuroforaminal narrowing. Mild spinal canal stenosis. At L4-L5: Disc bulge with superimposed central disc herniation that is inferiorly directed. Narrowing of the subarticular recess on both sides with associated mass effect. Moderate bilateral neuroforaminal narrowing. Moderate spinal canal stenosis Risks, benefits alternatives epidural steroid injections thoroughly discussed with patient.  Handouts provided questions answered to patient's satisfaction.  Lifestyle modifications extensively discussed including diet, exercise and weight loss in addition to core strengthening.      Right sided hip pain described primarily by arthritic features.  Aching, nagging, indolent, stabbing, throbbing features in left hip radiating to left groin. Pain with internal and external rotation of right hip, ttp over rightGTB. Moderate right hip OA on xray. Risks, benefits and alternatives to left hip intra-articular joint injection thoroughly discussed with patient.  Handouts provided questions answered to patient satisfaction.    Plan  -Right L3 and L4 TFESI; f/u 2 weeks post procedure  -norco 5-325mg [id number] prn pain rx, risks reviewed below; pain contract reviewed and signed by both parties, Uds obtained, will follow result;   -lyrica 75 mg t.i.d. Ordered for patient; to taper given decreased efficacy; counseled regarding sedative effects of taking this medication and provided up titration calendar.  Counseled not to take medication while driving or operating heavy machinery/using stairs  -has been participant with formal physical therapy for right-sided lumbar radiculopathy, hip pain; Physician directed home exercise plan as per [facility] demonstrated and handouts provided that patient plans to  participate with for 1 hour, twice a week for the next 6 weeks.     There are risks associated with opioid medications, including dependence, addiction and tolerance. The patient understands and agrees to use these medications only as prescribed. Potential side effects of the medications include, but are not limited to, constipation, drowsiness, addiction, impaired judgment and risk of fatal overdose if not taken as prescribed. The patient was warned against driving while taking sedation medications or operating heavy machinery. The patient voiced understanding. Sharing medications is a felony. At this point in time, the patient is showing no signs of addiction, abuse, diversion or suicidal ideation.     [place] Prescription Drug Monitoring Program report was reviewed and was appropriate      Complete risks and benefits including bleeding, infection, tissue reaction, nerve injury and allergic reaction were discussed. The approach was demonstrated using models and literature was provided. Verbal and written consent was obtained.     My impressions and treatment recommendations were discussed in detail with the patient who verbalized understanding and had no further questions.  Discharge instructions were provided. I personally saw and examined the patient and I agree with the above discussed plan of care.    New Medications Ordered This Visit   Medications    HYDROcodone-acetaminophen (NORCO) 5-325 mg per tablet     Sig: Take 1 tablet by mouth every 8 (eight) hours as needed for pain Max Daily Amount: 3 tablets     Dispense:  90 tablet     Refill:  0       History of Present Illness    Christopher Smalls  is a 54 y.o. male with pmhx of HTN, XOL, migraines presenting with subacute lumbar radicular pain in the right L3 and L4 dermatomal distributions. Debilitating pain limited weakness numbness and paresthesias accompany the pain. The patient rates the pain at a 8/10 accompanied by electric shock-like shooting  features and crampy burning pain in the aforementioned dermatomal distributions.  The pain is worse in the mornings as well as the end of the day; exertion such as walking for long periods of time seems to exacerbate the pain. He tries to maintain an active lifestyle and finds that the current degree of pain seems to compromises his efforts.  The pain significantly impacts independent activities of daily living and contributes to significant disability.  He has attempted PT this year with modest relief.  He has taken naproxen, tylenol as well as gabapentin with limited relief of the pain as well.  Prior right hip injection and ILESI unsuccessful in helping to control pain. He denies any bowel or bladder dysfunction/incontinence, saddle anesthesia or gait instability.    Right hip pain described primarily as arthritic in nature. 8/10 right hip pain that is worse in the mornings and worse at the end of the day.  The pain is characterized by achy, nagging, indolent, crampy, stabbing pain in right hip.  The patient describes that the pain is worse with standing for long periods of time on hard surfaces as well as with walking.  The patient is a very active individual and feels as though this pain compromises his participation with independent activities of daily living. The pain can be debilitating at times and contribute to significant disability, compromising overall activity and independent activities of daily living.     I have personally reviewed and/or updated the patient's past medical history, past surgical history, family history, social history, current medications, allergies, and vital signs today.     Review of Systems   Constitutional:  Positive for activity change.   HENT: Negative.     Eyes: Negative.    Respiratory: Negative.     Cardiovascular: Negative.    Gastrointestinal: Negative.    Endocrine: Negative.    Genitourinary: Negative.    Musculoskeletal:  Positive for arthralgias, back pain, gait  problem and myalgias.   Skin: Negative.    Allergic/Immunologic: Negative.    Neurological:  Positive for weakness and numbness.   Hematological: Negative.    Psychiatric/Behavioral: Negative.     All other systems reviewed and are negative.      Patient Active Problem List   Diagnosis    Abnormal brain MRI    Aortic regurgitation    Aortic valve sclerosis    Mitral regurgitation    Aortic valve mass    Coronary artery disease involving native coronary artery of native heart without angina pectoris    Essential hypertension    GE reflux    Mixed hyperlipidemia    History of tobacco use disorder    TIA (transient ischemic attack)    Prostate CA (HCC)    Type 2 diabetes mellitus without complication, without long-term current use of insulin (HCC)    Depression    Chronic pain of both shoulders    History of prostate cancer    Bilateral hip pain    History of cardiomyopathy    Migraine with aura and without status migrainosus, not intractable    Other spondylosis with radiculopathy, lumbar region    Primary osteoarthritis of right hip    Chronic pain syndrome    Long term prescription opiate use       Past Medical History:   Diagnosis Date    Abnormal brain MRI     Aortic regurgitation     Aortic valve mass     Arthritis     Arthritis     Back pain     Bowel obstruction (HCC)     CAD in native artery     Coronary arteriosclerosis     Coronary artery disease     Diabetes (HCC)     Diabetes mellitus (HCC)     ED (erectile dysfunction)     Elevated PSA     GE reflux     Hand tingling     Headache     Headache, tension-type     Heart attack (HCC)     Heart murmur     High cholesterol     Hypertension     Hypertension     Left leg pain     Low back pain     Lumbar radiculopathy     Migraine     Mitral regurgitation     Mood disorder (HCC)     Myocardial infarct (HCC)     Osteoarthritis of wrist     Osteoporosis     Prediabetes     Prostate cancer (HCC)     Psychiatric disorder     Right shoulder pain     Sciatica     Spinal  osteoarthritis     Sprain rotator cuff     Sprain, lumbar     Stress incontinence     TIA (transient ischemic attack)     Trigger ring finger of right hand     Urinary leakage     Weight disorder        Past Surgical History:   Procedure Laterality Date    APPENDECTOMY      CARDIAC CATHETERIZATION      CARDIAC SURGERY      CARDIOVASCULAR STRESS TEST      CHOLECYSTECTOMY      COLECTOMY      partial for bowel obstruction    HERNIA REPAIR      IR SPINE AND PAIN PROCEDURE  11/14/2024    IR SPINE AND PAIN PROCEDURE  12/12/2024    OTHER SURGICAL HISTORY      Stent placement    [facility] SURGICAL ARTHROSCOPY SHOULDER W/ROTATOR CUFF RPR Right 3/13/2024    Procedure: REPAIR ROTATOR CUFF  ARTHROSCOPIC, subacromial decompression;  Surgeon: Randy Blue MD;  Location: [facility];  Service: Orthopedics    PROSTATE SURGERY      Radical prostatectomy       Family History   Problem Relation Age of Onset    Breast cancer Mother     Hypertension Mother     Heart disease Father     Hypertension Father     Prostate cancer Father     Rheum arthritis Father     Stroke Father     Heart disease Sister     Hypertension Sister     Other Sister         Resp. disease       Social History     Occupational History    Not on file   Tobacco Use    Smoking status: Every Day     Current packs/day: 0.25     Average packs/day: 3.0 packs/day for 49.7 years (147.7 ttl pk-yrs)     Types: Cigarettes     Start date: 4/15/2024     Last attempt to quit: 12/20/2023     Passive exposure: Past    Smokeless tobacco: Never   Vaping Use    Vaping status: Every Day    Start date: 9/20/2024    Substances: Nicotine   Substance and Sexual Activity    Alcohol use: Not Currently    Drug use: Not Currently     Types: Marijuana, Cocaine    Sexual activity: Not Currently     Partners: Female     Birth control/protection: None       Current Outpatient Medications on File Prior to Visit   Medication Sig    acetaminophen (TYLENOL) 325 mg tablet Take 1 tablet (325 mg total) by  mouth every 6 (six) hours as needed for mild pain    amitriptyline (ELAVIL) 50 mg tablet Take 1 tablet (50 mg total) by mouth daily at bedtime    amLODIPine (NORVASC) 5 mg tablet Take 1 tablet by mouth once daily    atorvastatin (LIPITOR) 80 mg tablet Take 1 tablet by mouth once daily    DULoxetine (CYMBALTA) 60 mg delayed release capsule Take 1 capsule by mouth once daily    ezetimibe (ZETIA) 10 mg tablet Take 1 tablet by mouth once daily    metFORMIN (GLUCOPHAGE-XR) 500 mg 24 hr tablet Take 1 tablet by mouth once daily with breakfast    metoprolol succinate (TOPROL-XL) 25 mg 24 hr tablet Take 1 tablet by mouth once daily    naproxen (NAPROSYN) 500 mg tablet Take 1 tablet (500 mg total) by mouth 2 (two) times a day as needed for mild pain or moderate pain    omeprazole (PriLOSEC) 20 mg delayed release capsule Take 1 capsule by mouth once daily    semaglutide, 1 mg/dose, (Ozempic) 4 mg/3 mL injection pen Inject 0.75 mL (1 mg total) under the skin once a week    SUMAtriptan (IMITREX) 100 mg tablet Take 1 tablet (100 mg total) by mouth as needed for migraine May repeat in 2 hours. Limit of 3/week or 9/month    [DISCONTINUED] pregabalin (LYRICA) 75 mg capsule Take 1 capsule (75 mg total) by mouth 3 (three) times a day (Patient not taking: Reported on 12/30/2024)     No current facility-administered medications on file prior to visit.       No Known Allergies      Physical Exam    There were no vitals taken for this visit.    Constitutional: normal, well developed, well nourished, alert, in no distress and non-toxic and no overt pain behavior. and obese  Eyes: anicteric  HEENT: grossly intact  Neck: supple, symmetric, trachea midline and no masses   Pulmonary:even and unlabored  Cardiovascular:No edema or pitting edema present  Skin:Normal without rashes or lesions and well hydrated  Psychiatric:Mood and affect appropriate  Neurologic:Cranial Nerves II-XII grossly intact Sensation grossly intact; no clonus negative  amezcua's. Reflexes 2+ and brisk. SLR negative bilaterally.   Musculoskeletal:normal gait. 5/5 strength bilaterally with AROM in lower extremities. Dififculty with normal heel toe and tip toe walking. Significant pain with lumbar facet loading bilaterally and with lateral spine rotation, ttp over lumbar paraspinal muscles, right greater than left. Negative emili's test, negative gaenslen's negative SIJ loading bilaterally. Ttp over right gtb, pain with internal and external rotation of right hip    Imaging    MRI LUMBAR SPINE WITHOUT CONTRAST     INDICATION: M47.26: Other spondylosis with radiculopathy, lumbar region.     COMPARISON: Lumbar spine radiographs 2/26/2024.     TECHNIQUE:  Multiplanar, multisequence imaging of the lumbar spine was performed. .        IMAGE QUALITY:  Diagnostic     FINDINGS:     VERTEBRAL BODIES: Mild, grade 1 anterolisthesis of L3 on L4. No acute fracture.     SACRUM: No acute abnormality.     DISTAL CORD AND CONUS:  Normal size and signal within the distal cord and conus.     PARASPINAL SOFT TISSUES: No acute abnormality.     LOWER THORACIC DISC SPACES: Spondylotic changes without acute critical central canal stenosis.     LUMBAR DISC SPACES: Multilevel mild degenerative disc disease.     L1-L2: Disc bulge. Moderate-marked facet arthropathy. No significant neuroforaminal narrowing or spinal canal stenosis.     L2-L3: Disc bulge. Moderate-marked facet arthropathy, right greater than left. No significant neuroforaminal narrowing. No significant spinal canal stenosis.     L3-L4: Disc bulge with a superimposed right foraminal disc herniation. There is narrowing of the subarticular recess on both sides. Marked facet arthropathy.     There is moderate-marked compressive right neuroforaminal narrowing with mild-moderate left neuroforaminal narrowing. Mild spinal canal stenosis.     L4-L5: Disc bulge with superimposed central disc herniation that is inferiorly directed. Narrowing of the  subarticular recess on both sides with associated mass effect. Moderate bilateral neuroforaminal narrowing. Moderate spinal canal stenosis.     L5-S1: Disc bulge with superimposed central disc herniation and annular fissure. Abutment of the right greater than left traversing bilateral S1 nerve roots. Moderate-marked left with mild-moderate right neuroforaminal narrowing. Moderate spinal canal   stenosis.     IMPRESSION:     Multilevel advanced compressive spondylotic changes of the lumbar spine. See narrative above for full details.

## 2024-12-31 NOTE — TELEPHONE ENCOUNTER
PA for hydro-acet  APPROVED     Date(s) approved    December 31, 2024 to March 31, 2025    Patient advised by          [x]MyChart Message  []Phone call   []LMOM  []L/M to call office as no active Communication consent on file  [x]Unable to leave detailed message as VM not approved on Communication consent       Pharmacy advised by    [x]Fax  []Phone call    Approval letter scanned into Media Yes

## 2024-12-31 NOTE — TELEPHONE ENCOUNTER
Pt procedure for 1/2 had to be canceled. I received a message from a Delores Nuñez on teams that insurance was denying Her message below.     the patient needs to be called as well since insurance is denying - there were a few IB messages sent to [facility] to see if Dr Maurer can do the p2p but we never recieved the response MRN [id number]    I did call Pt and make him aware that someone will be reaching out to him to discuss this further and reschedule his procedure if insurance allows.   Please follow up on this thank you.

## 2025-01-02 ENCOUNTER — PREP FOR PROCEDURE (OUTPATIENT)
Dept: PAIN MEDICINE | Facility: CLINIC | Age: 55
End: 2025-01-02

## 2025-01-02 DIAGNOSIS — M54.16 LUMBAR RADICULOPATHY: Primary | ICD-10-CM

## 2025-01-02 LAB
4OH-XYLAZINE UR QL CFM: NEGATIVE NG/ML
6MAM UR QL CFM: NEGATIVE NG/ML
7AMINOCLONAZEPAM UR QL CFM: NEGATIVE NG/ML
A-OH ALPRAZ UR QL CFM: NEGATIVE NG/ML
ACCEPTABLE CREAT UR QL: NORMAL MG/DL
ACCEPTIBLE SP GR UR QL: ABNORMAL
AMPHET UR QL CFM: NEGATIVE NG/ML
BUPRENORPHINE UR QL CFM: NEGATIVE NG/ML
BUTALBITAL UR QL CFM: NEGATIVE NG/ML
BZE UR QL CFM: NEGATIVE NG/ML
CODEINE UR QL CFM: NEGATIVE NG/ML
EDDP UR QL CFM: NEGATIVE NG/ML
ETHYL GLUCURONIDE UR QL CFM: NEGATIVE NG/ML
ETHYL SULFATE UR QL SCN: NEGATIVE NG/ML
EUTYLONE UR QL: NEGATIVE NG/ML
FENTANYL UR QL CFM: NEGATIVE NG/ML
GLIADIN IGG SER IA-ACNC: NEGATIVE NG/ML
HYDROCODONE UR QL CFM: ABNORMAL NG/ML
HYDROMORPHONE UR QL CFM: ABNORMAL NG/ML
LORAZEPAM UR QL CFM: NEGATIVE NG/ML
ME-PHENIDATE UR QL CFM: NEGATIVE NG/ML
MEPERIDINE UR QL CFM: NEGATIVE NG/ML
METHADONE UR QL CFM: NEGATIVE NG/ML
METHAMPHET UR QL CFM: NEGATIVE NG/ML
MORPHINE UR QL CFM: NEGATIVE NG/ML
NALTREXONE UR QL CFM: NEGATIVE NG/ML
NITRITE UR QL: NORMAL UG/ML
NORBUPRENORPHINE UR QL CFM: NEGATIVE NG/ML
NORDIAZEPAM UR QL CFM: NEGATIVE NG/ML
NORFENTANYL UR QL CFM: NEGATIVE NG/ML
NORHYDROCODONE UR QL CFM: ABNORMAL NG/ML
NORMEPERIDINE UR QL CFM: NEGATIVE NG/ML
NOROXYCODONE UR QL CFM: NEGATIVE NG/ML
OXAZEPAM UR QL CFM: NEGATIVE NG/ML
OXYCODONE UR QL CFM: NEGATIVE NG/ML
OXYMORPHONE UR QL CFM: NEGATIVE NG/ML
PARA-FLUOROFENTANYL QUANTIFICATION: NORMAL NG/ML
PHENOBARB UR QL CFM: NEGATIVE NG/ML
RESULT ALL_PRESCRIBED MEDS AND SPECIAL INSTRUCTIONS: NORMAL
SECOBARBITAL UR QL CFM: NEGATIVE NG/ML
SL AMB 5F-ADB-M7 METABOLITE QUANTIFICATION: NEGATIVE NG/ML
SL AMB 7-OH-MITRAGYNINE (KRATOM ALKALOID) QUANTIFICATION: NEGATIVE NG/ML
SL AMB AB-FUBINACA-M3 METABOLITE QUANTIFICATION: NEGATIVE NG/ML
SL AMB ACETYL FENTANYL QUANTIFICATION: NORMAL NG/ML
SL AMB ACETYL NORFENTANYL QUANTIFICATION: NORMAL NG/ML
SL AMB ACRYL FENTANYL QUANTIFICATION: NORMAL NG/ML
SL AMB CARFENTANIL QUANTIFICATION: NORMAL NG/ML
SL AMB CTHC (MARIJUANA METABOLITE) QUANTIFICATION: NEGATIVE NG/ML
SL AMB DEXTRORPHAN (DEXTROMETHORPHAN METABOLITE) QUANT: NEGATIVE NG/ML
SL AMB GABAPENTIN QUANTIFICATION: ABNORMAL NG/ML
SL AMB JWH018 METABOLITE QUANTIFICATION: NEGATIVE NG/ML
SL AMB JWH073 METABOLITE QUANTIFICATION: NEGATIVE NG/ML
SL AMB MDMB-FUBINACA-M1 METABOLITE QUANTIFICATION: NEGATIVE NG/ML
SL AMB METHYLONE QUANTIFICATION: NEGATIVE NG/ML
SL AMB N-DESMETHYL-TRAMADOL QUANTIFICATION: ABNORMAL NG/ML
SL AMB PHENTERMINE QUANTIFICATION: NEGATIVE NG/ML
SL AMB PREGABALIN QUANTIFICATION: NEGATIVE NG/ML
SL AMB RCS4 METABOLITE QUANTIFICATION: NEGATIVE NG/ML
SL AMB RITALINIC ACID QUANTIFICATION: NEGATIVE NG/ML
SMOOTH MUSCLE AB TITR SER IF: NEGATIVE NG/ML
SPECIMEN DRAWN SERPL: NEGATIVE NG/ML
SPECIMEN PH ACCEPTABLE UR: NORMAL
TAPENTADOL UR QL CFM: NEGATIVE NG/ML
TEMAZEPAM UR QL CFM: NEGATIVE NG/ML
TRAMADOL UR QL CFM: ABNORMAL NG/ML
URATE/CREAT 24H UR: ABNORMAL NG/ML
XYLAZINE UR QL CFM: NEGATIVE NG/ML

## 2025-01-03 DIAGNOSIS — G89.4 CHRONIC PAIN SYNDROME: ICD-10-CM

## 2025-01-03 DIAGNOSIS — Z79.891 LONG TERM PRESCRIPTION OPIATE USE: ICD-10-CM

## 2025-01-03 DIAGNOSIS — M47.26 OTHER SPONDYLOSIS WITH RADICULOPATHY, LUMBAR REGION: ICD-10-CM

## 2025-01-03 RX ORDER — HYDROCODONE BITARTRATE AND ACETAMINOPHEN 5; 325 MG/1; MG/1
1 TABLET ORAL EVERY 8 HOURS PRN
Qty: 90 TABLET | Refills: 0 | Status: SHIPPED | OUTPATIENT
Start: 2025-01-03

## 2025-01-03 NOTE — TELEPHONE ENCOUNTER
Patient called to check on the status of the prior authorization for then norco. Patient was made aware script was approved by the insurance and was advised to contact the pharmacy. No further questions at this time

## 2025-01-07 ENCOUNTER — HOSPITAL ENCOUNTER (OUTPATIENT)
Dept: INTERVENTIONAL RADIOLOGY/VASCULAR | Facility: HOSPITAL | Age: 55
Discharge: HOME | End: 2025-01-07
Attending: ANESTHESIOLOGY
Payer: COMMERCIAL

## 2025-01-07 VITALS
TEMPERATURE: 98.2 F | HEART RATE: 81 BPM | RESPIRATION RATE: 16 BRPM | BODY MASS INDEX: 35.47 KG/M2 | HEIGHT: 67 IN | SYSTOLIC BLOOD PRESSURE: 134 MMHG | DIASTOLIC BLOOD PRESSURE: 83 MMHG | OXYGEN SATURATION: 99 % | WEIGHT: 226 LBS

## 2025-01-07 DIAGNOSIS — M54.16 LUMBAR RADICULOPATHY: ICD-10-CM

## 2025-01-07 LAB — GLUCOSE SERPL-MCNC: 112 MG/DL (ref 65–140)

## 2025-01-07 PROCEDURE — 64484 NJX AA&/STRD TFRM EPI L/S EA: CPT | Performed by: ANESTHESIOLOGY

## 2025-01-07 PROCEDURE — 82948 REAGENT STRIP/BLOOD GLUCOSE: CPT

## 2025-01-07 PROCEDURE — 64483 NJX AA&/STRD TFRM EPI L/S 1: CPT | Performed by: ANESTHESIOLOGY

## 2025-01-07 RX ORDER — BETAMETHASONE SODIUM PHOSPHATE AND BETAMETHASONE ACETATE 3; 3 MG/ML; MG/ML
INJECTION, SUSPENSION INTRA-ARTICULAR; INTRALESIONAL; INTRAMUSCULAR; SOFT TISSUE AS NEEDED
Status: COMPLETED | OUTPATIENT
Start: 2025-01-07 | End: 2025-01-07

## 2025-01-07 RX ORDER — LIDOCAINE HYDROCHLORIDE 10 MG/ML
INJECTION, SOLUTION EPIDURAL; INFILTRATION; INTRACAUDAL; PERINEURAL AS NEEDED
Status: COMPLETED | OUTPATIENT
Start: 2025-01-07 | End: 2025-01-07

## 2025-01-07 RX ADMIN — IOHEXOL 2 ML: 240 INJECTION, SOLUTION INTRATHECAL; INTRAVASCULAR; INTRAVENOUS; ORAL at 13:29

## 2025-01-07 RX ADMIN — LIDOCAINE HYDROCHLORIDE 10 ML: 10 INJECTION, SOLUTION EPIDURAL; INFILTRATION; INTRACAUDAL; PERINEURAL at 13:28

## 2025-01-07 RX ADMIN — BETAMETHASONE SODIUM PHOSPHATE AND BETAMETHASONE ACETATE 30 MG: 3; 3 INJECTION, SUSPENSION INTRA-ARTICULAR; INTRALESIONAL; INTRAMUSCULAR at 13:29

## 2025-01-07 NOTE — INTERVAL H&P NOTE
H&P reviewed. After examining the patient I find no changes in the patients condition since the H&P had been written.    Vitals:    01/07/25 1313   BP: 103/78   Pulse: 82   Resp: 18   Temp: 98.1 °F (36.7 °C)   SpO2: 98%

## 2025-01-07 NOTE — DISCHARGE INSTR - AVS FIRST PAGE
YOUR 2 WEEK FOLLOW UP HAS BEEN SCHEDULED; IF YOU WISH TO CHANGE THE FOLLOW UP, PLEASE CALL THE [facility] AT [place]: [phone]    EPIDURAL STEROID INJECTION DISCHARGE INSTRUCTIONS  WHAT YOU NEED TO KNOW:   An epidural steroid injection (JASPAL) is a procedure to inject steroid medicine into the epidural space. The epidural space is between your spinal cord and vertebrae. Steroids reduce inflammation and fluid buildup in your spine that may be causing pain. You may be given pain medicine along with the steroids.        DISCHARGE INSTRUCTIONS:   Call your local emergency number (911 in the [place]) if:   You have a seizure.    You have trouble moving your legs.    Seek care immediately if:   Blood soaks through your bandage.    You have a fever or chills, severe back pain, and the procedure area is sensitive to the touch.    You cannot control when you urinate or have a bowel movement.    Call your doctor if:   You have weakness or numbness in your legs.    Your wound is red, swollen, or draining pus.    You have nausea or are vomiting.    Your face or neck is red and you feel warm.    You have more pain than you had before the procedure.    You have swelling in your hands or feet.    You have questions or concerns about your condition or care.    Care for your wound as directed:  You may remove the bandage before you go to bed the day of your procedure. You may take a shower, but do not take a bath for at least 24 hours.   Self-care:   Do not drive,  use machines, or do strenuous activity for 24 hours after your procedure or as directed.     Continue other treatments  as directed. Steroid injections alone will not control your pain. The injections are meant to be used with other treatments, such as physical therapy.    Follow up with your doctor as directed:  Write down your questions so you remember to ask them during your visits.           ACTIVITY  Do not drive or operate machinery today.  No strenuous  activity today - bending, lifting, etc.   You may resume normal activities starting tomorrow - start slowly and as tolerated.  You may shower today, but not tub baths or hot tubs.  You may have numbness for several hours from the local anesthetics. Please use caution and common sense, especially with weight-bearing activities.    CARE OF THE INJECTION SITE  If you have soreness or pain apply ice to the area today (20 minutes on and 20 minutes off).  Starting tomorrow, you may resume normal activities  Notify the [facility] if you have any of the following: redness, drainage, swelling or fever above 100°F.      MEDICATIONS  Continue to take all routine medications.  Our office may have instructed you to hold some medications. You may restart medications, including blood thinners.

## 2025-01-08 ENCOUNTER — TELEPHONE (OUTPATIENT)
Age: 55
End: 2025-01-08

## 2025-01-08 NOTE — TELEPHONE ENCOUNTER
Patient states he has had diarrhea since Friday. He states he is staying well hydrated and no changes in his appetite. New medication Norco for his pain management was started Friday. Patient asking if there is anything to help with this or anything he can do.

## 2025-01-09 NOTE — TELEPHONE ENCOUNTER
Spoke to patient in regards to his messages to provider. Provider feels he should stay well hydrated and get some electrolytes and if symptoms do not improve or get worse he needs to come in to the office or hospital for further evaluations

## 2025-01-15 ENCOUNTER — CONSULT (OUTPATIENT)
Dept: NEUROSURGERY | Facility: CLINIC | Age: 55
End: 2025-01-15
Payer: COMMERCIAL

## 2025-01-15 VITALS
HEART RATE: 88 BPM | SYSTOLIC BLOOD PRESSURE: 132 MMHG | DIASTOLIC BLOOD PRESSURE: 70 MMHG | OXYGEN SATURATION: 98 % | HEIGHT: 67 IN | BODY MASS INDEX: 35.47 KG/M2 | TEMPERATURE: 97.8 F | RESPIRATION RATE: 18 BRPM | WEIGHT: 226 LBS

## 2025-01-15 DIAGNOSIS — M47.26 OTHER SPONDYLOSIS WITH RADICULOPATHY, LUMBAR REGION: ICD-10-CM

## 2025-01-15 PROCEDURE — 99244 OFF/OP CNSLTJ NEW/EST MOD 40: CPT | Performed by: NEUROLOGICAL SURGERY

## 2025-01-15 RX ORDER — CEFAZOLIN SODIUM 2 G/50ML
2000 SOLUTION INTRAVENOUS ONCE
OUTPATIENT
Start: 2025-01-15 | End: 2025-01-15

## 2025-01-15 RX ORDER — CHLORHEXIDINE GLUCONATE ORAL RINSE 1.2 MG/ML
15 SOLUTION DENTAL ONCE
OUTPATIENT
Start: 2025-01-15 | End: 2025-01-15

## 2025-01-15 NOTE — PROGRESS NOTES
Name: Christopher Smalls Jr.      : 1970      MRN: [id number]  Encounter Provider: Craig Robert Goldberg, MD  Encounter Date: 1/15/2025   Encounter department: [facility] [place]  :  Assessment & Plan  Other spondylosis with radiculopathy, lumbar region    Right L3/4 HNP slightly lateral but should be accessible through a laminotomy approach.  This correlates with his right sided back, buttock, hip, and leg pain to the knee and not below.  Hip x-rays showed minimal arthritis.  He has not gotten durable benefit from conservative measures included multiple medications and JASPAL x 2.    He is a good candidate for a Right L3/4 hemilaminotomy, foraminotomy, diskectomy.  I discussed the nature of the procedure and reasonable expectations with the patient using a spine model and his own MRI.  The risks benefits and alternatives were explained to the patient, including but not limited to: general anesthesia, bleeding, infection, stroke, coma, death, CSF leak, nerve damage, brain damage, spinal cord damage, failure to improve, neurologic deficit including paralysis, need for reoperation.  All questions were answered.  No guarantees were given.  I do not think that the L4/5 or L5/S1 levels need to be addressed at surgery due to lack of concordant radiculopathy.  I do not think that a more extensive surgery such as an instrumented fusion is necessary for him at this time.  He understands        History of Present Illness   HPI  Back pain on the right side since December without inciting event  Right leg pain at the same time  No left leg problems  No change throughout the day  Worse standing  Moderate sitting  Walking with a limp  Down the right leg into knee, not below it anymore  In the beginning it was into the calf, not into the calf anymore  Valsalva leads to no changes  No incontinence, urgency  Medication: norco, neurontin without benefit, lyrica without benefit  PT - without durable  "benefit  Pain management, JASPAL first one without benefit,  JASPAL#2 with a little improvement      Review of Systems   Constitutional: Negative.    HENT: Negative.     Eyes: Negative.    Respiratory: Negative.     Cardiovascular: Negative.    Gastrointestinal: Negative.    Endocrine: Negative.    Genitourinary:  Positive for urgency.   Musculoskeletal:  Positive for back pain (LBP 4/10 radiates to right hip and down right leg, pain becomes more intense the longer he stands), gait problem (off balance, walks with a limp) and myalgias (right thigh feels like its being \"pulled\" tightness when going from sitting to standing).        JASPAL   12/12/24 @L5/S1-- no relief   1/7/25 right L3/L4-- little to no relief    Skin: Negative.    Allergic/Immunologic: Negative.    Neurological:  Positive for weakness (in the legs R>L). Negative for numbness.   Hematological: Negative.    Psychiatric/Behavioral:  Positive for sleep disturbance (pain can keep patient from sleeping as well as wake him up).     I have personally reviewed the MA's review of systems and made changes as necessary.    Past Medical History   Past Medical History:   Diagnosis Date   • Abnormal brain MRI    • Aortic regurgitation    • Aortic valve mass    • Arthritis    • Arthritis    • Back pain    • Bowel obstruction (HCC)    • CAD in native artery    • Coronary arteriosclerosis    • Coronary artery disease    • Diabetes (HCC)    • Diabetes mellitus (HCC)    • ED (erectile dysfunction)    • Elevated PSA    • GE reflux    • Hand tingling    • Headache    • Headache, tension-type    • Heart attack (HCC)    • Heart murmur    • High cholesterol    • Hypertension    • Hypertension    • Left leg pain    • Low back pain    • Lumbar radiculopathy    • Migraine    • Mitral regurgitation    • Mood disorder (HCC)    • Myocardial infarct (HCC)    • Osteoarthritis of wrist    • Osteoporosis    • Prediabetes    • Prostate cancer (HCC)    • Psychiatric disorder    • Right shoulder " pain    • Sciatica    • Spinal osteoarthritis    • Sprain rotator cuff    • Sprain, lumbar    • Stress incontinence    • TIA (transient ischemic attack)    • Trigger ring finger of right hand    • Urinary leakage    • Weight disorder      Past Surgical History:   Procedure Laterality Date   • APPENDECTOMY     • CARDIAC CATHETERIZATION     • CARDIAC SURGERY     • CARDIOVASCULAR STRESS TEST     • CHOLECYSTECTOMY     • COLECTOMY      partial for bowel obstruction   • HERNIA REPAIR     • IR SPINE AND PAIN PROCEDURE  11/14/2024   • IR SPINE AND PAIN PROCEDURE  12/12/2024   • IR SPINE AND PAIN PROCEDURE  1/7/2025   • OTHER SURGICAL HISTORY      Stent placement   • TX SURGICAL ARTHROSCOPY SHOULDER W/ROTATOR CUFF RPR Right 3/13/2024    Procedure: REPAIR ROTATOR CUFF  ARTHROSCOPIC, subacromial decompression;  Surgeon: Randy Blue MD;  Location: [facility];  Service: Orthopedics   • PROSTATE SURGERY      Radical prostatectomy     Family History   Problem Relation Age of Onset   • Breast cancer Mother    • Hypertension Mother    • Heart disease Father    • Hypertension Father    • Prostate cancer Father    • Rheum arthritis Father    • Stroke Father    • Heart disease Sister    • Hypertension Sister    • Other Sister         Resp. disease      reports that he has been smoking cigarettes. He started smoking about 9 months ago. He has a 147.7 pack-year smoking history. He has been exposed to tobacco smoke. He has never used smokeless tobacco. He reports that he does not currently use alcohol. He reports that he does not currently use drugs after having used the following drugs: Marijuana and Cocaine.  Current Outpatient Medications on File Prior to Visit   Medication Sig Dispense Refill   • acetaminophen (TYLENOL) 325 mg tablet Take 1 tablet (325 mg total) by mouth every 6 (six) hours as needed for mild pain 30 tablet 0   • amitriptyline (ELAVIL) 50 mg tablet Take 1 tablet (50 mg total) by mouth daily at bedtime 90 tablet 1    • amLODIPine (NORVASC) 5 mg tablet Take 1 tablet by mouth once daily 90 tablet 1   • atorvastatin (LIPITOR) 80 mg tablet Take 1 tablet by mouth once daily 90 tablet 0   • DULoxetine (CYMBALTA) 60 mg delayed release capsule Take 1 capsule by mouth once daily 90 capsule 1   • ezetimibe (ZETIA) 10 mg tablet Take 1 tablet by mouth once daily 90 tablet 1   • HYDROcodone-acetaminophen (NORCO) 5-325 mg per tablet Take 1 tablet by mouth every 8 (eight) hours as needed for pain (do not exceed 3 tablets in 24 hours) Max Daily Amount: 3 tablets 90 tablet 0   • metFORMIN (GLUCOPHAGE-XR) 500 mg 24 hr tablet Take 1 tablet by mouth once daily with breakfast 90 tablet 1   • metoprolol succinate (TOPROL-XL) 25 mg 24 hr tablet Take 1 tablet by mouth once daily 90 tablet 1   • naproxen (NAPROSYN) 500 mg tablet Take 1 tablet (500 mg total) by mouth 2 (two) times a day as needed for mild pain or moderate pain 30 tablet 0   • omeprazole (PriLOSEC) 20 mg delayed release capsule Take 1 capsule by mouth once daily 90 capsule 1   • semaglutide, 1 mg/dose, (Ozempic) 4 mg/3 mL injection pen Inject 0.75 mL (1 mg total) under the skin once a week 9 mL 0   • SUMAtriptan (IMITREX) 100 mg tablet Take 1 tablet (100 mg total) by mouth as needed for migraine May repeat in 2 hours. Limit of 3/week or 9/month 16 tablet 0     No current facility-administered medications on file prior to visit.   No Known Allergies   Social History     Tobacco Use   • Smoking status: Every Day     Current packs/day: 0.25     Average packs/day: 3.0 packs/day for 49.8 years (147.7 ttl pk-yrs)     Types: Cigarettes     Start date: 4/15/2024     Last attempt to quit: 12/20/2023     Passive exposure: Past   • Smokeless tobacco: Never   Vaping Use   • Vaping status: Every Day   • Start date: 9/20/2024   • Substances: Nicotine   Substance and Sexual Activity   • Alcohol use: Not Currently     Comment: occasionally   • Drug use: Not Currently     Types: Marijuana, Cocaine   •  "Sexual activity: Not Currently     Partners: Female     Birth control/protection: None        Objective   /70 (BP Location: Left arm, Patient Position: Sitting, Cuff Size: Standard)   Pulse 88   Temp 97.8 °F (36.6 °C) (Temporal)   Resp 18   Ht 5' 7\" (1.702 m)   Wt 103 kg (226 lb)   SpO2 98%   BMI 35.40 kg/m²     Physical Exam  Neurological Exam  Physical exam:     Well-developed well-nourished  Appears stated age of 54 y.o.  Awake alert oriented x3  Verbally interactive and appropriate  Adequate fund of knowledge  Cranial nerves II through VIII intact   Motor strength 5 out of 5 except right hip flex/ext 4/5  No pronator drift  Sensory intact to light touch except right thigh to knee not below decreased  No sensory changes on the dorsum of the spine.  without pain to palpation over the Cervical, Thoracic, and Lumbar  Right SI joint discomfort to palpation  Reflexes 1+and symmetric except right knee jerk absent  Toes: downgoing  Gait: limping on the right  Heel walk: limping on the right  Toe walk:: limping on the right  Tandem walk: limping on the right  Straight leg raising: with right sided hip and back pain  without right sided hip pain with hip rotation  without left sided hip pain with hip rotation  Memory intact  Mood: Euthymic affect: Full range  Language: Fluent in English  Good peripheral pulses in bilateral lower  extremities        MRI Images and Report of the Lumbar show Right L3/4 HNP impinging on the right L3 and L4 nerve root.  This correlates with his right sided back and hip pain that does not go past the knee on the right.  I think this can be accessed through a laminotomy.  He has varying degrees of central canal and lateral recess stenosis at L4/5 and L5/S1 but without concordant radiculopathy.  No left sided complaints or symptoms  Xray report of the right hip describes minimal arthritis making a hip etiology for his right sided compaints unlikely      "

## 2025-01-15 NOTE — ASSESSMENT & PLAN NOTE
Right L3/4 HNP slightly lateral but should be accessible through a laminotomy approach.  This correlates with his right sided back, buttock, hip, and leg pain to the knee and not below.  Hip x-rays showed minimal arthritis.  He has not gotten durable benefit from conservative measures included multiple medications and JASPAL x 2.    He is a good candidate for a Right L3/4 hemilaminotomy, foraminotomy, diskectomy.  I discussed the nature of the procedure and reasonable expectations with the patient using a spine model and his own MRI.  The risks benefits and alternatives were explained to the patient, including but not limited to: general anesthesia, bleeding, infection, stroke, coma, death, CSF leak, nerve damage, brain damage, spinal cord damage, failure to improve, neurologic deficit including paralysis, need for reoperation.  All questions were answered.  No guarantees were given.  I do not think that the L4/5 or L5/S1 levels need to be addressed at surgery due to lack of concordant radiculopathy.  I do not think that a more extensive surgery such as an instrumented fusion is necessary for him at this time.  He understands

## 2025-01-24 ENCOUNTER — TELEPHONE (OUTPATIENT)
Dept: ADMINISTRATIVE | Facility: OTHER | Age: 55
End: 2025-01-24

## 2025-01-24 NOTE — TELEPHONE ENCOUNTER
----- Message from Sandra BRIZUELA sent at 1/24/2025  9:56 AM EST -----  Regarding: Quality Update/ DM eye exam  01/24/25 9:56 AM    Hello, our patient No patient name on file. has had Diabetic Eye Exam completed/performed. Please assist in updating the patient chart by pulling the document from procedures Tab within Chart Review. The date of service is 6/20/24.     Thank you,  Sandra Galaviz MA  [facility]

## 2025-01-24 NOTE — TELEPHONE ENCOUNTER
Upon review of the In Basket request we Upon review of the request/inquiry, we are reaching out to you to ask for assistance. We have reviewed all Chart Review tabs, Care Everywhere (CE), completed a chart search and were unable to locate requested item(s). If you feel this was an oversight, please respond with with location and date of service of the document(s).    To respond with requested information, open this Encounter, navigate to the Routing section, add your response to the routing comments, add my name to the Recipient field, and select Send and Close Workspace.    Thank you  Roselyn Hebert MA      Any additional questions or concerns should be emailed to the Practice Liaisons via the appropriate education email address, please do not reply via In Basket.    Thank you  Roselyn Hebert MA   [facility]

## 2025-01-27 ENCOUNTER — OFFICE VISIT (OUTPATIENT)
Dept: FAMILY MEDICINE CLINIC | Facility: CLINIC | Age: 55
End: 2025-01-27
Payer: COMMERCIAL

## 2025-01-27 VITALS
BODY MASS INDEX: 33.7 KG/M2 | HEART RATE: 79 BPM | SYSTOLIC BLOOD PRESSURE: 128 MMHG | HEIGHT: 67 IN | WEIGHT: 214.73 LBS | DIASTOLIC BLOOD PRESSURE: 82 MMHG | OXYGEN SATURATION: 97 %

## 2025-01-27 DIAGNOSIS — K21.9 GASTROESOPHAGEAL REFLUX DISEASE, UNSPECIFIED WHETHER ESOPHAGITIS PRESENT: ICD-10-CM

## 2025-01-27 DIAGNOSIS — I25.10 CORONARY ARTERY DISEASE INVOLVING NATIVE CORONARY ARTERY OF NATIVE HEART WITHOUT ANGINA PECTORIS: ICD-10-CM

## 2025-01-27 DIAGNOSIS — I10 ESSENTIAL HYPERTENSION: ICD-10-CM

## 2025-01-27 DIAGNOSIS — F17.210 SMOKING GREATER THAN 20 PACK YEARS: ICD-10-CM

## 2025-01-27 DIAGNOSIS — F33.42 RECURRENT MAJOR DEPRESSIVE DISORDER, IN FULL REMISSION (HCC): ICD-10-CM

## 2025-01-27 DIAGNOSIS — C61 PROSTATE CA (HCC): ICD-10-CM

## 2025-01-27 DIAGNOSIS — Z87.891 HISTORY OF TOBACCO USE DISORDER: ICD-10-CM

## 2025-01-27 DIAGNOSIS — E78.2 MIXED HYPERLIPIDEMIA: ICD-10-CM

## 2025-01-27 DIAGNOSIS — G43.109 MIGRAINE WITH AURA AND WITHOUT STATUS MIGRAINOSUS, NOT INTRACTABLE: ICD-10-CM

## 2025-01-27 DIAGNOSIS — E11.9 TYPE 2 DIABETES MELLITUS WITHOUT COMPLICATION, WITHOUT LONG-TERM CURRENT USE OF INSULIN (HCC): Primary | ICD-10-CM

## 2025-01-27 PROCEDURE — 99214 OFFICE O/P EST MOD 30 MIN: CPT | Performed by: FAMILY MEDICINE

## 2025-01-27 NOTE — ASSESSMENT & PLAN NOTE
Quit smoking December 2023  Prior heavy smoker, 3 packs a day 40 years  Continue to monitor  Continue annual CT lung screenings in April    Orders:    CT lung screening program; Future

## 2025-01-27 NOTE — ASSESSMENT & PLAN NOTE
Echo 3/2024: EF 56%, mild LV wall thickness, mod AR, mild TR, mild MR  Now est with [facility]  Continue asa, atorvastatin, metoprolol, zetia, amlodipine  F/u 4 mo

## 2025-01-27 NOTE — ASSESSMENT & PLAN NOTE
Chronic  Current Blood Pressure: 128/82   Continue Metoprolol, amlodipine 5mg daily  F/u 4 mo

## 2025-01-27 NOTE — ASSESSMENT & PLAN NOTE
Lab Results   Component Value Date    HGBA1C 6.5 (H) 05/01/2024     Chronic, stable  Continue Metformin ER 500mg daily, Ozempic 1mg weekly   On statin  Needs DM eye exam - previously referred - IRIS exam completed today   DM foot exam UTD  Check labs and follow up 4 mo    Orders:    Albumin / creatinine urine ratio; Future    Lipid panel; Future    IRIS Diabetic eye exam

## 2025-01-27 NOTE — PROGRESS NOTES
Name: Christopher Smalls Jr.      : 1970      MRN: [id number]  Encounter Provider: Edwina Kaplan DO  Encounter Date: 2025   Encounter department: [facility]  :  Assessment & Plan  Type 2 diabetes mellitus without complication, without long-term current use of insulin (HCC)  Lab Results   Component Value Date    HGBA1C 6.5 (H) 2024     Chronic, stable  Continue Metformin ER 500mg daily, Ozempic 1mg weekly   On statin  Needs DM eye exam - previously referred - IRIS exam completed today   DM foot exam UTD  Check labs and follow up 4 mo    Orders:    Albumin / creatinine urine ratio; Future    Lipid panel; Future    IRIS Diabetic eye exam      Smoking greater than 20 pack years  Due for CT in April   Orders:    CT lung screening program; Future    Migraine with aura and without status migrainosus, not intractable  Chronic, follows with neurology  Continue Amitriptyline 50mg daily + prn sumatriptan           Essential hypertension  Chronic  Current Blood Pressure: 128/82   Continue Metoprolol, amlodipine 5mg daily  F/u 4 mo          Coronary artery disease involving native coronary artery of native heart without angina pectoris  Echo 3/2024: EF 56%, mild LV wall thickness, mod AR, mild TR, mild MR  Now est with [facility]  Continue asa, atorvastatin, metoprolol, zetia, amlodipine  F/u 4 mo           Gastroesophageal reflux disease, unspecified whether esophagitis present  Chronic, well controlled on Prilosec, continue omeprazole 20 mg daily  Continue to monitor           Prostate CA (HCC)  S/p resection  Cont mgmt per urology           History of tobacco use disorder  Quit smoking 2023  Prior heavy smoker, 3 packs a day 40 years  Continue to monitor  Continue annual CT lung screenings in April    Orders:    CT lung screening program; Future      Recurrent major depressive disorder, in full remission (HCC)  Stable, continue Cymbalta 60mg daily            Mixed hyperlipidemia  Lab Results   Component Value Date    CHOLESTEROL 116 05/01/2024    TRIG 137 05/01/2024    HDL 34 (L) 05/01/2024    LDLCALC 55 05/01/2024     Will continue on atorvastatin 80 mg daily, Zetia 10 mg daily.               Return in about 4 months (around 5/27/2025) for Annual physical, recheck chronic health conditions .      Depression Screening and Follow-up Plan: Patient was screened for depression during today's encounter. They screened negative with a PHQ-9 score of 2.    Tobacco Cessation Counseling: Tobacco cessation counseling was provided. The patient is sincerely urged to quit consumption of tobacco. He is not ready to quit tobacco.     Lung Cancer Screening Shared Decision Making: I discussed with him that he is a candidate for lung cancer CT screening.     The following Shared Decision-Making points were covered:  Benefits of screening were discussed, including the rates of reduction in death from lung cancer and other causes.  Harms of screening were reviewed, including false positive tests, radiation exposure levels, risks of invasive procedures, risks of complications of screening, and risk of overdiagnosis.  I counseled on the importance of adherence to annual lung cancer LDCT screening, impact of co-morbidities, and ability or willingness to undergo diagnosis and treatment.  I counseled on the importance of maintaining abstinence as a former smoker or was counseled on the importance of smoking cessation if a current smoker    Review of Eligibility Criteria: He meets all of the criteria for Lung Cancer Screening.   - He is 54 y.o.   - He has 20 pack year tobacco history and is a current smoker or has quit within the past 15 years  - He presents no signs or symptoms of lung cancer    After discussion, the patient decided to elect lung cancer screening.      History of Present Illness   Chief Complaint   Patient presents with    Follow-up     Also having diarrhea for 3 wks,  "getting better now       HPI  He reports about 3 weeks ago he was having diarrhea. This just started getting better yesterday. Denies vomiting. He was having abdominal cramps and watery diarrhea. He reports a lot of exhaustion and fatigue. Denies blood in the stool. He was staying hydrated with water and Gatorade. We discussed since it is now resolving we will monitor but if it returns I would like to know about it so we can do further work up. He agrees.     Review of Systems   Constitutional:  Negative for appetite change, chills and fever.   Eyes:  Negative for visual disturbance.   Respiratory:  Negative for shortness of breath.    Cardiovascular:  Negative for chest pain and leg swelling.   Neurological:  Negative for dizziness, light-headedness and headaches.       Objective   /82 (BP Location: Right arm, Patient Position: Sitting, Cuff Size: Standard)   Pulse 79   Ht 5' 7\" (1.702 m)   Wt 97.4 kg (214 lb 11.7 oz)   SpO2 97%   BMI 33.63 kg/m²      Physical Exam  Vitals reviewed.   Constitutional:       General: He is not in acute distress.     Appearance: He is obese. He is not ill-appearing.   HENT:      Head: Normocephalic and atraumatic.      Right Ear: External ear normal.      Left Ear: External ear normal.      Nose: Nose normal.   Eyes:      Extraocular Movements: Extraocular movements intact.      Conjunctiva/sclera: Conjunctivae normal.   Neck:      Vascular: No carotid bruit.   Cardiovascular:      Rate and Rhythm: Normal rate and regular rhythm.      Heart sounds: Normal heart sounds.   Pulmonary:      Effort: Pulmonary effort is normal.      Breath sounds: Normal breath sounds.   Abdominal:      General: Abdomen is flat. Bowel sounds are normal.      Palpations: Abdomen is soft.   Musculoskeletal:      Cervical back: Neck supple.      Right lower leg: No edema.      Left lower leg: No edema.   Neurological:      General: No focal deficit present.      Mental Status: He is alert. "   Psychiatric:         Mood and Affect: Mood normal.         Behavior: Behavior normal.

## 2025-01-27 NOTE — ASSESSMENT & PLAN NOTE
Chronic, well controlled on Prilosec, continue omeprazole 20 mg daily  Continue to monitor

## 2025-01-29 LAB
LEFT EYE DIABETIC RETINOPATHY: NORMAL
LEFT EYE IMAGE QUALITY: NORMAL
LEFT EYE MACULAR EDEMA: NORMAL
LEFT EYE OTHER RETINOPATHY: NORMAL
RIGHT EYE DIABETIC RETINOPATHY: NORMAL
RIGHT EYE IMAGE QUALITY: NORMAL
RIGHT EYE MACULAR EDEMA: NORMAL
RIGHT EYE OTHER RETINOPATHY: NORMAL
SEVERITY (EYE EXAM): NORMAL

## 2025-02-07 ENCOUNTER — TELEPHONE (OUTPATIENT)
Dept: OBGYN CLINIC | Facility: CLINIC | Age: 55
End: 2025-02-07

## 2025-02-07 DIAGNOSIS — G89.4 CHRONIC PAIN SYNDROME: ICD-10-CM

## 2025-02-07 DIAGNOSIS — M47.26 OTHER SPONDYLOSIS WITH RADICULOPATHY, LUMBAR REGION: ICD-10-CM

## 2025-02-07 DIAGNOSIS — Z79.891 LONG TERM PRESCRIPTION OPIATE USE: ICD-10-CM

## 2025-02-07 RX ORDER — HYDROCODONE BITARTRATE AND ACETAMINOPHEN 5; 325 MG/1; MG/1
1 TABLET ORAL EVERY 8 HOURS PRN
Qty: 90 TABLET | Refills: 0 | Status: SHIPPED | OUTPATIENT
Start: 2025-02-07

## 2025-02-07 NOTE — TELEPHONE ENCOUNTER
Can Dr Maurer send another Rx. For Forrest-acet because [facility] could only give me a week supply due to their policy . I'm all out and will need a refill please

## 2025-02-25 DIAGNOSIS — I25.118 CORONARY ARTERY DISEASE OF NATIVE ARTERY OF NATIVE HEART WITH STABLE ANGINA PECTORIS (HCC): ICD-10-CM

## 2025-02-25 DIAGNOSIS — E78.2 MIXED HYPERLIPIDEMIA: ICD-10-CM

## 2025-02-26 RX ORDER — ATORVASTATIN CALCIUM 80 MG/1
80 TABLET, FILM COATED ORAL DAILY
Qty: 90 TABLET | Refills: 0 | Status: SHIPPED | OUTPATIENT
Start: 2025-02-26

## 2025-03-01 ENCOUNTER — APPOINTMENT (EMERGENCY)
Dept: RADIOLOGY | Facility: HOSPITAL | Age: 55
End: 2025-03-01
Payer: COMMERCIAL

## 2025-03-01 ENCOUNTER — HOSPITAL ENCOUNTER (EMERGENCY)
Facility: HOSPITAL | Age: 55
Discharge: HOME | End: 2025-03-02
Attending: EMERGENCY MEDICINE
Payer: COMMERCIAL

## 2025-03-01 DIAGNOSIS — R07.9 CHEST PAIN, UNSPECIFIED TYPE: Primary | ICD-10-CM

## 2025-03-01 LAB
ALBUMIN SERPL BCG-MCNC: 4.2 G/DL (ref 3.5–5)
ALP SERPL-CCNC: 103 U/L (ref 34–104)
ALT SERPL W P-5'-P-CCNC: 21 U/L (ref 7–52)
ANION GAP SERPL CALCULATED.3IONS-SCNC: 7 MMOL/L (ref 4–13)
AST SERPL W P-5'-P-CCNC: 21 U/L (ref 13–39)
BASOPHILS # BLD AUTO: 0.04 THOUSANDS/ÂΜL (ref 0–0.1)
BASOPHILS NFR BLD AUTO: 1 % (ref 0–1)
BILIRUB SERPL-MCNC: 0.4 MG/DL (ref 0.2–1)
BUN SERPL-MCNC: 9 MG/DL (ref 5–25)
CALCIUM SERPL-MCNC: 9.2 MG/DL (ref 8.4–10.2)
CARDIAC TROPONIN I PNL SERPL HS: 3 NG/L (ref ?–50)
CHLORIDE SERPL-SCNC: 104 MMOL/L (ref 96–108)
CO2 SERPL-SCNC: 27 MMOL/L (ref 21–32)
CREAT SERPL-MCNC: 0.87 MG/DL (ref 0.6–1.3)
EOSINOPHIL # BLD AUTO: 0.1 THOUSAND/ÂΜL (ref 0–0.61)
EOSINOPHIL NFR BLD AUTO: 2 % (ref 0–6)
ERYTHROCYTE [DISTWIDTH] IN BLOOD BY AUTOMATED COUNT: 14.5 % (ref 11.6–15.1)
GFR SERPL CREATININE-BSD FRML MDRD: 97 ML/MIN/1.73SQ M
GLUCOSE SERPL-MCNC: 135 MG/DL (ref 65–140)
HCT VFR BLD AUTO: 38 % (ref 36.5–49.3)
HGB BLD-MCNC: 12.8 G/DL (ref 12–17)
IMM GRANULOCYTES # BLD AUTO: 0.01 THOUSAND/UL (ref 0–0.2)
IMM GRANULOCYTES NFR BLD AUTO: 0 % (ref 0–2)
LYMPHOCYTES # BLD AUTO: 3.07 THOUSANDS/ÂΜL (ref 0.6–4.47)
LYMPHOCYTES NFR BLD AUTO: 54 % (ref 14–44)
MCH RBC QN AUTO: 30.3 PG (ref 26.8–34.3)
MCHC RBC AUTO-ENTMCNC: 33.7 G/DL (ref 31.4–37.4)
MCV RBC AUTO: 90 FL (ref 82–98)
MONOCYTES # BLD AUTO: 0.43 THOUSAND/ÂΜL (ref 0.17–1.22)
MONOCYTES NFR BLD AUTO: 8 % (ref 4–12)
NEUTROPHILS # BLD AUTO: 1.94 THOUSANDS/ÂΜL (ref 1.85–7.62)
NEUTS SEG NFR BLD AUTO: 35 % (ref 43–75)
NRBC BLD AUTO-RTO: 0 /100 WBCS
PLATELET # BLD AUTO: 238 THOUSANDS/UL (ref 149–390)
PMV BLD AUTO: 10 FL (ref 8.9–12.7)
POTASSIUM SERPL-SCNC: 3.8 MMOL/L (ref 3.5–5.3)
PROT SERPL-MCNC: 7.6 G/DL (ref 6.4–8.4)
RBC # BLD AUTO: 4.22 MILLION/UL (ref 3.88–5.62)
SODIUM SERPL-SCNC: 138 MMOL/L (ref 135–147)
WBC # BLD AUTO: 5.59 THOUSAND/UL (ref 4.31–10.16)

## 2025-03-01 PROCEDURE — 93005 ELECTROCARDIOGRAM TRACING: CPT

## 2025-03-01 PROCEDURE — 84484 ASSAY OF TROPONIN QUANT: CPT | Performed by: EMERGENCY MEDICINE

## 2025-03-01 PROCEDURE — 96374 THER/PROPH/DIAG INJ IV PUSH: CPT

## 2025-03-01 PROCEDURE — 36415 COLL VENOUS BLD VENIPUNCTURE: CPT | Performed by: EMERGENCY MEDICINE

## 2025-03-01 PROCEDURE — 85025 COMPLETE CBC W/AUTO DIFF WBC: CPT | Performed by: EMERGENCY MEDICINE

## 2025-03-01 PROCEDURE — 99285 EMERGENCY DEPT VISIT HI MDM: CPT

## 2025-03-01 PROCEDURE — 99285 EMERGENCY DEPT VISIT HI MDM: CPT | Performed by: EMERGENCY MEDICINE

## 2025-03-01 PROCEDURE — 80053 COMPREHEN METABOLIC PANEL: CPT | Performed by: EMERGENCY MEDICINE

## 2025-03-01 PROCEDURE — 71045 X-RAY EXAM CHEST 1 VIEW: CPT

## 2025-03-01 RX ORDER — KETOROLAC TROMETHAMINE 30 MG/ML
30 INJECTION, SOLUTION INTRAMUSCULAR; INTRAVENOUS ONCE
Status: COMPLETED | OUTPATIENT
Start: 2025-03-01 | End: 2025-03-01

## 2025-03-01 RX ADMIN — KETOROLAC TROMETHAMINE 30 MG: 30 INJECTION, SOLUTION INTRAMUSCULAR; INTRAVENOUS at 22:55

## 2025-03-01 NOTE — Clinical Note
Christopher Smalls was seen and treated in our emergency department on 3/1/2025.                Diagnosis:     Christopher  may return to work on return date.    He may return on this date: 03/03/2025         If you have any questions or concerns, please don't hesitate to call.      Bashir Angulo MD    ______________________________           _______________          _______________  Hospital Representative                              Date                                Time

## 2025-03-02 VITALS
HEART RATE: 80 BPM | BODY MASS INDEX: 33.8 KG/M2 | SYSTOLIC BLOOD PRESSURE: 129 MMHG | WEIGHT: 215.83 LBS | TEMPERATURE: 98.1 F | DIASTOLIC BLOOD PRESSURE: 78 MMHG | OXYGEN SATURATION: 95 % | RESPIRATION RATE: 19 BRPM

## 2025-03-02 NOTE — ED PROVIDER NOTES
Time reflects when diagnosis was documented in both MDM as applicable and the Disposition within this note       Time User Action Codes Description Comment    3/1/2025 11:31 PM Kev Bashir Add [R07.9] Chest pain, unspecified type           ED Disposition       ED Disposition   Discharge    Condition   Stable    Date/Time   Sat Mar 1, 2025 11:31 PM    Comment   Christopher Smalls Jr. discharge to home/self care.                   Assessment & Plan       Medical Decision Making  2236: Patient appears well, vital signs reviewed.  Patient with atypical left-sided chest pain.  Placed on the monitor.  EKG nonischemic.  Plan to complete basic labs including cardiac enzymes.  No thromboembolism stigmata.  Patient denies shortness of breath.  Check chest x-ray.    2355: Chest x-ray and labs reviewed.  The patient has remained stable throughout ED course.  Stable for discharge.  Close follow-up with PCP.    Amount and/or Complexity of Data Reviewed  Labs: ordered.  Radiology: ordered.     Details: Chest x-ray--no acute pathology  ECG/medicine tests: ordered and independent interpretation performed.     Details: Normal sinus rhythm 86 bpm, minimal voltage criteria for LVH, Q waves V2, no acute ischemia.    Risk  Prescription drug management.             Medications   ketorolac (TORADOL) injection 30 mg (30 mg Intravenous Given 3/1/25 2255)       ED Risk Strat Scores   HEART Risk Score      Flowsheet Row Most Recent Value   Heart Score Risk Calculator    History 0 Filed at: 03/01/2025 2331   ECG 0 Filed at: 03/01/2025 2331   Age 1 Filed at: 03/01/2025 2331   Risk Factors 1 Filed at: 03/01/2025 2331   Troponin 0 Filed at: 03/01/2025 2331   HEART Score 2 Filed at: 03/01/2025 2331          HEART Risk Score      Flowsheet Row Most Recent Value   Heart Score Risk Calculator    History 0 Filed at: 03/01/2025 2331   ECG 0 Filed at: 03/01/2025 2331   Age 1 Filed at: 03/01/2025 2331   Risk Factors 1 Filed at: 03/01/2025 2331   Troponin 0  Filed at: 03/01/2025 2331   HEART Score 2 Filed at: 03/01/2025 2331                              SBIRT 20yo+      Flowsheet Row Most Recent Value   Initial Alcohol Screen: US AUDIT-C     1. How often do you have a drink containing alcohol? 0 Filed at: 03/01/2025 2239   2. How many drinks containing alcohol do you have on a typical day you are drinking?  0 Filed at: 03/01/2025 2239   3a. Male UNDER 65: How often do you have five or more drinks on one occasion? 0 Filed at: 03/01/2025 2239   Audit-C Score 0 Filed at: 03/01/2025 2239   FRANCHESCA: How many times in the past year have you...    Used an illegal drug or used a prescription medication for non-medical reasons? Never Filed at: 03/01/2025 2239                            History of Present Illness       Chief Complaint   Patient presents with    Chest Pain     Chest pain started 3 weeks ago, pt denies sob, diaphoresis, dizziness, nausea. States his hands and legs seem to be more swollen.        Past Medical History:   Diagnosis Date    Abnormal brain MRI     Aortic regurgitation     Aortic valve mass     Arthritis     Arthritis     Back pain     Bowel obstruction (HCC)     CAD in native artery     Coronary arteriosclerosis     Coronary artery disease     Diabetes (HCC)     Diabetes mellitus (HCC)     ED (erectile dysfunction)     Elevated PSA     GE reflux     Hand tingling     Headache     Headache, tension-type     Heart attack (HCC)     Heart murmur     High cholesterol     Hypertension     Hypertension     Left leg pain     Low back pain     Lumbar radiculopathy     Migraine     Mitral regurgitation     Mood disorder (HCC)     Myocardial infarct (HCC)     Osteoarthritis of wrist     Osteoporosis     Prediabetes     Prostate cancer (HCC)     Psychiatric disorder     Right shoulder pain     Sciatica     Spinal osteoarthritis     Sprain rotator cuff     Sprain, lumbar     Stress incontinence     TIA (transient ischemic attack)     Trigger ring finger of right hand      Urinary leakage     Weight disorder       Past Surgical History:   Procedure Laterality Date    APPENDECTOMY      CARDIAC CATHETERIZATION      CARDIAC SURGERY      CARDIOVASCULAR STRESS TEST      CHOLECYSTECTOMY      COLECTOMY      partial for bowel obstruction    HERNIA REPAIR      IR SPINE AND PAIN PROCEDURE  11/14/2024    IR SPINE AND PAIN PROCEDURE  12/12/2024    IR SPINE AND PAIN PROCEDURE  1/7/2025    OTHER SURGICAL HISTORY      Stent placement    [facility] SURGICAL ARTHROSCOPY SHOULDER W/ROTATOR CUFF RPR Right 3/13/2024    Procedure: REPAIR ROTATOR CUFF  ARTHROSCOPIC, subacromial decompression;  Surgeon: Randy Blue MD;  Location: [facility];  Service: Orthopedics    PROSTATE SURGERY      Radical prostatectomy      Family History   Problem Relation Age of Onset    Breast cancer Mother     Hypertension Mother     Heart disease Father     Hypertension Father     Prostate cancer Father     Rheum arthritis Father     Stroke Father     Heart disease Sister     Hypertension Sister     Other Sister         Resp. disease      Social History     Tobacco Use    Smoking status: Every Day     Current packs/day: 0.25     Average packs/day: 3.0 packs/day for 49.9 years (147.7 ttl pk-yrs)     Types: Cigarettes     Start date: 4/15/2024     Last attempt to quit: 12/20/2023     Passive exposure: Past    Smokeless tobacco: Never   Vaping Use    Vaping status: Every Day    Start date: 9/20/2024    Substances: Nicotine   Substance Use Topics    Alcohol use: Not Currently     Comment: occasionally    Drug use: Not Currently     Types: Marijuana, Cocaine      E-Cigarette/Vaping    E-Cigarette Use Current Every Day User     Start Date 9/20/24       E-Cigarette/Vaping Substances    Nicotine Yes     THC No     CBD No     Flavoring No     Other No     Unknown No       I have reviewed and agree with the history as documented.       History provided by:  Medical records and patient  Chest Pain  Pain location:  L chest  Pain quality:  aching and dull    Pain radiates to:  L shoulder and L arm  Pain radiates to the back: no    Pain severity:  Moderate  Onset quality:  Gradual  Duration:  3 weeks  Timing:  Constant  Progression:  Unchanged  Chronicity:  New  Context comment:  History of CAD, stents 2019, changed pillows 3 weeks ago, ever since he has had some left-sided chest and left shoulder pain, worse with certain movements  Relieved by:  Nothing  Worsened by:  Movement and certain positions (Laying on the left side)  Associated symptoms: no abdominal pain, no altered mental status, no anorexia, no anxiety, no back pain, no claudication, no cough, no diaphoresis, no dizziness, no dysphagia, no fatigue, no fever, no headache, no heartburn, no lower extremity edema, no nausea, no near-syncope, no numbness, no orthopnea, no palpitations, no PND, no shortness of breath, no syncope, not vomiting and no weakness    Risk factors: coronary artery disease    Risk factors comment:  Known to Dr. Farooq cardiology, he has not informed him of these discomforts      Review of Systems   Constitutional:  Negative for appetite change, chills, diaphoresis, fatigue and fever.   HENT:  Negative for ear pain, rhinorrhea, sore throat and trouble swallowing.    Eyes:  Negative for pain, discharge and visual disturbance.   Respiratory:  Negative for apnea, cough, choking, chest tightness, shortness of breath, wheezing and stridor.    Cardiovascular:  Positive for chest pain. Negative for palpitations, orthopnea, claudication, leg swelling, syncope, PND and near-syncope.   Gastrointestinal:  Negative for abdominal distention, abdominal pain, anal bleeding, anorexia, blood in stool, constipation, diarrhea, heartburn, nausea, rectal pain and vomiting.   Endocrine: Negative for polydipsia, polyphagia and polyuria.   Genitourinary:  Negative for difficulty urinating, dysuria, hematuria and testicular pain.   Musculoskeletal:  Negative for arthralgias and back pain.   Skin:   Negative for color change and rash.   Allergic/Immunologic: Negative for immunocompromised state.   Neurological:  Negative for dizziness, seizures, syncope, weakness, numbness and headaches.   Hematological:  Negative for adenopathy.   Psychiatric/Behavioral:  Negative for confusion and dysphoric mood.    All other systems reviewed and are negative.          Objective       ED Triage Vitals [03/01/25 2239]   Temperature Pulse Blood Pressure Respirations SpO2 Patient Position - Orthostatic VS   98.1 °F (36.7 °C) 88 140/79 22 94 % --      Temp Source Heart Rate Source BP Location FiO2 (%) Pain Score    Temporal Monitor -- -- 10 - Worst Possible Pain      Vitals      Date and Time Temp Pulse SpO2 Resp BP Pain Score FACES Pain Rating User   03/01/25 2305 -- -- -- -- -- 10 - Worst Possible Pain -- ID   03/01/25 2255 -- -- -- -- -- 10 - Worst Possible Pain -- SR   03/01/25 2239 98.1 °F (36.7 °C) 88 94 % 22 140/79 10 - Worst Possible Pain -- MD            Physical Exam  Vitals and nursing note reviewed.   Constitutional:       General: He is not in acute distress.     Appearance: Normal appearance. He is not ill-appearing, toxic-appearing or diaphoretic.   HENT:      Head: Normocephalic and atraumatic.      Nose: Nose normal. No congestion or rhinorrhea.      Mouth/Throat:      Mouth: Mucous membranes are moist.      Pharynx: Oropharynx is clear. No oropharyngeal exudate or posterior oropharyngeal erythema.   Eyes:      General:         Right eye: No discharge.         Left eye: No discharge.   Neck:      Comments: Tenderness to palpation of the left lateral chest wall and anterior pectoral region, no bony or subcutaneous crepitus.  Reproduction of pain with movement of the left arm.  Cardiovascular:      Rate and Rhythm: Normal rate and regular rhythm.      Pulses: Normal pulses.      Heart sounds: Normal heart sounds. No murmur heard.     No gallop.   Pulmonary:      Effort: Pulmonary effort is normal. No respiratory  distress.      Breath sounds: Normal breath sounds. No stridor. No wheezing, rhonchi or rales.   Chest:      Chest wall: Tenderness present.   Abdominal:      General: Bowel sounds are normal. There is no distension.      Palpations: Abdomen is soft. There is no mass.      Tenderness: There is no abdominal tenderness. There is no right CVA tenderness, left CVA tenderness, guarding or rebound.      Hernia: No hernia is present.   Musculoskeletal:         General: Normal range of motion.      Cervical back: Normal range of motion and neck supple.   Skin:     General: Skin is warm and dry.      Capillary Refill: Capillary refill takes less than 2 seconds.   Neurological:      General: No focal deficit present.      Mental Status: He is alert and oriented to person, place, and time.      Cranial Nerves: No cranial nerve deficit.      Sensory: No sensory deficit.      Motor: No weakness.      Coordination: Coordination normal.      Gait: Gait normal.      Deep Tendon Reflexes: Reflexes normal.   Psychiatric:         Mood and Affect: Mood normal.         Behavior: Behavior normal.         Thought Content: Thought content normal.         Judgment: Judgment normal.         Results Reviewed       Procedure Component Value Units Date/Time    HS Troponin 0hr (reflex protocol) [[id number]]  (Normal) Collected: 03/01/25 2254    Lab Status: Final result Specimen: Blood from Arm, Right Updated: 03/01/25 2323     hs TnI 0hr 3 ng/L     CBC and differential [[id number]]  (Abnormal) Collected: 03/01/25 2314    Lab Status: Final result Specimen: Blood from Arm, Right Updated: 03/01/25 2319     WBC 5.59 Thousand/uL      RBC 4.22 Million/uL      Hemoglobin 12.8 g/dL      Hematocrit 38.0 %      MCV 90 fL      MCH 30.3 pg      MCHC 33.7 g/dL      RDW 14.5 %      MPV 10.0 fL      Platelets 238 Thousands/uL      nRBC 0 /100 WBCs      Segmented % 35 %      Immature Grans % 0 %      Lymphocytes % 54 %      Monocytes % 8 %      Eosinophils  Relative 2 %      Basophils Relative 1 %      Absolute Neutrophils 1.94 Thousands/µL      Absolute Immature Grans 0.01 Thousand/uL      Absolute Lymphocytes 3.07 Thousands/µL      Absolute Monocytes 0.43 Thousand/µL      Eosinophils Absolute 0.10 Thousand/µL      Basophils Absolute 0.04 Thousands/µL     Comprehensive metabolic panel [[id number]] Collected: 03/01/25 2255    Lab Status: Final result Specimen: Blood from Arm, Right Updated: 03/01/25 8126     Sodium 138 mmol/L      Potassium 3.8 mmol/L      Chloride 104 mmol/L      CO2 27 mmol/L      ANION GAP 7 mmol/L      BUN 9 mg/dL      Creatinine 0.87 mg/dL      Glucose 135 mg/dL      Calcium 9.2 mg/dL      AST 21 U/L      ALT 21 U/L      Alkaline Phosphatase 103 U/L      Total Protein 7.6 g/dL      Albumin 4.2 g/dL      Total Bilirubin 0.40 mg/dL      eGFR 97 ml/min/1.73sq m     Narrative:      National Kid[employer] guidelines for Chronic Kidney Disease (CKD):     Stage 1 with normal or high GFR (GFR > 90 mL/min/1.73 square meters)    Stage 2 Mild CKD (GFR = 60-89 mL/min/1.73 square meters)    Stage 3A Moderate CKD (GFR = 45-59 mL/min/1.73 square meters)    Stage 3B Moderate CKD (GFR = 30-44 mL/min/1.73 square meters)    Stage 4 Severe CKD (GFR = 15-29 mL/min/1.73 square meters)    Stage 5 End Stage CKD (GFR <15 mL/min/1.73 square meters)  Note: GFR calculation is accurate only with a steady state creatinine            XR chest 1 view portable    (Results Pending)       Procedures    ED Medication and Procedure Management   Prior to Admission Medications   Prescriptions Last Dose Informant Patient Reported? Taking?   DULoxetine (CYMBALTA) 60 mg delayed release capsule   No No   Sig: Take 1 capsule by mouth once daily   HYDROcodone-acetaminophen (NORCO) 5-325 mg per tablet   No No   Sig: Take 1 tablet by mouth every 8 (eight) hours as needed for pain (do not exceed 3 tablets in 24 hours) Max Daily Amount: 3 tablets   SUMAtriptan (IMITREX) 100 mg tablet    No No   Sig: Take 1 tablet (100 mg total) by mouth as needed for migraine May repeat in 2 hours. Limit of 3/week or 9/month   acetaminophen (TYLENOL) 325 mg tablet  Self No No   Sig: Take 1 tablet (325 mg total) by mouth every 6 (six) hours as needed for mild pain   Patient not taking: Reported on 1/27/2025   amLODIPine (NORVASC) 5 mg tablet   No No   Sig: Take 1 tablet by mouth once daily   amitriptyline (ELAVIL) 50 mg tablet   No No   Sig: Take 1 tablet (50 mg total) by mouth daily at bedtime   atorvastatin (LIPITOR) 80 mg tablet   No No   Sig: Take 1 tablet by mouth once daily   ezetimibe (ZETIA) 10 mg tablet   No No   Sig: Take 1 tablet by mouth once daily   metFORMIN (GLUCOPHAGE-XR) 500 mg 24 hr tablet   No No   Sig: Take 1 tablet by mouth once daily with breakfast   metoprolol succinate (TOPROL-XL) 25 mg 24 hr tablet   No No   Sig: Take 1 tablet by mouth once daily   naproxen (NAPROSYN) 500 mg tablet   No No   Sig: Take 1 tablet (500 mg total) by mouth 2 (two) times a day as needed for mild pain or moderate pain   Patient not taking: Reported on 1/27/2025   omeprazole (PriLOSEC) 20 mg delayed release capsule   No No   Sig: Take 1 capsule by mouth once daily   semaglutide, 1 mg/dose, (Ozempic) 4 mg/3 mL injection pen   No No   Sig: Inject 0.75 mL (1 mg total) under the skin once a week      Facility-Administered Medications: None     Patient's Medications   Discharge Prescriptions    No medications on file     No discharge procedures on file.  ED SEPSIS DOCUMENTATION   Time reflects when diagnosis was documented in both MDM as applicable and the Disposition within this note       Time User Action Codes Description Comment    3/1/2025 11:31 PM Bashir Angulo Add [R07.9] Chest pain, unspecified type                  Bashir Angulo MD  03/02/25 0005       Bashir Angulo MD  03/02/25 0005

## 2025-03-03 LAB
ATRIAL RATE: 86 BPM
P AXIS: 37 DEGREES
PR INTERVAL: 144 MS
QRS AXIS: -28 DEGREES
QRSD INTERVAL: 80 MS
QT INTERVAL: 360 MS
QTC INTERVAL: 430 MS
T WAVE AXIS: 70 DEGREES
VENTRICULAR RATE: 86 BPM

## 2025-05-05 DIAGNOSIS — G43.109 MIGRAINE WITH AURA AND WITHOUT STATUS MIGRAINOSUS, NOT INTRACTABLE: ICD-10-CM

## 2025-05-06 ENCOUNTER — TELEPHONE (OUTPATIENT)
Age: 55
End: 2025-05-06

## 2025-05-06 RX ORDER — AMITRIPTYLINE HYDROCHLORIDE 50 MG/1
50 TABLET ORAL
Qty: 90 TABLET | Refills: 1 | Status: SHIPPED | OUTPATIENT
Start: 2025-05-06

## 2025-05-06 NOTE — TELEPHONE ENCOUNTER
Caller: Christopher     Doctor: Dr. Maurer     Reason for call: Patient calling stating 10=/10 pain level and would like to schedule procedure please advise     Call back#: [phone]

## 2025-05-06 NOTE — TELEPHONE ENCOUNTER
Injection type: right L3-4 TFESI  Last injection date:1/7/2025  Last office visit: 12/30/24  Where is pain located: lower right back/buttock/hip  Is the pain the same area as before: Yes  Current pain level: 10/10  How much % of relief did the last injection provide:60%  Duration of relief from last injection: 3.5 months  When did pain return: 3weeks      Blood thinners/NSAIDS? no  Diabetes? Yes  no updated [id number]- needs blood work done before he sees his PCP on 5/27            CGM- no      Pt aware nurse to discuss with VS, once okay is given to repeat injection SPA  will reach out to pt to assist in scheduling. Pt verbalized understanding and appreciative of call.

## 2025-05-07 ENCOUNTER — PREP FOR PROCEDURE (OUTPATIENT)
Dept: PAIN MEDICINE | Facility: CLINIC | Age: 55
End: 2025-05-07

## 2025-05-07 DIAGNOSIS — M54.16 LUMBAR RADICULOPATHY: Primary | ICD-10-CM

## 2025-05-07 NOTE — TELEPHONE ENCOUNTER
SPOKE TO PATIENT AND HE IS SCHEDULED. Patient aware of instructions. No food/drink one hour prior. Wear comfortable clothing. A  is required. Denies blood thinners. Continue all other prescribed medications on day of procedure. Call if prescribed an antibiotic or become sick. Refrain from vaccinations two weeks prior and two weeks after. Patient aware [facility] nurse will call day prior with instructions and report time. Call with questions.

## 2025-05-08 ENCOUNTER — APPOINTMENT (OUTPATIENT)
Dept: LAB | Facility: CLINIC | Age: 55
End: 2025-05-08
Payer: COMMERCIAL

## 2025-05-08 DIAGNOSIS — E11.9 TYPE 2 DIABETES MELLITUS WITHOUT COMPLICATION, WITHOUT LONG-TERM CURRENT USE OF INSULIN (HCC): ICD-10-CM

## 2025-05-08 DIAGNOSIS — I25.10 CORONARY ARTERY DISEASE INVOLVING NATIVE CORONARY ARTERY OF NATIVE HEART WITHOUT ANGINA PECTORIS: ICD-10-CM

## 2025-05-08 LAB
BASOPHILS # BLD AUTO: 0.06 THOUSANDS/ÂΜL (ref 0–0.1)
BASOPHILS NFR BLD AUTO: 1 % (ref 0–1)
CHOLEST SERPL-MCNC: 122 MG/DL (ref ?–200)
CREAT UR-MCNC: 400 MG/DL
EOSINOPHIL # BLD AUTO: 0.06 THOUSAND/ÂΜL (ref 0–0.61)
EOSINOPHIL NFR BLD AUTO: 1 % (ref 0–6)
ERYTHROCYTE [DISTWIDTH] IN BLOOD BY AUTOMATED COUNT: 12.8 % (ref 11.6–15.1)
EST. AVERAGE GLUCOSE BLD GHB EST-MCNC: 140 MG/DL
HBA1C MFR BLD: 6.5 %
HCT VFR BLD AUTO: 42.2 % (ref 36.5–49.3)
HDLC SERPL-MCNC: 32 MG/DL
HGB BLD-MCNC: 14 G/DL (ref 12–17)
IMM GRANULOCYTES # BLD AUTO: 0.01 THOUSAND/UL (ref 0–0.2)
IMM GRANULOCYTES NFR BLD AUTO: 0 % (ref 0–2)
LDLC SERPL CALC-MCNC: 62 MG/DL (ref 0–100)
LYMPHOCYTES # BLD AUTO: 2.54 THOUSANDS/ÂΜL (ref 0.6–4.47)
LYMPHOCYTES NFR BLD AUTO: 53 % (ref 14–44)
MCH RBC QN AUTO: 30 PG (ref 26.8–34.3)
MCHC RBC AUTO-ENTMCNC: 33.2 G/DL (ref 31.4–37.4)
MCV RBC AUTO: 90 FL (ref 82–98)
MICROALBUMIN UR-MCNC: 33 MG/L
MICROALBUMIN/CREAT 24H UR: 8 MG/G CREATININE (ref 0–30)
MONOCYTES # BLD AUTO: 0.39 THOUSAND/ÂΜL (ref 0.17–1.22)
MONOCYTES NFR BLD AUTO: 8 % (ref 4–12)
NEUTROPHILS # BLD AUTO: 1.76 THOUSANDS/ÂΜL (ref 1.85–7.62)
NEUTS SEG NFR BLD AUTO: 37 % (ref 43–75)
NONHDLC SERPL-MCNC: 90 MG/DL
NRBC BLD AUTO-RTO: 0 /100 WBCS
PLATELET # BLD AUTO: 235 THOUSANDS/UL (ref 149–390)
PMV BLD AUTO: 11.7 FL (ref 8.9–12.7)
RBC # BLD AUTO: 4.67 MILLION/UL (ref 3.88–5.62)
TRIGL SERPL-MCNC: 142 MG/DL (ref ?–150)
WBC # BLD AUTO: 4.82 THOUSAND/UL (ref 4.31–10.16)

## 2025-05-08 PROCEDURE — 83036 HEMOGLOBIN GLYCOSYLATED A1C: CPT

## 2025-05-08 PROCEDURE — 85025 COMPLETE CBC W/AUTO DIFF WBC: CPT

## 2025-05-08 PROCEDURE — 36415 COLL VENOUS BLD VENIPUNCTURE: CPT

## 2025-05-08 PROCEDURE — 80061 LIPID PANEL: CPT

## 2025-05-08 PROCEDURE — 82570 ASSAY OF URINE CREATININE: CPT

## 2025-05-08 PROCEDURE — 82043 UR ALBUMIN QUANTITATIVE: CPT

## 2025-05-09 ENCOUNTER — RESULTS FOLLOW-UP (OUTPATIENT)
Dept: FAMILY MEDICINE CLINIC | Facility: CLINIC | Age: 55
End: 2025-05-09

## 2025-05-09 DIAGNOSIS — D72.820 LYMPHOCYTOSIS: Primary | ICD-10-CM

## 2025-05-20 ENCOUNTER — HOSPITAL ENCOUNTER (OUTPATIENT)
Dept: INTERVENTIONAL RADIOLOGY/VASCULAR | Facility: HOSPITAL | Age: 55
Discharge: HOME | End: 2025-05-20
Attending: ANESTHESIOLOGY
Payer: COMMERCIAL

## 2025-05-20 VITALS
RESPIRATION RATE: 20 BRPM | SYSTOLIC BLOOD PRESSURE: 141 MMHG | HEART RATE: 63 BPM | BODY MASS INDEX: 33.74 KG/M2 | WEIGHT: 215 LBS | HEIGHT: 67 IN | DIASTOLIC BLOOD PRESSURE: 81 MMHG | OXYGEN SATURATION: 97 % | TEMPERATURE: 97.4 F

## 2025-05-20 DIAGNOSIS — M54.16 LUMBAR RADICULOPATHY: ICD-10-CM

## 2025-05-20 LAB — GLUCOSE SERPL-MCNC: 172 MG/DL (ref 65–140)

## 2025-05-20 PROCEDURE — 64483 NJX AA&/STRD TFRM EPI L/S 1: CPT | Performed by: ANESTHESIOLOGY

## 2025-05-20 PROCEDURE — 64484 NJX AA&/STRD TFRM EPI L/S EA: CPT | Performed by: ANESTHESIOLOGY

## 2025-05-20 PROCEDURE — 82948 REAGENT STRIP/BLOOD GLUCOSE: CPT

## 2025-05-20 RX ORDER — LIDOCAINE HYDROCHLORIDE 10 MG/ML
INJECTION, SOLUTION EPIDURAL; INFILTRATION; INTRACAUDAL; PERINEURAL AS NEEDED
Status: COMPLETED | OUTPATIENT
Start: 2025-05-20 | End: 2025-05-20

## 2025-05-20 RX ORDER — BETAMETHASONE SODIUM PHOSPHATE AND BETAMETHASONE ACETATE 3; 3 MG/ML; MG/ML
INJECTION, SUSPENSION INTRA-ARTICULAR; INTRALESIONAL; INTRAMUSCULAR; SOFT TISSUE AS NEEDED
Status: COMPLETED | OUTPATIENT
Start: 2025-05-20 | End: 2025-05-20

## 2025-05-20 RX ADMIN — BETAMETHASONE SODIUM PHOSPHATE AND BETAMETHASONE ACETATE 30 MG: 3; 3 INJECTION, SUSPENSION INTRA-ARTICULAR; INTRALESIONAL; INTRAMUSCULAR at 11:42

## 2025-05-20 RX ADMIN — IOHEXOL 2 ML: 240 INJECTION, SOLUTION INTRATHECAL; INTRAVASCULAR; INTRAVENOUS; ORAL at 11:41

## 2025-05-20 RX ADMIN — LIDOCAINE HYDROCHLORIDE 10 ML: 10 INJECTION, SOLUTION EPIDURAL; INFILTRATION; INTRACAUDAL; PERINEURAL at 11:36

## 2025-05-20 NOTE — DISCHARGE INSTR - AVS FIRST PAGE
YOUR 2 WEEK FOLLOW UP HAS BEEN SCHEDULED; IF YOU WISH TO CHANGE THE FOLLOW UP, PLEASE CALL THE [facility] [place]: [phone]    EPIDURAL STEROID INJECTION DISCHARGE INSTRUCTIONS  WHAT YOU NEED TO KNOW:   An epidural steroid injection (JASPAL) is a procedure to inject steroid medicine into the epidural space. The epidural space is between your spinal cord and vertebrae. Steroids reduce inflammation and fluid buildup in your spine that may be causing pain. You may be given pain medicine along with the steroids.        DISCHARGE INSTRUCTIONS:   Call your local emergency number (911 in the [place]) if:   You have a seizure.    You have trouble moving your legs.    Seek care immediately if:   Blood soaks through your bandage.    You have a fever or chills, severe back pain, and the procedure area is sensitive to the touch.    You cannot control when you urinate or have a bowel movement.    Call your doctor if:   You have weakness or numbness in your legs.    Your wound is red, swollen, or draining pus.    You have nausea or are vomiting.    Your face or neck is red and you feel warm.    You have more pain than you had before the procedure.    You have swelling in your hands or feet.    You have questions or concerns about your condition or care.    Care for your wound as directed:  You may remove the bandage before you go to bed the day of your procedure. You may take a shower, but do not take a bath for at least 24 hours.   Self-care:   Do not drive,  use machines, or do strenuous activity for 24 hours after your procedure or as directed.     Continue other treatments  as directed. Steroid injections alone will not control your pain. The injections are meant to be used with other treatments, such as physical therapy.    Follow up with your doctor as directed:  Write down your questions so you remember to ask them during your visits.           ACTIVITY  Do not drive or operate machinery today.  No strenuous  activity today - bending, lifting, etc.   You may resume normal activities starting tomorrow - start slowly and as tolerated.  You may shower today, but not tub baths or hot tubs.  You may have numbness for several hours from the local anesthetics. Please use caution and common sense, especially with weight-bearing activities.    CARE OF THE INJECTION SITE  If you have soreness or pain apply ice to the area today (20 minutes on and 20 minutes off).  Starting tomorrow, you may resume normal activities  Notify the [facility] if you have any of the following: redness, drainage, swelling or fever above 100°F.      MEDICATIONS  Continue to take all routine medications.  Our office may have instructed you to hold some medications. You may restart medications, including blood thinners.

## 2025-05-20 NOTE — H&P
Assessment  1. Lumbar radiculopathy  -     IR spine and pain procedure; Standing  -     IR spine and pain procedure    Greater than 90% relief of pain with improved ability to participate with IADLs after right L3 and L4 TFESI for nearly 5 months. Patient has completed formal PT with modest benefit and notes at least 30% relief of pain and improved function taking lyrica without side effects. Continues to report the following symptomatology:     Right sided lumbar radicular pain in the L3 and L4 dermatomal distribution accompanied by pain limited weakness numbness and paresthesias.  Patient participated with PT without significant relief. Chronic pain with decreased participation with IADLs over the past 3 months.  Has been taking OTC ibuprofen and tylenol in addition to lyrica without significant benefit.  5/5 strength bilaterally, positive SLR right sided. Reflexes 2+.  Additionally there is positive facet loading,  right greater than left Denies any gait instability, saddle anesthesia. MRI shows at  L3-L4: Disc bulge with a superimposed right foraminal disc herniation. There is narrowing of the subarticular recess on both sides. Marked facet arthropathy. There is moderate-marked compressive right neuroforaminal narrowing with mild-moderate left neuroforaminal narrowing. Mild spinal canal stenosis. At L4-L5: Disc bulge with superimposed central disc herniation that is inferiorly directed. Narrowing of the subarticular recess on both sides with associated mass effect. Moderate bilateral neuroforaminal narrowing. Moderate spinal canal stenosis Risks, benefits alternatives epidural steroid injections thoroughly discussed with patient.  Handouts provided questions answered to patient's satisfaction.  Lifestyle modifications extensively discussed including diet, exercise and weight loss in addition to core strengthening.      Right sided hip pain described primarily by arthritic features.  Aching, nagging,  indolent, stabbing, throbbing features in left hip radiating to left groin. Pain with internal and external rotation of right hip, ttp over rightGTB. Moderate right hip OA on xray. Risks, benefits and alternatives to left hip intra-articular joint injection thoroughly discussed with patient.  Handouts provided questions answered to patient satisfaction.    Plan  -Right L3 and L4 TFESI; f/u 2 weeks post procedure  -norco 5-325mg [id number] prn pain rx, risks reviewed below; pain contract reviewed and signed by both parties, Uds obtained, will follow result;   -lyrica 75 mg t.i.d. Ordered for patient; to taper given decreased efficacy; counseled regarding sedative effects of taking this medication and provided up titration calendar.  Counseled not to take medication while driving or operating heavy machinery/using stairs  -has been participant with formal physical therapy for right-sided lumbar radiculopathy, hip pain; Physician directed home exercise plan as per [employer] demonstrated and handouts provided that patient plans to participate with for 1 hour, twice a week for the next 6 weeks.     There are risks associated with opioid medications, including dependence, addiction and tolerance. The patient understands and agrees to use these medications only as prescribed. Potential side effects of the medications include, but are not limited to, constipation, drowsiness, addiction, impaired judgment and risk of fatal overdose if not taken as prescribed. The patient was warned against driving while taking sedation medications or operating heavy machinery. The patient voiced understanding. Sharing medications is a felony. At this point in time, the patient is showing no signs of addiction, abuse, diversion or suicidal ideation.     [place] Prescription Drug Monitoring Program report was reviewed and was appropriate      Complete risks and benefits including bleeding, infection, tissue reaction, nerve injury and allergic  reaction were discussed. The approach was demonstrated using models and literature was provided. Verbal and written consent was obtained.     My impressions and treatment recommendations were discussed in detail with the patient who verbalized understanding and had no further questions.  Discharge instructions were provided. I personally saw and examined the patient and I agree with the above discussed plan of care.    No orders of the defined types were placed in this encounter.      History of Present Illness    Christopher Smalls Jr. is a 55 y.o. male with pmhx of HTN, XOL, migraines presenting with subacute lumbar radicular pain in the right L3 and L4 dermatomal distributions. Debilitating pain limited weakness numbness and paresthesias accompany the pain. The patient rates the pain at a 8/10 accompanied by electric shock-like shooting features and crampy burning pain in the aforementioned dermatomal distributions.  The pain is worse in the mornings as well as the end of the day; exertion such as walking for long periods of time seems to exacerbate the pain. He tries to maintain an active lifestyle and finds that the current degree of pain seems to compromises his efforts.  The pain significantly impacts independent activities of daily living and contributes to significant disability.  He has attempted PT this year with modest relief.  He has taken naproxen, tylenol as well as gabapentin with limited relief of the pain as well.  Prior right hip injection and ILESI unsuccessful in helping to control pain. He denies any bowel or bladder dysfunction/incontinence, saddle anesthesia or gait instability.    Right hip pain described primarily as arthritic in nature. 8/10 right hip pain that is worse in the mornings and worse at the end of the day.  The pain is characterized by achy, nagging, indolent, crampy, stabbing pain in right hip.  The patient describes that the pain is worse with standing for long periods of time on  hard surfaces as well as with walking.  The patient is a very active individual and feels as though this pain compromises his participation with independent activities of daily living. The pain can be debilitating at times and contribute to significant disability, compromising overall activity and independent activities of daily living.     I have personally reviewed and/or updated the patient's past medical history, past surgical history, family history, social history, current medications, allergies, and vital signs today.     Review of Systems   Constitutional:  Positive for activity change.   HENT: Negative.     Eyes: Negative.    Respiratory: Negative.     Cardiovascular: Negative.    Gastrointestinal: Negative.    Endocrine: Negative.    Genitourinary: Negative.    Musculoskeletal:  Positive for arthralgias, back pain, gait problem and myalgias.   Skin: Negative.    Allergic/Immunologic: Negative.    Neurological:  Positive for weakness and numbness.   Hematological: Negative.    Psychiatric/Behavioral: Negative.     All other systems reviewed and are negative.      Patient Active Problem List   Diagnosis    Abnormal brain MRI    Aortic regurgitation    Aortic valve sclerosis    Mitral regurgitation    Aortic valve mass    Coronary artery disease involving native coronary artery of native heart without angina pectoris    Essential hypertension    GE reflux    Mixed hyperlipidemia    History of tobacco use disorder    TIA (transient ischemic attack)    Prostate CA (HCC)    Type 2 diabetes mellitus without complication, without long-term current use of insulin (HCC)    Depression    Chronic pain of both shoulders    History of prostate cancer    Bilateral hip pain    History of cardiomyopathy    Migraine with aura and without status migrainosus, not intractable    Other spondylosis with radiculopathy, lumbar region    Primary osteoarthritis of right hip    Chronic pain syndrome    Long term prescription opiate  use       Past Medical History:   Diagnosis Date    Abnormal brain MRI     Aortic regurgitation     Aortic valve mass     Arthritis     Arthritis     Back pain     Bowel obstruction (HCC)     CAD in native artery     Coronary arteriosclerosis     Coronary artery disease     Diabetes (HCC)     Diabetes mellitus (HCC)     ED (erectile dysfunction)     Elevated PSA     GE reflux     Hand tingling     Headache     Headache, tension-type     Heart attack (HCC)     Heart murmur     High cholesterol     Hypertension     Hypertension     Left leg pain     Low back pain     Lumbar radiculopathy     Migraine     Mitral regurgitation     Mood disorder (HCC)     Myocardial infarct (HCC)     Osteoarthritis of wrist     Osteoporosis     Prediabetes     Prostate cancer (HCC)     Psychiatric disorder     Right shoulder pain     Sciatica     Spinal osteoarthritis     Sprain rotator cuff     Sprain, lumbar     Stress incontinence     TIA (transient ischemic attack)     Trigger ring finger of right hand     Urinary leakage     Weight disorder        Past Surgical History:   Procedure Laterality Date    APPENDECTOMY      CARDIAC CATHETERIZATION      CARDIAC SURGERY      CARDIOVASCULAR STRESS TEST      CHOLECYSTECTOMY      COLECTOMY      partial for bowel obstruction    HERNIA REPAIR      IR SPINE AND PAIN PROCEDURE  11/14/2024    IR SPINE AND PAIN PROCEDURE  12/12/2024    IR SPINE AND PAIN PROCEDURE  1/7/2025    OTHER SURGICAL HISTORY      Stent placement    IN SURGICAL ARTHROSCOPY SHOULDER W/ROTATOR CUFF RPR Right 3/13/2024    Procedure: REPAIR ROTATOR CUFF  ARTHROSCOPIC, subacromial decompression;  Surgeon: Randy Blue MD;  Location: [facility];  Service: Orthopedics    PROSTATE SURGERY      Radical prostatectomy       Family History   Problem Relation Age of Onset    Breast cancer Mother     Hypertension Mother     Heart disease Father     Hypertension Father     Prostate cancer Father     Rheum arthritis Father     Stroke  Father     Heart disease Sister     Hypertension Sister     Other Sister         Resp. disease       Social History     Occupational History    Not on file   Tobacco Use    Smoking status: Every Day     Current packs/day: 0.25     Average packs/day: 2.9 packs/day for 50.1 years (147.8 ttl pk-yrs)     Types: Cigarettes     Start date: 4/15/2024     Last attempt to quit: 12/20/2023     Passive exposure: Past    Smokeless tobacco: Never   Vaping Use    Vaping status: Every Day    Start date: 9/20/2024    Substances: Nicotine   Substance and Sexual Activity    Alcohol use: Not Currently     Comment: occasionally    Drug use: Not Currently     Types: Marijuana, Cocaine    Sexual activity: Not Currently     Partners: Female     Birth control/protection: None       Current Outpatient Medications on File Prior to Encounter   Medication Sig    amitriptyline (ELAVIL) 50 mg tablet TAKE 1 TABLET BY MOUTH ONCE DAILY AT BEDTIME    amLODIPine (NORVASC) 5 mg tablet Take 1 tablet by mouth once daily    atorvastatin (LIPITOR) 80 mg tablet Take 1 tablet by mouth once daily    DULoxetine (CYMBALTA) 60 mg delayed release capsule Take 1 capsule by mouth once daily    ezetimibe (ZETIA) 10 mg tablet Take 1 tablet by mouth once daily    metoprolol succinate (TOPROL-XL) 25 mg 24 hr tablet Take 1 tablet by mouth once daily    omeprazole (PriLOSEC) 20 mg delayed release capsule Take 1 capsule by mouth once daily    acetaminophen (TYLENOL) 325 mg tablet Take 1 tablet (325 mg total) by mouth every 6 (six) hours as needed for mild pain (Patient not taking: Reported on 1/27/2025)    HYDROcodone-acetaminophen (NORCO) 5-325 mg per tablet Take 1 tablet by mouth every 8 (eight) hours as needed for pain (do not exceed 3 tablets in 24 hours) Max Daily Amount: 3 tablets    metFORMIN (GLUCOPHAGE-XR) 500 mg 24 hr tablet Take 1 tablet by mouth once daily with breakfast    naproxen (NAPROSYN) 500 mg tablet Take 1 tablet (500 mg total) by mouth 2 (two) times  a day as needed for mild pain or moderate pain (Patient not taking: Reported on 1/27/2025)    semaglutide, 1 mg/dose, (Ozempic) 4 mg/3 mL injection pen Inject 0.75 mL (1 mg total) under the skin once a week    SUMAtriptan (IMITREX) 100 mg tablet Take 1 tablet (100 mg total) by mouth as needed for migraine May repeat in 2 hours. Limit of 3/week or 9/month     No current facility-administered medications on file prior to encounter.       No Known Allergies      Physical Exam    There were no vitals taken for this visit.    Constitutional: normal, well developed, well nourished, alert, in no distress and non-toxic and no overt pain behavior. and obese  Eyes: anicteric  HEENT: grossly intact  Neck: supple, symmetric, trachea midline and no masses   Pulmonary:even and unlabored  Cardiovascular:No edema or pitting edema present  Skin:Normal without rashes or lesions and well hydrated  Psychiatric:Mood and affect appropriate  Neurologic:Cranial Nerves II-XII grossly intact Sensation grossly intact; no clonus negative amezcua's. Reflexes 2+ and brisk. SLR negative bilaterally.   Musculoskeletal:normal gait. 5/5 strength bilaterally with AROM in lower extremities. Dififculty with normal heel toe and tip toe walking. Significant pain with lumbar facet loading bilaterally and with lateral spine rotation, ttp over lumbar paraspinal muscles, right greater than left. Negative emili's test, negative gaenslen's negative SIJ loading bilaterally. Ttp over right gtb, pain with internal and external rotation of right hip    Imaging    MRI LUMBAR SPINE WITHOUT CONTRAST     INDICATION: M47.26: Other spondylosis with radiculopathy, lumbar region.     COMPARISON: Lumbar spine radiographs 2/26/2024.     TECHNIQUE:  Multiplanar, multisequence imaging of the lumbar spine was performed. .        IMAGE QUALITY:  Diagnostic     FINDINGS:     VERTEBRAL BODIES: Mild, grade 1 anterolisthesis of L3 on L4. No acute fracture.     SACRUM: No acute  abnormality.     DISTAL CORD AND CONUS:  Normal size and signal within the distal cord and conus.     PARASPINAL SOFT TISSUES: No acute abnormality.     LOWER THORACIC DISC SPACES: Spondylotic changes without acute critical central canal stenosis.     LUMBAR DISC SPACES: Multilevel mild degenerative disc disease.     L1-L2: Disc bulge. Moderate-marked facet arthropathy. No significant neuroforaminal narrowing or spinal canal stenosis.     L2-L3: Disc bulge. Moderate-marked facet arthropathy, right greater than left. No significant neuroforaminal narrowing. No significant spinal canal stenosis.     L3-L4: Disc bulge with a superimposed right foraminal disc herniation. There is narrowing of the subarticular recess on both sides. Marked facet arthropathy.     There is moderate-marked compressive right neuroforaminal narrowing with mild-moderate left neuroforaminal narrowing. Mild spinal canal stenosis.     L4-L5: Disc bulge with superimposed central disc herniation that is inferiorly directed. Narrowing of the subarticular recess on both sides with associated mass effect. Moderate bilateral neuroforaminal narrowing. Moderate spinal canal stenosis.     L5-S1: Disc bulge with superimposed central disc herniation and annular fissure. Abutment of the right greater than left traversing bilateral S1 nerve roots. Moderate-marked left with mild-moderate right neuroforaminal narrowing. Moderate spinal canal   stenosis.     IMPRESSION:     Multilevel advanced compressive spondylotic changes of the lumbar spine. See narrative above for full details.

## 2025-05-27 ENCOUNTER — APPOINTMENT (OUTPATIENT)
Dept: LAB | Facility: CLINIC | Age: 55
End: 2025-05-27
Payer: COMMERCIAL

## 2025-05-27 ENCOUNTER — TELEPHONE (OUTPATIENT)
Dept: FAMILY MEDICINE CLINIC | Facility: CLINIC | Age: 55
End: 2025-05-27

## 2025-05-27 ENCOUNTER — OFFICE VISIT (OUTPATIENT)
Dept: FAMILY MEDICINE CLINIC | Facility: CLINIC | Age: 55
End: 2025-05-27
Payer: COMMERCIAL

## 2025-05-27 VITALS
HEART RATE: 85 BPM | SYSTOLIC BLOOD PRESSURE: 139 MMHG | DIASTOLIC BLOOD PRESSURE: 79 MMHG | WEIGHT: 215.83 LBS | BODY MASS INDEX: 33.88 KG/M2 | OXYGEN SATURATION: 98 % | HEIGHT: 67 IN

## 2025-05-27 DIAGNOSIS — K21.9 GERD (GASTROESOPHAGEAL REFLUX DISEASE): ICD-10-CM

## 2025-05-27 DIAGNOSIS — G43.109 MIGRAINE WITH AURA AND WITHOUT STATUS MIGRAINOSUS, NOT INTRACTABLE: ICD-10-CM

## 2025-05-27 DIAGNOSIS — Z00.00 ANNUAL PHYSICAL EXAM: Primary | ICD-10-CM

## 2025-05-27 DIAGNOSIS — F32.A DEPRESSION: ICD-10-CM

## 2025-05-27 DIAGNOSIS — E11.9 TYPE 2 DIABETES MELLITUS WITHOUT COMPLICATION, WITHOUT LONG-TERM CURRENT USE OF INSULIN (HCC): ICD-10-CM

## 2025-05-27 DIAGNOSIS — C61 PROSTATE CA (HCC): ICD-10-CM

## 2025-05-27 DIAGNOSIS — I25.10 CORONARY ARTERY DISEASE INVOLVING NATIVE CORONARY ARTERY OF NATIVE HEART WITHOUT ANGINA PECTORIS: ICD-10-CM

## 2025-05-27 DIAGNOSIS — I35.1 NONRHEUMATIC AORTIC VALVE INSUFFICIENCY: ICD-10-CM

## 2025-05-27 DIAGNOSIS — I25.118 CORONARY ARTERY DISEASE OF NATIVE ARTERY OF NATIVE HEART WITH STABLE ANGINA PECTORIS (HCC): ICD-10-CM

## 2025-05-27 DIAGNOSIS — Z23 ENCOUNTER FOR IMMUNIZATION: ICD-10-CM

## 2025-05-27 DIAGNOSIS — E78.2 MIXED HYPERLIPIDEMIA: ICD-10-CM

## 2025-05-27 DIAGNOSIS — I10 ESSENTIAL HYPERTENSION: ICD-10-CM

## 2025-05-27 DIAGNOSIS — F33.42 RECURRENT MAJOR DEPRESSIVE DISORDER, IN FULL REMISSION (HCC): ICD-10-CM

## 2025-05-27 DIAGNOSIS — D72.820 LYMPHOCYTOSIS: ICD-10-CM

## 2025-05-27 DIAGNOSIS — Z87.891 HISTORY OF TOBACCO USE DISORDER: ICD-10-CM

## 2025-05-27 DIAGNOSIS — K21.9 GASTROESOPHAGEAL REFLUX DISEASE, UNSPECIFIED WHETHER ESOPHAGITIS PRESENT: ICD-10-CM

## 2025-05-27 PROBLEM — G45.9 TIA (TRANSIENT ISCHEMIC ATTACK): Status: RESOLVED | Noted: 2024-01-15 | Resolved: 2025-05-27

## 2025-05-27 LAB
BASOPHILS # BLD AUTO: 0.07 THOUSAND/UL (ref 0–0.1)
BASOPHILS NFR MAR MANUAL: 1 % (ref 0–1)
CRP SERPL QL: 2 MG/L
EOSINOPHIL # BLD AUTO: 0.07 THOUSAND/UL (ref 0–0.61)
EOSINOPHIL NFR BLD MANUAL: 1 % (ref 0–6)
ERYTHROCYTE [DISTWIDTH] IN BLOOD BY AUTOMATED COUNT: 13 % (ref 11.6–15.1)
ERYTHROCYTE [SEDIMENTATION RATE] IN BLOOD: 34 MM/HOUR (ref 0–19)
HCT VFR BLD AUTO: 44.3 % (ref 36.5–49.3)
HGB BLD-MCNC: 14.8 G/DL (ref 12–17)
LYMPHOCYTES # BLD AUTO: 4.97 THOUSAND/UL (ref 0.6–4.47)
LYMPHOCYTES # BLD AUTO: 68 %
MCH RBC QN AUTO: 29.6 PG (ref 26.8–34.3)
MCHC RBC AUTO-ENTMCNC: 33.4 G/DL (ref 31.4–37.4)
MCV RBC AUTO: 89 FL (ref 82–98)
MONOCYTES # BLD AUTO: 0.22 THOUSAND/UL (ref 0–1.22)
MONOCYTES NFR BLD AUTO: 3 % (ref 4–12)
NEUTS BAND NFR BLD MANUAL: 3 % (ref 0–8)
NEUTS SEG # BLD: 1.87 THOUSAND/UL (ref 1.81–6.82)
NEUTS SEG NFR BLD AUTO: 23 %
NRBC BLD AUTO-RTO: 0 /100 WBCS
PLATELET # BLD AUTO: 248 THOUSANDS/UL (ref 149–390)
PLATELET BLD QL SMEAR: ADEQUATE
PMV BLD AUTO: 11 FL (ref 8.9–12.7)
PSA SERPL-MCNC: <0.008 NG/ML (ref 0–4)
RBC # BLD AUTO: 5 MILLION/UL (ref 3.88–5.62)
RBC MORPH BLD: NORMAL
TOTAL CELLS COUNTED SPEC: 100
VARIANT LYMPHS # BLD AUTO: 1 % (ref 0–0)
WBC # BLD AUTO: 7.2 THOUSAND/UL (ref 4.31–10.16)

## 2025-05-27 PROCEDURE — 90715 TDAP VACCINE 7 YRS/> IM: CPT

## 2025-05-27 PROCEDURE — 99214 OFFICE O/P EST MOD 30 MIN: CPT | Performed by: FAMILY MEDICINE

## 2025-05-27 PROCEDURE — 84153 ASSAY OF PSA TOTAL: CPT

## 2025-05-27 PROCEDURE — 99396 PREV VISIT EST AGE 40-64: CPT | Performed by: FAMILY MEDICINE

## 2025-05-27 PROCEDURE — 86140 C-REACTIVE PROTEIN: CPT

## 2025-05-27 PROCEDURE — 85007 BL SMEAR W/DIFF WBC COUNT: CPT

## 2025-05-27 PROCEDURE — 36415 COLL VENOUS BLD VENIPUNCTURE: CPT

## 2025-05-27 PROCEDURE — 90471 IMMUNIZATION ADMIN: CPT

## 2025-05-27 PROCEDURE — 85652 RBC SED RATE AUTOMATED: CPT

## 2025-05-27 RX ORDER — ATORVASTATIN CALCIUM 80 MG/1
80 TABLET, FILM COATED ORAL DAILY
Qty: 90 TABLET | Refills: 1 | Status: SHIPPED | OUTPATIENT
Start: 2025-05-27

## 2025-05-27 RX ORDER — OMEPRAZOLE 20 MG/1
20 CAPSULE, DELAYED RELEASE ORAL DAILY
Qty: 90 CAPSULE | Refills: 1 | Status: SHIPPED | OUTPATIENT
Start: 2025-05-27

## 2025-05-27 RX ORDER — METOPROLOL SUCCINATE 25 MG/1
25 TABLET, EXTENDED RELEASE ORAL DAILY
Qty: 90 TABLET | Refills: 1 | Status: SHIPPED | OUTPATIENT
Start: 2025-05-27

## 2025-05-27 RX ORDER — DULOXETIN HYDROCHLORIDE 60 MG/1
60 CAPSULE, DELAYED RELEASE ORAL DAILY
Qty: 90 CAPSULE | Refills: 1 | Status: SHIPPED | OUTPATIENT
Start: 2025-05-27

## 2025-05-27 RX ORDER — AMLODIPINE BESYLATE 5 MG/1
5 TABLET ORAL DAILY
Qty: 90 TABLET | Refills: 1 | Status: SHIPPED | OUTPATIENT
Start: 2025-05-27

## 2025-05-27 RX ORDER — EZETIMIBE 10 MG/1
10 TABLET ORAL DAILY
Qty: 90 TABLET | Refills: 1 | Status: SHIPPED | OUTPATIENT
Start: 2025-05-27

## 2025-05-27 NOTE — ASSESSMENT & PLAN NOTE
Echocardiogram in March 2024 showed mild to moderate aortic regurgitation with mild mitral regurgitation.  We will reimage in 1 to 2 years per cardiology

## 2025-05-27 NOTE — ASSESSMENT & PLAN NOTE
Echo 3/2024: EF 56%, mild LV wall thickness, mod AR, mild TR, mild MR  Now est with [facility] cardiology  Continue asa, atorvastatin, metoprolol, zetia, amlodipine

## 2025-05-27 NOTE — ASSESSMENT & PLAN NOTE
Chronic  Current Blood Pressure: 139/79   Continue Metoprolol, amlodipine 5mg daily      Orders:    amLODIPine (NORVASC) 5 mg tablet; Take 1 tablet (5 mg total) by mouth daily    metoprolol succinate (TOPROL-XL) 25 mg 24 hr tablet; Take 1 tablet (25 mg total) by mouth daily

## 2025-05-27 NOTE — ASSESSMENT & PLAN NOTE
Chronic, well controlled on Prilosec, continue omeprazole 20 mg daily  Continue to monitor

## 2025-05-27 NOTE — ASSESSMENT & PLAN NOTE
Quit smoking December 2023  Prior heavy smoker, 3 packs a day 40 years  Continue to monitor  Continue annual CT lung screenings in April -previously ordered

## 2025-05-27 NOTE — TELEPHONE ENCOUNTER
Please process prior auth for:      semaglutide, 0.25 or 0.5 mg/dose, (Ozempic, 0.25 or 0.5 MG/DOSE,) 2 mg/3 mL injection pen [[id number]]    Order Details  Dose, Route, Frequency: As Directed   Dispense Quantity: 9 mL (56 day supply) Refills: 0    Duration: 56 days Dispense As Written: No          Sig: Inject 0.375 mL (0.25 mg total) under the skin every 7 days for 28 days, THEN 0.75 mL (0.5 mg total) every 7 days for 28 days. 0.25 mg under the skin every 7 days for 4 doses (28 days), THEN 0.5 mg under the skin every 7 days.         Start Date: 05/27/25 End Date: 07/22/25 after 8 doses   Written Date: 05/27/25 Expiration Date: 05/27/26       Associated Diagnoses: Type 2 diabetes mellitus without complication, without long-term current use of insulin (HCC) [E11.9]       Thank you!

## 2025-05-27 NOTE — ASSESSMENT & PLAN NOTE
Lab Results   Component Value Date    HGBA1C 6.5 (H) 05/08/2025     Chronic, stable  Patient stopped taking metformin due to diarrhea side effect  He also stopped ozempic - we need to restart and titrate up monthly to 2mg weekly maintenance   On statin  DM eye UTD  DM foot exam UTD  Continue to monitor     Orders:    semaglutide, 0.25 or 0.5 mg/dose, (Ozempic, 0.25 or 0.5 MG/DOSE,) 2 mg/3 mL injection pen; Inject 0.375 mL (0.25 mg total) under the skin every 7 days for 28 days, THEN 0.75 mL (0.5 mg total) every 7 days for 28 days. 0.25 mg under the skin every 7 days for 4 doses (28 days), THEN 0.5 mg under the skin every 7 days.    semaglutide, 1 mg/dose, (Ozempic) 4 mg/3 mL injection pen; Inject 0.75 mL (1 mg total) under the skin every 7 days for 28 days Do not start before July 21, 2025.    semaglutide, 2 mg/dose, (Ozempic) 8 mg/ mL injection pen; Inject 0.75 mL (2 mg total) under the skin every 7 days Do not start before August 18, 2025.

## 2025-05-27 NOTE — ASSESSMENT & PLAN NOTE
Stable, continue Cymbalta 60mg daily    Orders:    DULoxetine (CYMBALTA) 60 mg delayed release capsule; Take 1 capsule (60 mg total) by mouth daily

## 2025-05-27 NOTE — ASSESSMENT & PLAN NOTE
Lab Results   Component Value Date    CHOLESTEROL 122 05/08/2025    TRIG 142 05/08/2025    HDL 32 (L) 05/08/2025    LDLCALC 62 05/08/2025     Will continue on atorvastatin 80 mg daily, Zetia 10 mg daily.      Orders:    atorvastatin (LIPITOR) 80 mg tablet; Take 1 tablet (80 mg total) by mouth daily    ezetimibe (ZETIA) 10 mg tablet; Take 1 tablet (10 mg total) by mouth daily

## 2025-05-27 NOTE — TELEPHONE ENCOUNTER
PA for     semaglutide, 0.25 or 0.5 mg/dose, (Ozempic, 0.25 or 0.5 MG/DOSE,) 2 mg/3 mL injection pen   SUBMITTED to [facility] [place]    via    [x][facility]-KEY:  [id number]  []Surescripts-Case ID #    []Availity-Auth ID #  NDC #    []Faxed to plan   []Other website    []Phone call Case ID #      [x]PA sent as URGENT    All office notes, labs and other pertaining documents and studies sent. Clinical questions answered. Awaiting determination from insurance company.     Turnaround time for your insurance to make a decision on your Prior Authorization can take 7-21 business days.

## 2025-05-27 NOTE — PROGRESS NOTES
Adult Annual Physical  Name: Christopher Smalls Jr.      : 1970      MRN: [id number]  Encounter Provider: Edwina Kaplan DO  Encounter Date: 2025   Encounter department: [facility]    :  Assessment & Plan  Annual physical exam         Migraine with aura and without status migrainosus, not intractable  Chronic, follows with neurology  Continue Amitriptyline 50mg daily + prn sumatriptan             Essential hypertension  Chronic  Current Blood Pressure: 139/79   Continue Metoprolol, amlodipine 5mg daily      Orders:    amLODIPine (NORVASC) 5 mg tablet; Take 1 tablet (5 mg total) by mouth daily    metoprolol succinate (TOPROL-XL) 25 mg 24 hr tablet; Take 1 tablet (25 mg total) by mouth daily      Coronary artery disease involving native coronary artery of native heart without angina pectoris  Echo 3/2024: EF 56%, mild LV wall thickness, mod AR, mild TR, mild MR  Now est with [facility]  Continue asa, atorvastatin, metoprolol, zetia, amlodipine             Nonrheumatic aortic valve insufficiency  Echocardiogram in 2024 showed mild to moderate aortic regurgitation with mild mitral regurgitation.  We will reimage in 1 to 2 years per cardiology         Gastroesophageal reflux disease, unspecified whether esophagitis present  Chronic, well controlled on Prilosec, continue omeprazole 20 mg daily  Continue to monitor             Type 2 diabetes mellitus without complication, without long-term current use of insulin ([facility])  Lab Results   Component Value Date    HGBA1C 6.5 (H) 2025     Chronic, stable  Patient stopped taking metformin due to diarrhea side effect  He also stopped ozempic - we need to restart and titrate up monthly to 2mg weekly maintenance   On statin  DM eye UTD  DM foot exam UTD  Continue to monitor     Orders:    semaglutide, 0.25 or 0.5 mg/dose, (Ozempic, 0.25 or 0.5 MG/DOSE,) 2 mg/3 mL injection pen; Inject 0.375 mL (0.25 mg total)  under the skin every 7 days for 28 days, THEN 0.75 mL (0.5 mg total) every 7 days for 28 days. 0.25 mg under the skin every 7 days for 4 doses (28 days), THEN 0.5 mg under the skin every 7 days.    semaglutide, 1 mg/dose, (Ozempic) 4 mg/3 mL injection pen; Inject 0.75 mL (1 mg total) under the skin every 7 days for 28 days Do not start before July 21, 2025.    semaglutide, 2 mg/dose, (Ozempic) 8 mg/ mL injection pen; Inject 0.75 mL (2 mg total) under the skin every 7 days Do not start before August 18, 2025.        Prostate CA (HCC)  S/p resection  Cont mgmt per urology    Orders:    PSA Total, Diagnostic; Future        History of tobacco use disorder  Quit smoking December 2023  Prior heavy smoker, 3 packs a day 40 years  Continue to monitor  Continue annual CT lung screenings in April -previously ordered             Recurrent major depressive disorder, in full remission (HCC)  Stable, continue Cymbalta 60mg daily             Mixed hyperlipidemia  Lab Results   Component Value Date    CHOLESTEROL 122 05/08/2025    TRIG 142 05/08/2025    HDL 32 (L) 05/08/2025    LDLCALC 62 05/08/2025     Will continue on atorvastatin 80 mg daily, Zetia 10 mg daily.      Orders:    atorvastatin (LIPITOR) 80 mg tablet; Take 1 tablet (80 mg total) by mouth daily    ezetimibe (ZETIA) 10 mg tablet; Take 1 tablet (10 mg total) by mouth daily        Encounter for immunization    Orders:    TDAP VACCINE GREATER THAN OR EQUAL TO 6YO IM    Coronary artery disease of native artery of native heart with stable angina pectoris (HCC)    Orders:    atorvastatin (LIPITOR) 80 mg tablet; Take 1 tablet (80 mg total) by mouth daily    metoprolol succinate (TOPROL-XL) 25 mg 24 hr tablet; Take 1 tablet (25 mg total) by mouth daily    Depression  Stable, continue Cymbalta 60mg daily    Orders:    DULoxetine (CYMBALTA) 60 mg delayed release capsule; Take 1 capsule (60 mg total) by mouth daily        GERD (gastroesophageal reflux disease)    Orders:     omeprazole (PriLOSEC) 20 mg delayed release capsule; Take 1 capsule (20 mg total) by mouth daily        Preventive Screenings:  - Diabetes Screening: screening not indicated and has diabetes  - Cholesterol Screening: screening not indicated and has hyperlipidemia   - Hepatitis C screening: screening up-to-date   - HIV screening: screening up-to-date   - Colon cancer screening: risks/benefits discussed and orders placed   - Lung cancer screening: risks/benefits discussed and orders placed   - Prostate cancer screening: has history of prostate cancer     Immunizations:  - Immunizations due: Tdap  - Risks/benefits immunizations discussed    - Immunizations given per orders      Counseling/Anticipatory Guidance:  - Alcohol: discussed moderation in alcohol intake and recommendations for healthy alcohol use.   - Tobacco use: discussed harms of tobacco use and management options for quitting.   - Dental health: discussed importance of regular tooth brushing, flossing, and dental visits.   - Diet: discussed recommendations for a healthy/well-balanced diet.   - Exercise: the importance of regular exercise/physical activity was discussed. Recommend exercise 3-5 times per week for at least 30 minutes.          Return in about 4 months (around 9/27/2025) for Follow up chronic conditions .      History of Present Illness     Chief Complaint   Patient presents with    Physical Exam     Annual physical, recheck chronic health conditions       Adult Annual Physical:  Patient presents for annual physical.     Diet and Physical Activity:  - Diet/Nutrition: diabetic diet and well balanced diet.  - Exercise: walking, 3-4 times a week on average and less than 30 minutes on average.    General Health:  - Sleep: 7-8 hours of sleep on average.  - Hearing: normal hearing bilateral ears.  - Vision: most recent eye exam < 1 year ago.  - Dental: no dental visits for > 1 year and brushes teeth once daily.     Health:  - History of STDs: no.  "  - Urinary symptoms: none.     Review of Systems   Constitutional:  Negative for appetite change, chills and fever.   Eyes:  Negative for visual disturbance.   Respiratory:  Negative for shortness of breath.    Cardiovascular:  Negative for chest pain and leg swelling.   Neurological:  Negative for dizziness, light-headedness and headaches.     Medications Ordered Prior to Encounter[1]   Social History[2]    Objective   /79 (BP Location: Right arm, Patient Position: Sitting, Cuff Size: Standard)   Pulse 85   Ht 5' 7\" (1.702 m)   Wt 97.9 kg (215 lb 13.3 oz)   SpO2 98%   BMI 33.80 kg/m²     Physical Exam  Vitals reviewed.   Constitutional:       General: He is not in acute distress.     Appearance: Normal appearance. He is obese. He is not ill-appearing.   HENT:      Head: Normocephalic and atraumatic.      Right Ear: Tympanic membrane, ear canal and external ear normal.      Left Ear: Tympanic membrane, ear canal and external ear normal.      Nose: Nose normal. No congestion.      Mouth/Throat:      Mouth: Mucous membranes are moist.      Pharynx: Oropharynx is clear.     Eyes:      Extraocular Movements: Extraocular movements intact.      Conjunctiva/sclera: Conjunctivae normal.       Cardiovascular:      Rate and Rhythm: Normal rate and regular rhythm.      Pulses: Normal pulses.      Heart sounds: Normal heart sounds.   Pulmonary:      Effort: Pulmonary effort is normal.      Breath sounds: Normal breath sounds.   Abdominal:      General: Abdomen is flat. Bowel sounds are normal.      Palpations: Abdomen is soft.     Musculoskeletal:      Cervical back: Normal range of motion and neck supple.      Right lower leg: No edema.      Left lower leg: No edema.     Skin:     General: Skin is warm and dry.     Neurological:      General: No focal deficit present.      Mental Status: He is alert.     Psychiatric:         Mood and Affect: Mood normal.         Behavior: Behavior normal.         Thought Content: " Thought content normal.              [1]   Current Outpatient Medications on File Prior to Visit   Medication Sig Dispense Refill    amitriptyline (ELAVIL) 50 mg tablet TAKE 1 TABLET BY MOUTH ONCE DAILY AT BEDTIME 90 tablet 1    HYDROcodone-acetaminophen (NORCO) 5-325 mg per tablet Take 1 tablet by mouth every 8 (eight) hours as needed for pain (do not exceed 3 tablets in 24 hours) Max Daily Amount: 3 tablets 90 tablet 0    SUMAtriptan (IMITREX) 100 mg tablet Take 1 tablet (100 mg total) by mouth as needed for migraine May repeat in 2 hours. Limit of 3/week or 9/month 16 tablet 0    [DISCONTINUED] amLODIPine (NORVASC) 5 mg tablet Take 1 tablet by mouth once daily 90 tablet 1    [DISCONTINUED] atorvastatin (LIPITOR) 80 mg tablet Take 1 tablet by mouth once daily 90 tablet 0    [DISCONTINUED] DULoxetine (CYMBALTA) 60 mg delayed release capsule Take 1 capsule by mouth once daily 90 capsule 1    [DISCONTINUED] ezetimibe (ZETIA) 10 mg tablet Take 1 tablet by mouth once daily 90 tablet 1    [DISCONTINUED] metFORMIN (GLUCOPHAGE-XR) 500 mg 24 hr tablet Take 1 tablet by mouth once daily with breakfast 90 tablet 1    [DISCONTINUED] metoprolol succinate (TOPROL-XL) 25 mg 24 hr tablet Take 1 tablet by mouth once daily 90 tablet 1    [DISCONTINUED] omeprazole (PriLOSEC) 20 mg delayed release capsule Take 1 capsule by mouth once daily 90 capsule 1    [DISCONTINUED] semaglutide, 1 mg/dose, (Ozempic) 4 mg/3 mL injection pen Inject 0.75 mL (1 mg total) under the skin once a week 9 mL 0    acetaminophen (TYLENOL) 325 mg tablet Take 1 tablet (325 mg total) by mouth every 6 (six) hours as needed for mild pain (Patient not taking: Reported on 1/27/2025) 30 tablet 0    naproxen (NAPROSYN) 500 mg tablet Take 1 tablet (500 mg total) by mouth 2 (two) times a day as needed for mild pain or moderate pain (Patient not taking: Reported on 5/27/2025) 30 tablet 0     No current facility-administered medications on file prior to visit.   [2]    Social History  Tobacco Use    Smoking status: Every Day     Current packs/day: 0.25     Average packs/day: 2.9 packs/day for 50.1 years (147.8 ttl pk-yrs)     Types: Cigarettes     Start date: 4/15/2024     Last attempt to quit: 12/20/2023     Passive exposure: Past    Smokeless tobacco: Never   Vaping Use    Vaping status: Every Day    Start date: 9/20/2024    Substances: Nicotine   Substance and Sexual Activity    Alcohol use: Not Currently     Comment: occasionally    Drug use: Not Currently     Types: Marijuana, Cocaine    Sexual activity: Not Currently     Partners: Female     Birth control/protection: None

## 2025-06-03 ENCOUNTER — RESULTS FOLLOW-UP (OUTPATIENT)
Dept: FAMILY MEDICINE CLINIC | Facility: CLINIC | Age: 55
End: 2025-06-03

## 2025-06-03 DIAGNOSIS — D72.820 LYMPHOCYTOSIS: Primary | ICD-10-CM

## 2025-06-03 DIAGNOSIS — D72.820 ATYPICAL LYMPHOCYTOSIS: ICD-10-CM

## 2025-06-06 ENCOUNTER — OFFICE VISIT (OUTPATIENT)
Dept: PAIN MEDICINE | Facility: CLINIC | Age: 55
End: 2025-06-06
Payer: COMMERCIAL

## 2025-06-06 VITALS — HEIGHT: 67 IN | BODY MASS INDEX: 34 KG/M2 | WEIGHT: 216.6 LBS

## 2025-06-06 DIAGNOSIS — M54.50 ACUTE EXACERBATION OF CHRONIC LOW BACK PAIN: ICD-10-CM

## 2025-06-06 DIAGNOSIS — G89.29 ACUTE EXACERBATION OF CHRONIC LOW BACK PAIN: ICD-10-CM

## 2025-06-06 PROCEDURE — 99213 OFFICE O/P EST LOW 20 MIN: CPT | Performed by: ANESTHESIOLOGY

## 2025-06-06 RX ORDER — NAPROXEN 500 MG/1
500 TABLET ORAL 2 TIMES DAILY PRN
Qty: 60 TABLET | Refills: 2 | Status: SHIPPED | OUTPATIENT
Start: 2025-06-06

## 2025-06-06 RX ORDER — NAPROXEN 500 MG/1
500 TABLET ORAL 2 TIMES DAILY PRN
Qty: 30 TABLET | Refills: 0 | Status: SHIPPED | OUTPATIENT
Start: 2025-06-06 | End: 2025-06-06

## 2025-06-06 NOTE — PATIENT INSTRUCTIONS
"Patient Education     Back exercises   The Basics   Written by the doctors and editors at [facility]   Why do I need to do back exercises? -- Back exercises can help ease back pain and might help prevent future back pain. Long term, it is important to strengthen the muscles in your lower back, buttocks, and belly. These are your \"core muscles.\" Stretching exercises are also important to keep your muscles flexible.  Below are some stretching and strengthening exercises that might help you. Other forms of movement can help ease or prevent back pain, too. For example, some people like to walk, do aerobic exercise, or do yoga or jacklyn chi. The most important thing is to move your body. Your doctor, nurse, or physical therapist can help you find different types of activity that work for you.  What stretching exercises should I do? -- Below are some examples of stretching exercises. Warm up your muscles before stretching to help prevent injury. To warm up, you can walk, jog, or cycle for a few minutes.  Start by repeating each of these stretches 2 to 3 times. Try to hold each stretch for 5 to 10 seconds, and try to do the stretches 2 to 3 times each day. Breathe slowly and deeply as you do the exercises. Never bounce when doing stretches.   Cat-cow stretch (figure 1) - Start on all fours on the floor, with your hands under your shoulders, knees under your hips, and your back flat. First, tuck your chin and tighten your stomach muscles as you round your back (like a \"cat\"). Hold the stretch for about 10 seconds. Rest for a few seconds as you flatten your back. Next, lift your chin and let your belly and lower back sink toward the floor (like a \"cow\"). Hold the stretch for about 10 seconds.   Single knee-to-chest stretches (figure 2) ? While lying on your back, bend your knees with your feet flat on the floor. Pull 1 knee toward your chest until you feel a stretch in your lower back and buttock area. Lower, and repeat with the " other knee. If you have knee problems, pull your knee up by grabbing the back of your thigh instead of the front of your knee. You can also do this exercise by grabbing both knees at the same time.   Lower trunk rotations (figure 3) ? While lying on your back, bend your knees with your feet flat on the floor. Keep your knees and ankles together, and then drop them to 1 side. Keep both of your shoulders touching the floor until you feel a stretch in the muscles at the side of the back. Repeat on the other side.   Lower back stretches seated (figure 4) ? Sit in a chair with your feet spread about shoulder width apart. Then, lean forward until you feel a stretch in your lower back.  What strengthening exercises should I do? -- Below are some examples of strengthening exercises.  Start by doing each exercise 2 to 3 times. Work up to doing each exercise 10 times. Hold each exercise for 3 to 5 seconds, and try to do the exercises 2 to 3 times each day. Do all exercises slowly.   Shoulder blade squeezes (figure 5) ? Pinch your shoulder blades together on your upper back, and hold 3 to 5 seconds. You can also do these 1 side at a time. Sit with good posture, and make sure that your shoulders do not rise up when you do this exercise. Relax.   Pelvic tilts (figure 6) ? Lie on your back with your knees bent and feet flat on the floor. Tighten your stomach muscles, and press your lower back down to the floor. Relax. You should be able to breathe comfortably during this exercise.   Hip lifts (figure 7) ? Lie on your back with your knees bent and feet flat on the floor. Tighten your stomach muscles, keep your back flat, and lift your buttocks off of the floor. Relax. You should feel this in your buttocks, not in your lower back.  What else should I know?    Exercise, including stretching, might be slightly uncomfortable. But you should not have sharp or severe pain. If you do get severe pain, stop what you are doing. If severe  "pain continues, call your doctor or nurse.   Do not hold your breath when exercising. If you tend to hold your breath, try counting out loud when exercising. If any exercise bothers you, stop right away.   Always warm up before exercising. Warm muscles stretch much easier than cool muscles. Stretching cool muscles can lead to injury.   Doing exercises before a meal can be a good way to get into a routine.  All topics are updated as new evidence becomes available and our peer review process is complete.  This topic retrieved from [employer] on: Apr 03, 2024.  Topic 623229 Version 2.0  Release: 32.2.4 - C32.92  © 2024 [employer]Date, Inc. and/or its affiliates. All rights reserved.  figure 1: Cat-cow stretch     Start on all fours on the floor, with hands under your shoulders, knees under your hips, and your back flat. First, tuck your chin and tighten your stomach muscles as you round your back (like a \"cat\"). Hold the stretch for about 10 seconds. Rest for a few seconds as you flatten your back. Next, lift your chin and let your belly and lower back sink toward the floor (like a \"cow\"). Hold the stretch for about 10 seconds.  Graphic 630802 Version 1.0  figure 2: Single knee-to-chest stretch     Lie on your back, bend your knees, and have your feet flat on the floor. Pull 1 knee toward your chest until you feel a stretch in your lower back and buttock area. Repeat with the other knee. If you have knee problems, pull your knee up by grabbing the back of your thigh instead of the front of your knee. You can also do this exercise by grabbing both knees at the same time.  Graphic [id number] Version 1.0  figure 3: Lower trunk rotation     While lying on your back, bend your knees and have your feet flat on the floor. Keep your legs together and then drop them to 1 side. Keep both of your shoulders touching the floor until you feel a stretch in the muscles at the side of the back. Repeat on the other side.  Graphic [id number] Version " 1.0  figure 4: Lower back stretch     Sit in a chair with your feet spread about shoulder width apart. Then, lean forward until you feel a stretch in your lower back.  Graphic [id number] Version 1.0  figure 5: Shoulder blade squeezes     Pinch your shoulder blades together on your upper back and hold for 3 to 5 seconds. Make sure that you are sitting with good posture and that your shoulders do not raise up when you do this exercise. Relax.  Graphic [id number] Version 1.0  figure 6: Pelvic tilts     Lie on your back with your knees bent and feet flat on the floor. Tighten your stomach muscles and press your lower back down to the floor. Relax.  Graphic [id number] Version 1.0  figure 7: Hip lifts     Lie on your back with your knees bent and feet flat on the floor. Tighten your stomach muscles and lift your buttocks off of the floor. Relax.  Graphic [id number] Version 1.0  Consumer Information Use and Disclaimer   Disclaimer: This generalized information is a limited summary of diagnosis, treatment, and/or medication information. It is not meant to be comprehensive and should be used as a tool to help the user understand and/or assess potential diagnostic and treatment options. It does NOT include all information about conditions, treatments, medications, side effects, or risks that may apply to a specific patient. It is not intended to be medical advice or a substitute for the medical advice, diagnosis, or treatment of a health care provider based on the health care provider's examination and assessment of a patient's specific and unique circumstances. Patients must speak with a health care provider for complete information about their health, medical questions, and treatment options, including any risks or benefits regarding use of medications. This information does not endorse any treatments or medications as safe, effective, or approved for treating a specific patient. Up[employer], Inc[employer] and its affiliates disclaim any warranty or  liability relating to this information or the use thereof.The use of this information is governed by the Terms of Use, available at https://www.wolterskluwer.com/en/know/clinical-effectiveness-terms. 2024© Up[employer]Date, Inc. and its affiliates and/or licensors. All rights reserved.  Copyright   © 2024 [employer] and/or its affiliates. All rights reserved.

## 2025-06-06 NOTE — PROGRESS NOTES
Name: Christopher Smalls Jr.      : 1970      MRN: [id number]  Encounter Provider: Kunal Maurer MD  Encounter Date: 2025   Encounter department: [facility] [place]  :  Assessment & Plan  Acute exacerbation of chronic low back pain    Orders:    naproxen (NAPROSYN) 500 mg tablet; Take 1 tablet (500 mg total) by mouth 2 (two) times a day as needed for moderate pain    Greater than 70% relief of pain with improved ability to participate with IADLs after right L3 and L4 thus far. Patient has completed formal PT with modest benefit and notes at least 30% relief of pain and improved function taking naproxen and rare opioid therapy without side effects. Previously reported the following symptomatology:     -f/u prn; counseled regarding red flag sxs  -naproxen 500 mg b.i.d. prn pain prescribed.  Patient educated regarding bleeding risk of taking this medication as well as not taking any other nonsteroidal anti-inflammatory medications while taking this medication; counseled thoroughly regarding potential risk of Cardiovascular injury, Kidney injury,   -norco 5-325mg m5kmood prn pain rx; patient is using sparingly; risks reviewed below; pain contract reviewed and signed by both parties, Uds obtained, will follow result;   -lyrica 75 mg t.i.d. Ordered for patient; has since tapered given decreased efficacy; counseled regarding sedative effects of taking this medication and provided up titration calendar.  Counseled not to take medication while driving or operating heavy machinery/using stairs  -has been participant with formal physical therapy for right-sided lumbar radiculopathy, hip pain; Physician directed home exercise plan as per [employer] demonstrated and handouts provided that patient plans to participate with for 1 hour, twice a week for the next 6 weeks.     Complete risks and benefits including bleeding, infection, tissue reaction, nerve injury and allergic reaction were  discussed. The approach was demonstrated using models and literature was provided. Verbal and written consent was obtained.    My impressions and treatment recommendations were discussed in detail with the patient who verbalized understanding and had no further questions.  Discharge instructions were provided. I personally saw and examined the patient and I agree with the above discussed plan of care.    History of Present Illness     Christopher Smalls Jr. is a 55 y.o. male who presents for a follow up office visit in regards to Follow-up. The patient’s current symptoms include subacute lumbar radicular pain in the right L3 and L4 dermatomal distributions. Debilitating pain limited weakness numbness and paresthesias accompany the pain. The patient rates the pain at a 8/10 accompanied by electric shock-like shooting features and crampy burning pain in the aforementioned dermatomal distributions.  The pain is worse in the mornings as well as the end of the day; exertion such as walking for long periods of time seems to exacerbate the pain. He tries to maintain an active lifestyle and finds that the current degree of pain seems to compromises his efforts.  The pain significantly impacts independent activities of daily living and contributes to significant disability.  He has attempted PT this year with modest relief.  He has taken naproxen, tylenol as well as gabapentin with limited relief of the pain as well.  Prior right hip injection and ILESI unsuccessful in helping to control pain. He denies any bowel or bladder dysfunction/incontinence, saddle anesthesia or gait instability.     Review of Systems   Constitutional:  Positive for activity change.   HENT: Negative.     Eyes: Negative.    Respiratory: Negative.     Cardiovascular: Negative.    Gastrointestinal: Negative.    Endocrine: Negative.    Genitourinary: Negative.    Musculoskeletal:  Positive for arthralgias, back pain, gait problem and myalgias.   Skin:  "Negative.    Allergic/Immunologic: Negative.    Neurological:  Positive for weakness and numbness.   Hematological: Negative.    Psychiatric/Behavioral: Negative.     All other systems reviewed and are negative.      Medical History Reviewed by provider this encounter:     .    Objective   Ht 5' 7\" (1.702 m)   Wt 98.2 kg (216 lb 9.6 oz)   BMI 33.92 kg/m²         Physical Exam  Constitutional: normal, well developed, well nourished, alert, in no distress and non-toxic and no overt pain behavior. and obese  Eyes: anicteric  HEENT: grossly intact  Neck: supple, symmetric, trachea midline and no masses   Pulmonary:even and unlabored  Cardiovascular:No edema or pitting edema present  Skin:Normal without rashes or lesions and well hydrated  Psychiatric:Mood and affect appropriate  Neurologic:Cranial Nerves II-XII grossly intact Sensation grossly intact; no clonus negative amezcua's. Reflexes 2+ and brisk. SLR negative bilaterally.   Musculoskeletal:normal gait. 5/5 strength bilaterally with AROM in lower extremities. Dififculty with normal heel toe and tip toe walking. Significant pain with lumbar facet loading bilaterally and with lateral spine rotation, ttp over lumbar paraspinal muscles, right greater than left. Negative emili's test, negative gaenslen's negative SIJ loading bilaterally. Ttp over right gtb, pain with internal and external rotation of right hip    Radiology Results Review: I personally reviewed the following image studies in PACS and associated radiology reports: MRI spine. My interpretation of the radiology images/reports is: at L3-L4: Disc bulge with a superimposed right foraminal disc herniation. There is narrowing of the subarticular recess on both sides. Marked facet arthropathy.There is moderate-marked compressive right neuroforaminal narrowing with mild-moderate left neuroforaminal narrowing. Mild spinal canal stenosis. At L4-L5: Disc bulge with superimposed central disc herniation that is " inferiorly directed. Narrowing of the subarticular recess on both sides with associated mass effect. Moderate bilateral neuroforaminal narrowing. Moderate spinal canal stenosis.    Administrative Statements   I have spent a total time of 30 minutes in caring for this patient on the day of the visit/encounter including Diagnostic results, Prognosis, Risks and benefits of tx options, Instructions for management, Patient and family education, Importance of tx compliance, Risk factor reductions, Impressions, Counseling / Coordination of care, Documenting in the medical record, Reviewing/placing orders in the medical record (including tests, medications, and/or procedures), Obtaining or reviewing history  , and Communicating with other healthcare professionals .

## 2025-06-17 ENCOUNTER — TELEPHONE (OUTPATIENT)
Dept: FAMILY MEDICINE CLINIC | Facility: CLINIC | Age: 55
End: 2025-06-17

## 2025-06-19 ENCOUNTER — APPOINTMENT (OUTPATIENT)
Dept: LAB | Facility: CLINIC | Age: 55
End: 2025-06-19
Payer: COMMERCIAL

## 2025-06-19 DIAGNOSIS — D72.820 LYMPHOCYTOSIS: ICD-10-CM

## 2025-06-19 DIAGNOSIS — D72.820 ATYPICAL LYMPHOCYTOSIS: ICD-10-CM

## 2025-06-19 PROCEDURE — 36415 COLL VENOUS BLD VENIPUNCTURE: CPT

## 2025-06-19 PROCEDURE — 88185 FLOWCYTOMETRY/TC ADD-ON: CPT

## 2025-06-19 PROCEDURE — 88184 FLOWCYTOMETRY/ TC 1 MARKER: CPT | Performed by: FAMILY MEDICINE

## 2025-06-24 LAB — SCAN RESULT: NORMAL

## (undated) DEVICE — SYRINGE 3ML LL

## (undated) DEVICE — POSITIONER TRIMANO LIMB BEACH CHAIR

## (undated) DEVICE — TUBING SUCTION 5MM X 12 FT

## (undated) DEVICE — CANNULA 7 X70MM THRD SEAL SIDE PORT

## (undated) DEVICE — NEEDLE SUT SCORPION MEGALOADER

## (undated) DEVICE — CHLORAPREP HI-LITE 26ML ORANGE

## (undated) DEVICE — TUBING ARTHROSCOPIC WAVE  MAIN PUMP

## (undated) DEVICE — ARTHROSCOPY FLOOR MAT

## (undated) DEVICE — Device

## (undated) DEVICE — T-MAX DISPOSABLE FACE MASK 8 PER BOX

## (undated) DEVICE — OCCLUSIVE GAUZE STRIP,3% BISMUTH TRIBROMOPHENATE IN PETROLATUM BLEND: Brand: XEROFORM

## (undated) DEVICE — GLOVE INDICATOR PI UNDERGLOVE SZ 8 BLUE

## (undated) DEVICE — PROBE ABLATION  APOLLORF 90 DEG MULTI PORT

## (undated) DEVICE — PACK PBDS SHOULDER ARTHROSCOPY RF

## (undated) DEVICE — KERLIX BANDAGE ROLL: Brand: KERLIX

## (undated) DEVICE — ABDOMINAL PAD: Brand: DERMACEA

## (undated) DEVICE — INTENDED FOR TISSUE SEPARATION, AND OTHER PROCEDURES THAT REQUIRE A SHARP SURGICAL BLADE TO PUNCTURE OR CUT.: Brand: BARD-PARKER ® CARBON RIB-BACK BLADES

## (undated) DEVICE — 3M™ IOBAN™ 2 ANTIMICROBIAL INCISE DRAPE 6650EZ: Brand: IOBAN™ 2

## (undated) DEVICE — GAUZE SPONGES,16 PLY: Brand: CURITY

## (undated) DEVICE — SUT ETHILON 3-0 PS-1 18 IN 1663G

## (undated) DEVICE — IMMOBILIZER SHOULDER QUICK FIT SLING W/PILLOW

## (undated) DEVICE — 3M™ STERI-DRAPE™ U-DRAPE 1015: Brand: STERI-DRAPE™

## (undated) DEVICE — BLADE SHAVER DISSECTOR 5MM 13CM COOLCUT

## (undated) DEVICE — IMPERVIOUS STOCKINETTE: Brand: DEROYAL

## (undated) DEVICE — GLOVE SRG BIOGEL 7.5